# Patient Record
Sex: FEMALE | Race: WHITE | Employment: OTHER | URBAN - METROPOLITAN AREA
[De-identification: names, ages, dates, MRNs, and addresses within clinical notes are randomized per-mention and may not be internally consistent; named-entity substitution may affect disease eponyms.]

---

## 2017-02-15 LAB
AVERAGE GLUCOSE: NORMAL
HBA1C MFR BLD: 9.1 %

## 2017-05-17 ENCOUNTER — OFFICE VISIT (OUTPATIENT)
Dept: FAMILY MEDICINE CLINIC | Age: 62
End: 2017-05-17

## 2017-05-17 VITALS
HEART RATE: 95 BPM | HEIGHT: 68 IN | SYSTOLIC BLOOD PRESSURE: 126 MMHG | OXYGEN SATURATION: 98 % | BODY MASS INDEX: 42.83 KG/M2 | DIASTOLIC BLOOD PRESSURE: 84 MMHG | WEIGHT: 282.6 LBS

## 2017-05-17 DIAGNOSIS — E66.01 MORBID OBESITY DUE TO EXCESS CALORIES (HCC): ICD-10-CM

## 2017-05-17 DIAGNOSIS — F41.9 ANXIETY: ICD-10-CM

## 2017-05-17 DIAGNOSIS — G47.33 OBSTRUCTIVE SLEEP APNEA: Chronic | ICD-10-CM

## 2017-05-17 DIAGNOSIS — I10 ESSENTIAL HYPERTENSION: ICD-10-CM

## 2017-05-17 DIAGNOSIS — F90.0 ATTENTION DEFICIT HYPERACTIVITY DISORDER (ADHD), PREDOMINANTLY INATTENTIVE TYPE: ICD-10-CM

## 2017-05-17 DIAGNOSIS — E11.9 TYPE 2 DIABETES MELLITUS WITHOUT COMPLICATION, WITHOUT LONG-TERM CURRENT USE OF INSULIN (HCC): Primary | ICD-10-CM

## 2017-05-17 DIAGNOSIS — R53.83 FATIGUE, UNSPECIFIED TYPE: ICD-10-CM

## 2017-05-17 DIAGNOSIS — E06.3 HYPOTHYROIDISM DUE TO HASHIMOTO'S THYROIDITIS: ICD-10-CM

## 2017-05-17 DIAGNOSIS — E03.8 HYPOTHYROIDISM DUE TO HASHIMOTO'S THYROIDITIS: ICD-10-CM

## 2017-05-17 PROCEDURE — 3046F HEMOGLOBIN A1C LEVEL >9.0%: CPT | Performed by: FAMILY MEDICINE

## 2017-05-17 PROCEDURE — G8427 DOCREV CUR MEDS BY ELIG CLIN: HCPCS | Performed by: FAMILY MEDICINE

## 2017-05-17 PROCEDURE — 3017F COLORECTAL CA SCREEN DOC REV: CPT | Performed by: FAMILY MEDICINE

## 2017-05-17 PROCEDURE — 3014F SCREEN MAMMO DOC REV: CPT | Performed by: FAMILY MEDICINE

## 2017-05-17 PROCEDURE — 1036F TOBACCO NON-USER: CPT | Performed by: FAMILY MEDICINE

## 2017-05-17 PROCEDURE — G8417 CALC BMI ABV UP PARAM F/U: HCPCS | Performed by: FAMILY MEDICINE

## 2017-05-17 PROCEDURE — 99214 OFFICE O/P EST MOD 30 MIN: CPT | Performed by: FAMILY MEDICINE

## 2017-05-17 RX ORDER — NYSTATIN 100000 U/G
CREAM TOPICAL
Status: ON HOLD | COMMUNITY
Start: 2013-10-15 | End: 2020-07-29 | Stop reason: HOSPADM

## 2017-05-17 RX ORDER — SPIRONOLACTONE 25 MG/1
25 TABLET ORAL DAILY
Qty: 30 TABLET | Refills: 5 | Status: SHIPPED | OUTPATIENT
Start: 2017-05-17 | End: 2017-11-23 | Stop reason: SDUPTHER

## 2017-05-17 RX ORDER — INSULIN ASPART 100 [IU]/ML
INJECTION, SOLUTION INTRAVENOUS; SUBCUTANEOUS
COMMUNITY
Start: 2017-05-16 | End: 2017-06-17 | Stop reason: SDUPTHER

## 2017-05-17 RX ORDER — LEVOTHYROXINE SODIUM 300 MCG
TABLET ORAL
Refills: 3 | COMMUNITY
Start: 2017-05-11 | End: 2017-05-17

## 2017-05-17 RX ORDER — NYSTATIN 100000 U/G
CREAM TOPICAL
Qty: 30 G | Refills: 2 | Status: SHIPPED | OUTPATIENT
Start: 2017-05-17 | End: 2019-11-09

## 2017-05-17 RX ORDER — ZOLPIDEM TARTRATE 10 MG/1
10 TABLET ORAL NIGHTLY PRN
Qty: 30 TABLET | Refills: 0 | Status: SHIPPED | OUTPATIENT
Start: 2017-05-17 | End: 2017-06-19 | Stop reason: SDUPTHER

## 2017-05-17 RX ORDER — DEXTROAMPHETAMINE SACCHARATE, AMPHETAMINE ASPARTATE MONOHYDRATE, DEXTROAMPHETAMINE SULFATE AND AMPHETAMINE SULFATE 2.5; 2.5; 2.5; 2.5 MG/1; MG/1; MG/1; MG/1
10 CAPSULE, EXTENDED RELEASE ORAL EVERY MORNING
Qty: 30 CAPSULE | Refills: 0 | Status: SHIPPED | OUTPATIENT
Start: 2017-05-17 | End: 2019-06-05

## 2017-05-17 RX ORDER — ZOLPIDEM TARTRATE 10 MG/1
TABLET ORAL
COMMUNITY
Start: 2017-01-19 | End: 2019-06-05

## 2017-05-17 RX ORDER — MIRABEGRON 25 MG/1
TABLET, FILM COATED, EXTENDED RELEASE ORAL
COMMUNITY
Start: 2017-03-16 | End: 2019-11-09 | Stop reason: SDUPTHER

## 2017-05-17 RX ORDER — LISINOPRIL 2.5 MG/1
2.5 TABLET ORAL
COMMUNITY
Start: 2016-11-07 | End: 2019-06-12 | Stop reason: SDUPTHER

## 2017-05-17 RX ORDER — LEVOTHYROXINE SODIUM 300 UG/1
300 TABLET ORAL
COMMUNITY
Start: 2016-11-17 | End: 2017-06-28 | Stop reason: SDUPTHER

## 2017-05-17 RX ORDER — SERTRALINE HYDROCHLORIDE 100 MG/1
100 TABLET, FILM COATED ORAL DAILY
COMMUNITY
Start: 2017-05-01 | End: 2017-06-19 | Stop reason: SDUPTHER

## 2017-05-17 RX ORDER — FESOTERODINE FUMARATE 4 MG/1
TABLET, FILM COATED, EXTENDED RELEASE ORAL
COMMUNITY
Start: 2017-05-16 | End: 2019-11-09

## 2017-05-26 ASSESSMENT — ENCOUNTER SYMPTOMS: RESPIRATORY NEGATIVE: 1

## 2017-06-02 DIAGNOSIS — E11.9 TYPE 2 DIABETES MELLITUS WITHOUT COMPLICATION, WITHOUT LONG-TERM CURRENT USE OF INSULIN (HCC): ICD-10-CM

## 2017-06-02 DIAGNOSIS — E03.8 HYPOTHYROIDISM DUE TO HASHIMOTO'S THYROIDITIS: ICD-10-CM

## 2017-06-02 DIAGNOSIS — E06.3 HYPOTHYROIDISM DUE TO HASHIMOTO'S THYROIDITIS: ICD-10-CM

## 2017-06-02 DIAGNOSIS — I10 ESSENTIAL HYPERTENSION: ICD-10-CM

## 2017-06-02 LAB
A/G RATIO: 1.6 (ref 1.1–2.2)
ALBUMIN SERPL-MCNC: 4.3 G/DL (ref 3.4–5)
ALP BLD-CCNC: 49 U/L (ref 40–129)
ALT SERPL-CCNC: 20 U/L (ref 10–40)
ANION GAP SERPL CALCULATED.3IONS-SCNC: 13 MMOL/L (ref 3–16)
AST SERPL-CCNC: 14 U/L (ref 15–37)
BILIRUB SERPL-MCNC: 0.5 MG/DL (ref 0–1)
BUN BLDV-MCNC: 15 MG/DL (ref 7–20)
CALCIUM SERPL-MCNC: 10 MG/DL (ref 8.3–10.6)
CHLORIDE BLD-SCNC: 97 MMOL/L (ref 99–110)
CHOLESTEROL, TOTAL: 108 MG/DL (ref 0–199)
CO2: 26 MMOL/L (ref 21–32)
CREAT SERPL-MCNC: 0.5 MG/DL (ref 0.6–1.1)
CREATININE URINE: 70.6 MG/DL (ref 28–259)
GFR AFRICAN AMERICAN: >60
GFR NON-AFRICAN AMERICAN: >60
GLOBULIN: 2.7 G/DL
GLUCOSE BLD-MCNC: 290 MG/DL (ref 70–99)
HCT VFR BLD CALC: 41.6 % (ref 36–48)
HDLC SERPL-MCNC: 39 MG/DL (ref 40–60)
HEMOGLOBIN: 13.8 G/DL (ref 12–16)
LDL CHOLESTEROL CALCULATED: 39 MG/DL
MCH RBC QN AUTO: 30.5 PG (ref 26–34)
MCHC RBC AUTO-ENTMCNC: 33.2 G/DL (ref 31–36)
MCV RBC AUTO: 91.9 FL (ref 80–100)
MICROALBUMIN UR-MCNC: 2.5 MG/DL
MICROALBUMIN/CREAT UR-RTO: 35.4 MG/G (ref 0–30)
PDW BLD-RTO: 14.5 % (ref 12.4–15.4)
PLATELET # BLD: 169 K/UL (ref 135–450)
PMV BLD AUTO: 9 FL (ref 5–10.5)
POTASSIUM SERPL-SCNC: 5 MMOL/L (ref 3.5–5.1)
RBC # BLD: 4.52 M/UL (ref 4–5.2)
SODIUM BLD-SCNC: 136 MMOL/L (ref 136–145)
TOTAL PROTEIN: 7 G/DL (ref 6.4–8.2)
TRIGL SERPL-MCNC: 150 MG/DL (ref 0–150)
VLDLC SERPL CALC-MCNC: 30 MG/DL
WBC # BLD: 5.5 K/UL (ref 4–11)

## 2017-06-03 LAB
ESTIMATED AVERAGE GLUCOSE: 226 MG/DL
HBA1C MFR BLD: 9.5 %
T3 TOTAL: 1.13 NG/ML (ref 0.8–2)
T4 FREE: 2.3 NG/DL (ref 0.9–1.8)
TSH SERPL DL<=0.05 MIU/L-ACNC: 2.79 UIU/ML (ref 0.27–4.2)

## 2017-06-05 ENCOUNTER — TELEPHONE (OUTPATIENT)
Dept: FAMILY MEDICINE CLINIC | Age: 62
End: 2017-06-05

## 2017-06-15 ENCOUNTER — TELEPHONE (OUTPATIENT)
Dept: FAMILY MEDICINE CLINIC | Age: 62
End: 2017-06-15

## 2017-06-19 ENCOUNTER — TELEPHONE (OUTPATIENT)
Dept: FAMILY MEDICINE CLINIC | Age: 62
End: 2017-06-19

## 2017-06-19 DIAGNOSIS — F41.9 ANXIETY: ICD-10-CM

## 2017-06-19 DIAGNOSIS — G47.30 SLEEP APNEA, UNSPECIFIED TYPE: Primary | ICD-10-CM

## 2017-06-19 DIAGNOSIS — E11.9 TYPE 2 DIABETES MELLITUS WITHOUT COMPLICATION, WITHOUT LONG-TERM CURRENT USE OF INSULIN (HCC): ICD-10-CM

## 2017-06-19 RX ORDER — ZOLPIDEM TARTRATE 10 MG/1
10 TABLET ORAL NIGHTLY PRN
Qty: 30 TABLET | Refills: 0 | Status: SHIPPED | OUTPATIENT
Start: 2017-06-19 | End: 2017-07-10 | Stop reason: SDUPTHER

## 2017-06-19 RX ORDER — INSULIN ASPART 100 [IU]/ML
INJECTION, SOLUTION INTRAVENOUS; SUBCUTANEOUS
Qty: 15 ML | Refills: 0 | Status: SHIPPED | OUTPATIENT
Start: 2017-06-19 | End: 2017-08-16 | Stop reason: SDUPTHER

## 2017-06-19 RX ORDER — ZOLPIDEM TARTRATE 10 MG/1
TABLET ORAL
Qty: 45 TABLET | Refills: 0 | Status: CANCELLED | OUTPATIENT
Start: 2017-06-19

## 2017-06-20 DIAGNOSIS — E11.9 TYPE 2 DIABETES MELLITUS WITHOUT COMPLICATION, WITHOUT LONG-TERM CURRENT USE OF INSULIN (HCC): ICD-10-CM

## 2017-06-20 RX ORDER — BLOOD SUGAR DIAGNOSTIC
STRIP MISCELLANEOUS
Qty: 300 STRIP | Refills: 3 | Status: SHIPPED | OUTPATIENT
Start: 2017-06-20 | End: 2018-02-08 | Stop reason: SDUPTHER

## 2017-06-20 RX ORDER — SERTRALINE HYDROCHLORIDE 100 MG/1
100 TABLET, FILM COATED ORAL 2 TIMES DAILY
Qty: 180 TABLET | Refills: 0 | Status: SHIPPED | OUTPATIENT
Start: 2017-06-20 | End: 2019-07-31 | Stop reason: SDUPTHER

## 2017-06-26 RX ORDER — DEXTROAMPHETAMINE SACCHARATE, AMPHETAMINE ASPARTATE MONOHYDRATE, DEXTROAMPHETAMINE SULFATE AND AMPHETAMINE SULFATE 2.5; 2.5; 2.5; 2.5 MG/1; MG/1; MG/1; MG/1
10 CAPSULE, EXTENDED RELEASE ORAL EVERY MORNING
Qty: 30 CAPSULE | Refills: 0 | OUTPATIENT
Start: 2017-06-26

## 2017-06-29 ENCOUNTER — TELEPHONE (OUTPATIENT)
Dept: FAMILY MEDICINE CLINIC | Age: 62
End: 2017-06-29

## 2017-06-29 RX ORDER — LEVOTHYROXINE SODIUM 300 UG/1
300 TABLET ORAL DAILY
Qty: 90 TABLET | Refills: 0 | Status: SHIPPED | OUTPATIENT
Start: 2017-06-29 | End: 2019-09-25 | Stop reason: ALTCHOICE

## 2017-07-06 ENCOUNTER — TELEPHONE (OUTPATIENT)
Dept: FAMILY MEDICINE CLINIC | Age: 62
End: 2017-07-06

## 2017-07-06 RX ORDER — PEN NEEDLE, DIABETIC 32GX 5/32"
NEEDLE, DISPOSABLE MISCELLANEOUS
Qty: 200 EACH | Refills: 0 | Status: SHIPPED | OUTPATIENT
Start: 2017-07-06 | End: 2019-09-25

## 2017-07-11 RX ORDER — ZOLPIDEM TARTRATE 10 MG/1
10 TABLET ORAL NIGHTLY PRN
Qty: 30 TABLET | Refills: 0 | Status: SHIPPED | OUTPATIENT
Start: 2017-07-11 | End: 2019-09-03 | Stop reason: SDUPTHER

## 2017-07-14 ENCOUNTER — TELEPHONE (OUTPATIENT)
Dept: FAMILY MEDICINE CLINIC | Age: 62
End: 2017-07-14

## 2017-08-18 RX ORDER — INSULIN ASPART 100 [IU]/ML
INJECTION, SOLUTION INTRAVENOUS; SUBCUTANEOUS
Qty: 15 ML | Refills: 0 | Status: SHIPPED | OUTPATIENT
Start: 2017-08-18 | End: 2019-06-05

## 2017-09-05 RX ORDER — PEN NEEDLE, DIABETIC 32GX 5/32"
NEEDLE, DISPOSABLE MISCELLANEOUS
Refills: 0 | OUTPATIENT
Start: 2017-09-05

## 2017-11-27 RX ORDER — SPIRONOLACTONE 25 MG/1
25 TABLET ORAL DAILY
Qty: 90 TABLET | Refills: 5 | Status: SHIPPED | OUTPATIENT
Start: 2017-11-27 | End: 2019-06-05

## 2018-02-08 DIAGNOSIS — E11.9 TYPE 2 DIABETES MELLITUS WITHOUT COMPLICATION, WITHOUT LONG-TERM CURRENT USE OF INSULIN (HCC): ICD-10-CM

## 2018-03-20 ENCOUNTER — TELEPHONE (OUTPATIENT)
Dept: FAMILY MEDICINE CLINIC | Age: 63
End: 2018-03-20

## 2018-10-03 ENCOUNTER — TELEPHONE (OUTPATIENT)
Dept: FAMILY MEDICINE CLINIC | Age: 63
End: 2018-10-03

## 2018-10-04 NOTE — TELEPHONE ENCOUNTER
Pt established 5/17/17  No showed 6/6,6/12,6/15,6/26,7/19 of 2017  Cancelled 7/17 and 7/18 of 2017  There were no ED events ior admittance records for those dates  Pt has new patient appointment 10/11 with another provider  She can not be seen here.

## 2019-06-05 ENCOUNTER — OFFICE VISIT (OUTPATIENT)
Dept: FAMILY MEDICINE CLINIC | Age: 64
End: 2019-06-05
Payer: MEDICARE

## 2019-06-05 VITALS
DIASTOLIC BLOOD PRESSURE: 80 MMHG | BODY MASS INDEX: 39.19 KG/M2 | WEIGHT: 254 LBS | HEART RATE: 106 BPM | SYSTOLIC BLOOD PRESSURE: 142 MMHG

## 2019-06-05 DIAGNOSIS — E11.9 TYPE 2 DIABETES MELLITUS WITHOUT COMPLICATION, WITHOUT LONG-TERM CURRENT USE OF INSULIN (HCC): Primary | ICD-10-CM

## 2019-06-05 DIAGNOSIS — F41.0 PANIC DISORDER WITHOUT AGORAPHOBIA: ICD-10-CM

## 2019-06-05 LAB — HBA1C MFR BLD: 9.2 %

## 2019-06-05 PROCEDURE — 3017F COLORECTAL CA SCREEN DOC REV: CPT | Performed by: FAMILY MEDICINE

## 2019-06-05 PROCEDURE — 3046F HEMOGLOBIN A1C LEVEL >9.0%: CPT | Performed by: FAMILY MEDICINE

## 2019-06-05 PROCEDURE — G8427 DOCREV CUR MEDS BY ELIG CLIN: HCPCS | Performed by: FAMILY MEDICINE

## 2019-06-05 PROCEDURE — G8417 CALC BMI ABV UP PARAM F/U: HCPCS | Performed by: FAMILY MEDICINE

## 2019-06-05 PROCEDURE — 2022F DILAT RTA XM EVC RTNOPTHY: CPT | Performed by: FAMILY MEDICINE

## 2019-06-05 PROCEDURE — 99213 OFFICE O/P EST LOW 20 MIN: CPT | Performed by: FAMILY MEDICINE

## 2019-06-05 PROCEDURE — 83036 HEMOGLOBIN GLYCOSYLATED A1C: CPT | Performed by: FAMILY MEDICINE

## 2019-06-05 PROCEDURE — 4004F PT TOBACCO SCREEN RCVD TLK: CPT | Performed by: FAMILY MEDICINE

## 2019-06-05 RX ORDER — PANTOPRAZOLE SODIUM 20 MG/1
20 TABLET, DELAYED RELEASE ORAL
Qty: 30 TABLET | Refills: 5 | COMMUNITY
Start: 2019-06-05 | End: 2019-08-02

## 2019-06-05 RX ORDER — SPIRONOLACTONE 25 MG/1
12.5 TABLET ORAL DAILY
Qty: 90 TABLET | Refills: 5 | COMMUNITY
Start: 2019-06-05 | End: 2019-06-12 | Stop reason: SDUPTHER

## 2019-06-05 NOTE — PROGRESS NOTES
Subjective:      Patient ID: Shaun Burkitt is a 66 y.o. female. 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 She was started on spironolactone years ago for Ménière's disease, which she does not know if this helped. Has been on 10 mg of Ambien for sleep for very long time  Review of Systems   Genitourinary:        On 2.5 mg lisinopril for renal protection   Psychiatric/Behavioral: The patient is nervous/anxious (panic attacks with little provocation). PMSHx reviewed and/or updated    Objective:   Physical Exam   Cardiovascular: Normal rate, regular rhythm and normal heart sounds. No murmur heard. Pulmonary/Chest: Effort normal and breath sounds normal.   Musculoskeletal: She exhibits no edema.    Psychiatric:   Her voice is strained she is very uncomfortable in the office although she did calm herself slightly by the end of the visit     Vitals:    06/05/19 1517   BP: (!) 142/80   Pulse: 106   Weight: 254 lb (115.2 kg)       Assessment:    elevated blood pressure  Panic disorder without agoraphobia  Type 2 diabetes with nonproliferative retinopathy, uncontrolled      Plan:    decrease spironolactone to 12.5 mg daily  Return in 3 weeks  Try Cece Arana MD

## 2019-06-12 RX ORDER — LEVOTHYROXINE SODIUM 175 UG/1
175 TABLET ORAL DAILY
Qty: 90 TABLET | Refills: 1 | Status: SHIPPED | OUTPATIENT
Start: 2019-06-12 | End: 2019-10-02 | Stop reason: SDUPTHER

## 2019-06-12 RX ORDER — LEVOTHYROXINE SODIUM 175 UG/1
175 TABLET ORAL DAILY
COMMUNITY
End: 2019-06-12 | Stop reason: SDUPTHER

## 2019-06-12 RX ORDER — LISINOPRIL 2.5 MG/1
2.5 TABLET ORAL DAILY
Qty: 90 TABLET | Refills: 1 | Status: SHIPPED | OUTPATIENT
Start: 2019-06-12 | End: 2019-11-06 | Stop reason: SDUPTHER

## 2019-06-12 RX ORDER — SPIRONOLACTONE 25 MG/1
12.5 TABLET ORAL DAILY
Qty: 90 TABLET | Refills: 1 | Status: SHIPPED | OUTPATIENT
Start: 2019-06-12 | End: 2019-08-16 | Stop reason: SDUPTHER

## 2019-06-12 NOTE — TELEPHONE ENCOUNTER
Medication:   Requested Prescriptions     Pending Prescriptions Disp Refills    spironolactone (ALDACTONE) 25 MG tablet 90 tablet 0     Sig: Take 0.5 tablets by mouth daily    lisinopril (PRINIVIL;ZESTRIL) 2.5 MG tablet 90 tablet 0     Sig: Take 1 tablet by mouth daily    metFORMIN (GLUCOPHAGE) 500 MG tablet 360 tablet 0     Sig: Take 1 tablet by mouth 4 times daily (with meals and nightly)    levothyroxine (SYNTHROID) 175 MCG tablet 90 tablet 0     Sig: Take 1 tablet by mouth Daily    insulin aspart (NOVOLOG FLEXPEN) 100 UNIT/ML injection pen 15 mL 0     Sig: Inject 5-7 Units into the skin 3 times daily (before meals)       Last appt: 6/5/2019   Next appt: 6/26/2019    Last Labs DM:   Lab Results   Component Value Date    LABA1C 9.2 06/05/2019     Last Lipid:   Lab Results   Component Value Date    CHOL 108 06/02/2017    TRIG 150 06/02/2017    HDL 39 06/02/2017    LDLCALC 39 06/02/2017     Last PSA: No results found for: PSA  Last Thyroid:   Lab Results   Component Value Date    TSH 2.79 06/02/2017    P9VOZEE 1.13 06/02/2017    T4FREE 2.3 06/02/2017

## 2019-06-14 ENCOUNTER — TELEPHONE (OUTPATIENT)
Dept: FAMILY MEDICINE CLINIC | Age: 64
End: 2019-06-14

## 2019-06-14 RX ORDER — HYDROXYZINE 50 MG/1
50 TABLET, FILM COATED ORAL EVERY 8 HOURS PRN
Qty: 90 TABLET | Refills: 0 | Status: SHIPPED | OUTPATIENT
Start: 2019-06-14 | End: 2019-10-11 | Stop reason: SDUPTHER

## 2019-06-14 NOTE — TELEPHONE ENCOUNTER
Pt calling stating that she has been having an increase of anxiety and panic attack, she states that she is currently on zoloft 100mg BID and is wondering if maybe that could be increased. She states that she has also in the past taken clonazepam and doxepin and wondering if either one of them will help her again. Please advise.

## 2019-06-14 NOTE — TELEPHONE ENCOUNTER
May try to avoid drugs in the clonazepam class if at all possible. I would like to try her on some Hydroxyzine, which is a mild sedative. It can be used 2-3 times a day if needed although it may cause some sedation as well.   Give this a try for a few days and give me a call back with the report

## 2019-06-14 NOTE — TELEPHONE ENCOUNTER
Please call the pt. She would not divulge what the call was in regards to.  She would like to speak to either Dr. Jaja Patel or his MAKAYLA Velez 6/5/19

## 2019-06-26 ENCOUNTER — TELEPHONE (OUTPATIENT)
Dept: FAMILY MEDICINE CLINIC | Age: 64
End: 2019-06-26

## 2019-07-19 ENCOUNTER — OFFICE VISIT (OUTPATIENT)
Dept: FAMILY MEDICINE CLINIC | Age: 64
End: 2019-07-19
Payer: MEDICARE

## 2019-07-19 VITALS
BODY MASS INDEX: 39.5 KG/M2 | HEART RATE: 94 BPM | SYSTOLIC BLOOD PRESSURE: 124 MMHG | DIASTOLIC BLOOD PRESSURE: 78 MMHG | WEIGHT: 256 LBS | TEMPERATURE: 97 F | OXYGEN SATURATION: 99 %

## 2019-07-19 DIAGNOSIS — Z01.818 PRE-OP EVALUATION: Primary | ICD-10-CM

## 2019-07-19 DIAGNOSIS — H40.9 ACUTE GLAUCOMA OF LEFT EYE: ICD-10-CM

## 2019-07-19 PROCEDURE — G8417 CALC BMI ABV UP PARAM F/U: HCPCS | Performed by: FAMILY MEDICINE

## 2019-07-19 PROCEDURE — 99212 OFFICE O/P EST SF 10 MIN: CPT | Performed by: FAMILY MEDICINE

## 2019-07-19 PROCEDURE — G8428 CUR MEDS NOT DOCUMENT: HCPCS | Performed by: FAMILY MEDICINE

## 2019-07-19 ASSESSMENT — ENCOUNTER SYMPTOMS: DIARRHEA: 1

## 2019-07-31 DIAGNOSIS — F41.9 ANXIETY: ICD-10-CM

## 2019-07-31 RX ORDER — SERTRALINE HYDROCHLORIDE 100 MG/1
TABLET, FILM COATED ORAL
Qty: 180 TABLET | Refills: 0 | Status: SHIPPED | OUTPATIENT
Start: 2019-07-31 | End: 2019-11-06 | Stop reason: SDUPTHER

## 2019-07-31 NOTE — TELEPHONE ENCOUNTER
Medication:   Requested Prescriptions     Pending Prescriptions Disp Refills    sertraline (ZOLOFT) 100 MG tablet [Pharmacy Med Name: SERTRALINE 100MG TABLETS] 180 tablet 0     Sig: TAKE 2 TABLETS BY MOUTH DAILY, TAKE 2ND DOSE AROUND 5PM     Last Filled:  5.7.19    Last appt: 7/19/2019   Next appt: 8.15.19    Last OARRS: No flowsheet data found.

## 2019-08-01 DIAGNOSIS — E11.9 TYPE 2 DIABETES MELLITUS WITHOUT COMPLICATION, WITHOUT LONG-TERM CURRENT USE OF INSULIN (HCC): ICD-10-CM

## 2019-08-02 RX ORDER — PANTOPRAZOLE SODIUM 40 MG/1
TABLET, DELAYED RELEASE ORAL
Qty: 180 TABLET | Refills: 0 | Status: SHIPPED | OUTPATIENT
Start: 2019-08-02 | End: 2020-01-31 | Stop reason: SDUPTHER

## 2019-08-02 NOTE — TELEPHONE ENCOUNTER
Medication:   Requested Prescriptions     Pending Prescriptions Disp Refills    pantoprazole (PROTONIX) 40 MG tablet [Pharmacy Med Name: PANTOPRAZOLE 40MG TABLETS] 180 tablet 0     Sig: TAKE 1 TABLET BY MOUTH TWICE DAILY       Last appt: 7/19/2019   Next appt: 8.15.19    Last OARRS: No flowsheet data found.

## 2019-08-15 RX ORDER — INSULIN DETEMIR 100 [IU]/ML
INJECTION, SOLUTION SUBCUTANEOUS
Qty: 27 ML | Refills: 0 | Status: SHIPPED | OUTPATIENT
Start: 2019-08-15 | End: 2019-11-09 | Stop reason: SDUPTHER

## 2019-08-16 ENCOUNTER — TELEPHONE (OUTPATIENT)
Dept: PRIMARY CARE CLINIC | Age: 64
End: 2019-08-16

## 2019-08-16 ENCOUNTER — OFFICE VISIT (OUTPATIENT)
Dept: PRIMARY CARE CLINIC | Age: 64
End: 2019-08-16
Payer: MEDICARE

## 2019-08-16 DIAGNOSIS — D68.59 THROMBOPHILIA (HCC): Primary | ICD-10-CM

## 2019-08-16 DIAGNOSIS — F41.1 GAD (GENERALIZED ANXIETY DISORDER): ICD-10-CM

## 2019-08-16 PROCEDURE — 3017F COLORECTAL CA SCREEN DOC REV: CPT | Performed by: FAMILY MEDICINE

## 2019-08-16 PROCEDURE — G8417 CALC BMI ABV UP PARAM F/U: HCPCS | Performed by: FAMILY MEDICINE

## 2019-08-16 PROCEDURE — 99213 OFFICE O/P EST LOW 20 MIN: CPT | Performed by: FAMILY MEDICINE

## 2019-08-16 PROCEDURE — 4004F PT TOBACCO SCREEN RCVD TLK: CPT | Performed by: FAMILY MEDICINE

## 2019-08-16 PROCEDURE — G8428 CUR MEDS NOT DOCUMENT: HCPCS | Performed by: FAMILY MEDICINE

## 2019-08-16 RX ORDER — SPIRONOLACTONE 25 MG/1
12.5 TABLET ORAL DAILY
Qty: 90 TABLET | Refills: 1 | Status: SHIPPED | OUTPATIENT
Start: 2019-08-16 | End: 2020-06-01

## 2019-08-16 NOTE — TELEPHONE ENCOUNTER
[facility] called to inform you that a new Rx is need for the medication below. The direction received was 0.50 mg daily. Then the Rx changed back to 1 tablet daily. The pharmacy need a new script with new instructions for the patient to be covered by the patient's insurance. Medication:   Requested Prescriptions     Pending Prescriptions Disp Refills    spironolactone (ALDACTONE) 25 MG tablet 90 tablet 1     Sig: Take 0.5 tablets by mouth daily        Last Filled:      Patient Phone Number: [phone] (home)     Last appt: 8/15/2019   Next appt: 8/16/2019    Last OARRS: No flowsheet data found.     Preferred Pharmacy:   [address] [phone] [facility] [phone]  [address] [place] [id number]  Phone: [phone] Fax: [phone]

## 2019-08-19 DIAGNOSIS — E78.2 MIXED HYPERLIPIDEMIA: ICD-10-CM

## 2019-08-19 DIAGNOSIS — E11.9 TYPE 2 DIABETES MELLITUS WITHOUT COMPLICATION, WITHOUT LONG-TERM CURRENT USE OF INSULIN (HCC): Primary | ICD-10-CM

## 2019-08-22 ENCOUNTER — TELEPHONE (OUTPATIENT)
Dept: PRIMARY CARE CLINIC | Age: 64
End: 2019-08-22

## 2019-08-26 ENCOUNTER — TELEPHONE (OUTPATIENT)
Dept: PRIMARY CARE CLINIC | Age: 64
End: 2019-08-26

## 2019-09-03 DIAGNOSIS — F51.01 PRIMARY INSOMNIA: Primary | ICD-10-CM

## 2019-09-03 RX ORDER — ZOLPIDEM TARTRATE 10 MG/1
10 TABLET ORAL NIGHTLY PRN
Qty: 30 TABLET | Refills: 0 | Status: SHIPPED | OUTPATIENT
Start: 2019-09-03 | End: 2019-09-29 | Stop reason: SDUPTHER

## 2019-09-03 NOTE — TELEPHONE ENCOUNTER
Medication:   Requested Prescriptions     Pending Prescriptions Disp Refills    zolpidem (AMBIEN) 10 MG tablet 30 tablet 0     Sig: Take 1 tablet by mouth nightly as needed for Sleep. Last appt: 8/16/2019   Next appt: Visit date not found    Last OARRS: No flowsheet data found.

## 2019-09-04 DIAGNOSIS — E78.2 MIXED HYPERLIPIDEMIA: ICD-10-CM

## 2019-09-04 DIAGNOSIS — D68.59 THROMBOPHILIA (HCC): ICD-10-CM

## 2019-09-04 DIAGNOSIS — E11.9 TYPE 2 DIABETES MELLITUS WITHOUT COMPLICATION, WITHOUT LONG-TERM CURRENT USE OF INSULIN (HCC): ICD-10-CM

## 2019-09-04 LAB
ANION GAP SERPL CALCULATED.3IONS-SCNC: 11 MMOL/L (ref 3–16)
APTT: 28.9 SEC (ref 26–36)
BASOPHILS ABSOLUTE: 0.1 K/UL (ref 0–0.2)
BASOPHILS RELATIVE PERCENT: 1.2 %
BUN BLDV-MCNC: 16 MG/DL (ref 7–20)
CALCIUM SERPL-MCNC: 10.1 MG/DL (ref 8.3–10.6)
CHLORIDE BLD-SCNC: 98 MMOL/L (ref 99–110)
CHOLESTEROL, TOTAL: 131 MG/DL (ref 0–199)
CO2: 27 MMOL/L (ref 21–32)
CREAT SERPL-MCNC: 0.7 MG/DL (ref 0.6–1.2)
EOSINOPHILS ABSOLUTE: 0.2 K/UL (ref 0–0.6)
EOSINOPHILS RELATIVE PERCENT: 3.8 %
GFR AFRICAN AMERICAN: >60
GFR NON-AFRICAN AMERICAN: >60
GLUCOSE BLD-MCNC: 414 MG/DL (ref 70–99)
HCT VFR BLD CALC: 39.6 % (ref 36–48)
HDLC SERPL-MCNC: 45 MG/DL (ref 40–60)
HEMOGLOBIN: 14 G/DL (ref 12–16)
INR BLD: 1.01 (ref 0.86–1.14)
LDL CHOLESTEROL CALCULATED: 54 MG/DL
LYMPHOCYTES ABSOLUTE: 1.8 K/UL (ref 1–5.1)
LYMPHOCYTES RELATIVE PERCENT: 32.5 %
MCH RBC QN AUTO: 31.1 PG (ref 26–34)
MCHC RBC AUTO-ENTMCNC: 35.4 G/DL (ref 31–36)
MCV RBC AUTO: 87.9 FL (ref 80–100)
MONOCYTES ABSOLUTE: 0.3 K/UL (ref 0–1.3)
MONOCYTES RELATIVE PERCENT: 5.5 %
NEUTROPHILS ABSOLUTE: 3.1 K/UL (ref 1.7–7.7)
NEUTROPHILS RELATIVE PERCENT: 57 %
PDW BLD-RTO: 13.4 % (ref 12.4–15.4)
PLATELET # BLD: 235 K/UL (ref 135–450)
PMV BLD AUTO: 9.1 FL (ref 5–10.5)
POTASSIUM SERPL-SCNC: 5.2 MMOL/L (ref 3.5–5.1)
PROTHROMBIN TIME: 11.5 SEC (ref 9.8–13)
RBC # BLD: 4.5 M/UL (ref 4–5.2)
SODIUM BLD-SCNC: 136 MMOL/L (ref 136–145)
TRIGL SERPL-MCNC: 161 MG/DL (ref 0–150)
VLDLC SERPL CALC-MCNC: 32 MG/DL
WBC # BLD: 5.4 K/UL (ref 4–11)

## 2019-09-05 ENCOUNTER — PATIENT MESSAGE (OUTPATIENT)
Dept: PRIMARY CARE CLINIC | Age: 64
End: 2019-09-05

## 2019-09-05 LAB
ESTIMATED AVERAGE GLUCOSE: 211.6 MG/DL
HBA1C MFR BLD: 9 %

## 2019-09-06 LAB
PROTEIN C FUNCTIONAL: 148 % (ref 83–168)
PROTEIN S, FUNCTIONAL: 91 % (ref 57–131)

## 2019-09-06 RX ORDER — LEVOCETIRIZINE DIHYDROCHLORIDE 5 MG/1
5 TABLET, FILM COATED ORAL NIGHTLY
Qty: 30 TABLET | Refills: 5 | Status: SHIPPED | OUTPATIENT
Start: 2019-09-06 | End: 2019-12-24

## 2019-09-07 LAB
FACTOR V LEIDEN: NEGATIVE
SPECIMEN: NORMAL

## 2019-09-09 LAB
MTHFR BY PCR SPECIMEN: ABNORMAL
MTHFR INTERPRETATION: ABNORMAL
MTHFR MUTATION A1298C: ABNORMAL
MTHFR MUTATION C677T: NEGATIVE

## 2019-09-11 DIAGNOSIS — E11.9 TYPE 2 DIABETES MELLITUS WITHOUT COMPLICATION, WITHOUT LONG-TERM CURRENT USE OF INSULIN (HCC): ICD-10-CM

## 2019-09-25 ENCOUNTER — OFFICE VISIT (OUTPATIENT)
Dept: PRIMARY CARE CLINIC | Age: 64
End: 2019-09-25
Payer: MEDICARE

## 2019-09-25 VITALS
BODY MASS INDEX: 40.43 KG/M2 | WEIGHT: 262 LBS | HEART RATE: 82 BPM | SYSTOLIC BLOOD PRESSURE: 116 MMHG | DIASTOLIC BLOOD PRESSURE: 78 MMHG

## 2019-09-25 DIAGNOSIS — J32.9 RECURRENT SINUSITIS: Primary | ICD-10-CM

## 2019-09-25 DIAGNOSIS — F41.0 PANIC DISORDER: ICD-10-CM

## 2019-09-25 PROCEDURE — G8427 DOCREV CUR MEDS BY ELIG CLIN: HCPCS | Performed by: FAMILY MEDICINE

## 2019-09-25 PROCEDURE — 4004F PT TOBACCO SCREEN RCVD TLK: CPT | Performed by: FAMILY MEDICINE

## 2019-09-25 PROCEDURE — G8417 CALC BMI ABV UP PARAM F/U: HCPCS | Performed by: FAMILY MEDICINE

## 2019-09-25 PROCEDURE — 99213 OFFICE O/P EST LOW 20 MIN: CPT | Performed by: FAMILY MEDICINE

## 2019-09-25 PROCEDURE — 3017F COLORECTAL CA SCREEN DOC REV: CPT | Performed by: FAMILY MEDICINE

## 2019-09-25 RX ORDER — AMOXICILLIN AND CLAVULANATE POTASSIUM 875; 125 MG/1; MG/1
1 TABLET, FILM COATED ORAL 2 TIMES DAILY
Qty: 20 TABLET | Refills: 0 | Status: SHIPPED | OUTPATIENT
Start: 2019-09-25 | End: 2019-10-05

## 2019-09-25 RX ORDER — IPRATROPIUM BROMIDE 42 UG/1
2 SPRAY, METERED NASAL 3 TIMES DAILY
Qty: 1 BOTTLE | Refills: 3 | Status: SHIPPED | OUTPATIENT
Start: 2019-09-25 | End: 2019-11-09

## 2019-09-29 DIAGNOSIS — F51.01 PRIMARY INSOMNIA: ICD-10-CM

## 2019-09-30 RX ORDER — ZOLPIDEM TARTRATE 10 MG/1
TABLET ORAL
Qty: 30 TABLET | Refills: 0 | Status: SHIPPED | OUTPATIENT
Start: 2019-09-30 | End: 2019-10-01 | Stop reason: SDUPTHER

## 2019-10-01 ENCOUNTER — TELEPHONE (OUTPATIENT)
Dept: FAMILY MEDICINE CLINIC | Age: 64
End: 2019-10-01

## 2019-10-01 DIAGNOSIS — F51.01 PRIMARY INSOMNIA: ICD-10-CM

## 2019-10-01 RX ORDER — ZOLPIDEM TARTRATE 10 MG/1
TABLET ORAL
Qty: 30 TABLET | Refills: 0 | Status: SHIPPED | OUTPATIENT
Start: 2019-10-01 | End: 2019-10-25 | Stop reason: SDUPTHER

## 2019-10-02 DIAGNOSIS — E11.9 TYPE 2 DIABETES MELLITUS WITHOUT COMPLICATION, WITHOUT LONG-TERM CURRENT USE OF INSULIN (HCC): Primary | ICD-10-CM

## 2019-10-02 RX ORDER — INSULIN ASPART 100 [IU]/ML
INJECTION, SOLUTION INTRAVENOUS; SUBCUTANEOUS
Qty: 15 ML | Refills: 0 | Status: CANCELLED | OUTPATIENT
Start: 2019-10-02

## 2019-10-02 RX ORDER — INSULIN ASPART 100 [IU]/ML
INJECTION, SOLUTION INTRAVENOUS; SUBCUTANEOUS
Qty: 15 ML | Refills: 0 | Status: SHIPPED | OUTPATIENT
Start: 2019-10-02 | End: 2019-10-02

## 2019-10-11 RX ORDER — HYDROXYZINE 50 MG/1
50 TABLET, FILM COATED ORAL EVERY 8 HOURS PRN
Qty: 90 TABLET | Refills: 2 | Status: SHIPPED | OUTPATIENT
Start: 2019-10-11 | End: 2019-10-25

## 2019-10-15 ENCOUNTER — TELEPHONE (OUTPATIENT)
Dept: PRIMARY CARE CLINIC | Age: 64
End: 2019-10-15

## 2019-10-23 ENCOUNTER — OFFICE VISIT (OUTPATIENT)
Dept: PRIMARY CARE CLINIC | Age: 64
End: 2019-10-23
Payer: MEDICARE

## 2019-10-23 VITALS
DIASTOLIC BLOOD PRESSURE: 78 MMHG | SYSTOLIC BLOOD PRESSURE: 140 MMHG | HEART RATE: 102 BPM | WEIGHT: 259 LBS | BODY MASS INDEX: 39.97 KG/M2

## 2019-10-23 DIAGNOSIS — R26.9 GAIT DISTURBANCE: ICD-10-CM

## 2019-10-23 DIAGNOSIS — F40.01 PANIC DISORDER WITH AGORAPHOBIA: ICD-10-CM

## 2019-10-23 DIAGNOSIS — G93.89 CEREBRAL VENTRICULOMEGALY: ICD-10-CM

## 2019-10-23 DIAGNOSIS — G56.03 BILATERAL CARPAL TUNNEL SYNDROME: Primary | ICD-10-CM

## 2019-10-23 PROCEDURE — G8427 DOCREV CUR MEDS BY ELIG CLIN: HCPCS | Performed by: FAMILY MEDICINE

## 2019-10-23 PROCEDURE — 3017F COLORECTAL CA SCREEN DOC REV: CPT | Performed by: FAMILY MEDICINE

## 2019-10-23 PROCEDURE — G8417 CALC BMI ABV UP PARAM F/U: HCPCS | Performed by: FAMILY MEDICINE

## 2019-10-23 PROCEDURE — 99213 OFFICE O/P EST LOW 20 MIN: CPT | Performed by: FAMILY MEDICINE

## 2019-10-23 PROCEDURE — 4004F PT TOBACCO SCREEN RCVD TLK: CPT | Performed by: FAMILY MEDICINE

## 2019-10-23 PROCEDURE — G8484 FLU IMMUNIZE NO ADMIN: HCPCS | Performed by: FAMILY MEDICINE

## 2019-10-24 ENCOUNTER — HOSPITAL ENCOUNTER (OUTPATIENT)
Dept: MRI IMAGING | Age: 64
Discharge: HOME | End: 2019-10-24
Payer: MEDICARE

## 2019-10-24 DIAGNOSIS — R26.9 GAIT DISTURBANCE: ICD-10-CM

## 2019-10-24 DIAGNOSIS — G93.89 CEREBRAL VENTRICULOMEGALY: ICD-10-CM

## 2019-10-24 PROCEDURE — 70553 MRI BRAIN STEM W/O & W/DYE: CPT

## 2019-10-24 PROCEDURE — A9579 GAD-BASE MR CONTRAST NOS,1ML: HCPCS | Performed by: FAMILY MEDICINE

## 2019-10-24 PROCEDURE — 6360000004 HC RX CONTRAST MEDICATION: Performed by: FAMILY MEDICINE

## 2019-10-24 RX ADMIN — GADOTERIDOL 20 ML: 279.3 INJECTION, SOLUTION INTRAVENOUS at 16:53

## 2019-10-25 DIAGNOSIS — F41.9 ANXIETY: Primary | ICD-10-CM

## 2019-10-25 DIAGNOSIS — F51.01 PRIMARY INSOMNIA: ICD-10-CM

## 2019-10-25 RX ORDER — CYCLOBENZAPRINE HCL 5 MG
5 TABLET ORAL 3 TIMES DAILY PRN
Qty: 90 TABLET | Refills: 1 | Status: SHIPPED | OUTPATIENT
Start: 2019-10-25 | End: 2020-04-06

## 2019-10-25 RX ORDER — DIAZEPAM 5 MG/1
5 TABLET ORAL PRN
Qty: 10 TABLET | Refills: 0 | Status: SHIPPED | OUTPATIENT
Start: 2019-10-25 | End: 2019-11-16 | Stop reason: SDUPTHER

## 2019-10-25 RX ORDER — ZOLPIDEM TARTRATE 10 MG/1
TABLET ORAL
Qty: 30 TABLET | Refills: 1 | Status: SHIPPED | OUTPATIENT
Start: 2019-10-31 | End: 2019-12-30

## 2019-10-29 LAB
T4 TOTAL: 9.6
TSH SERPL DL<=0.05 MIU/L-ACNC: 2.67 UIU/ML
VITAMIN B-12: 468

## 2019-11-06 DIAGNOSIS — F41.9 ANXIETY: ICD-10-CM

## 2019-11-06 DIAGNOSIS — E11.9 TYPE 2 DIABETES MELLITUS WITHOUT COMPLICATION, WITHOUT LONG-TERM CURRENT USE OF INSULIN (HCC): ICD-10-CM

## 2019-11-06 RX ORDER — SERTRALINE HYDROCHLORIDE 100 MG/1
TABLET, FILM COATED ORAL
Qty: 180 TABLET | Refills: 1 | Status: SHIPPED | OUTPATIENT
Start: 2019-11-06 | End: 2020-05-11

## 2019-11-06 RX ORDER — LEVOTHYROXINE SODIUM 175 MCG
175 TABLET ORAL DAILY
Qty: 30 TABLET | Refills: 11 | Status: SHIPPED | OUTPATIENT
Start: 2019-11-06 | End: 2020-07-01 | Stop reason: SDUPTHER

## 2019-11-06 RX ORDER — LISINOPRIL 2.5 MG/1
2.5 TABLET ORAL DAILY
Qty: 90 TABLET | Refills: 1 | Status: SHIPPED | OUTPATIENT
Start: 2019-11-06 | End: 2020-02-15 | Stop reason: SDUPTHER

## 2019-11-09 ENCOUNTER — OFFICE VISIT (OUTPATIENT)
Dept: PRIMARY CARE CLINIC | Age: 64
End: 2019-11-09
Payer: MEDICARE

## 2019-11-09 VITALS
BODY MASS INDEX: 39.6 KG/M2 | OXYGEN SATURATION: 98 % | HEART RATE: 105 BPM | DIASTOLIC BLOOD PRESSURE: 74 MMHG | WEIGHT: 256.6 LBS | SYSTOLIC BLOOD PRESSURE: 116 MMHG | RESPIRATION RATE: 12 BRPM

## 2019-11-09 DIAGNOSIS — E11.9 TYPE 2 DIABETES MELLITUS WITHOUT COMPLICATION, WITH LONG-TERM CURRENT USE OF INSULIN (HCC): Primary | ICD-10-CM

## 2019-11-09 DIAGNOSIS — Z79.4 TYPE 2 DIABETES MELLITUS WITHOUT COMPLICATION, WITH LONG-TERM CURRENT USE OF INSULIN (HCC): Primary | ICD-10-CM

## 2019-11-09 PROCEDURE — 1036F TOBACCO NON-USER: CPT | Performed by: FAMILY MEDICINE

## 2019-11-09 PROCEDURE — G8428 CUR MEDS NOT DOCUMENT: HCPCS | Performed by: FAMILY MEDICINE

## 2019-11-09 PROCEDURE — 3052F HG A1C>EQUAL 8.0%<EQUAL 9.0%: CPT | Performed by: FAMILY MEDICINE

## 2019-11-09 PROCEDURE — 2022F DILAT RTA XM EVC RTNOPTHY: CPT | Performed by: FAMILY MEDICINE

## 2019-11-09 PROCEDURE — 3017F COLORECTAL CA SCREEN DOC REV: CPT | Performed by: FAMILY MEDICINE

## 2019-11-09 PROCEDURE — G8417 CALC BMI ABV UP PARAM F/U: HCPCS | Performed by: FAMILY MEDICINE

## 2019-11-09 PROCEDURE — 99213 OFFICE O/P EST LOW 20 MIN: CPT | Performed by: FAMILY MEDICINE

## 2019-11-09 PROCEDURE — G8484 FLU IMMUNIZE NO ADMIN: HCPCS | Performed by: FAMILY MEDICINE

## 2019-11-10 RX ORDER — MIRABEGRON 25 MG/1
25 TABLET, FILM COATED, EXTENDED RELEASE ORAL DAILY
Qty: 30 TABLET | Refills: 5 | Status: SHIPPED | OUTPATIENT
Start: 2019-11-10 | End: 2019-12-20

## 2019-11-11 ENCOUNTER — TELEPHONE (OUTPATIENT)
Dept: PRIMARY CARE CLINIC | Age: 64
End: 2019-11-11

## 2019-11-11 DIAGNOSIS — Z79.4 TYPE 2 DIABETES MELLITUS WITHOUT COMPLICATION, WITH LONG-TERM CURRENT USE OF INSULIN (HCC): Primary | ICD-10-CM

## 2019-11-11 DIAGNOSIS — E11.9 TYPE 2 DIABETES MELLITUS WITHOUT COMPLICATION, WITH LONG-TERM CURRENT USE OF INSULIN (HCC): Primary | ICD-10-CM

## 2019-11-12 ENCOUNTER — PATIENT MESSAGE (OUTPATIENT)
Dept: PRIMARY CARE CLINIC | Age: 64
End: 2019-11-12

## 2019-11-13 ENCOUNTER — TELEPHONE (OUTPATIENT)
Dept: PRIMARY CARE CLINIC | Age: 64
End: 2019-11-13

## 2019-11-16 DIAGNOSIS — F41.9 ANXIETY: ICD-10-CM

## 2019-11-18 RX ORDER — DIAZEPAM 5 MG/1
TABLET ORAL
Qty: 10 TABLET | Refills: 0 | Status: SHIPPED | OUTPATIENT
Start: 2019-11-18 | End: 2020-01-24 | Stop reason: SDUPTHER

## 2019-12-20 ENCOUNTER — OFFICE VISIT (OUTPATIENT)
Dept: PRIMARY CARE CLINIC | Age: 64
End: 2019-12-20
Payer: MEDICARE

## 2019-12-20 VITALS
RESPIRATION RATE: 18 BRPM | HEART RATE: 104 BPM | BODY MASS INDEX: 38.34 KG/M2 | HEIGHT: 68 IN | WEIGHT: 253 LBS | OXYGEN SATURATION: 99 % | DIASTOLIC BLOOD PRESSURE: 70 MMHG | SYSTOLIC BLOOD PRESSURE: 110 MMHG

## 2019-12-20 DIAGNOSIS — F40.01 PANIC DISORDER WITH AGORAPHOBIA: ICD-10-CM

## 2019-12-20 DIAGNOSIS — E11.9 TYPE 2 DIABETES MELLITUS WITHOUT COMPLICATION, WITH LONG-TERM CURRENT USE OF INSULIN (HCC): Primary | ICD-10-CM

## 2019-12-20 DIAGNOSIS — F32.A DEPRESSION, UNSPECIFIED DEPRESSION TYPE: ICD-10-CM

## 2019-12-20 DIAGNOSIS — Z79.4 TYPE 2 DIABETES MELLITUS WITHOUT COMPLICATION, WITH LONG-TERM CURRENT USE OF INSULIN (HCC): Primary | ICD-10-CM

## 2019-12-20 PROCEDURE — 1036F TOBACCO NON-USER: CPT | Performed by: FAMILY MEDICINE

## 2019-12-20 PROCEDURE — 3017F COLORECTAL CA SCREEN DOC REV: CPT | Performed by: FAMILY MEDICINE

## 2019-12-20 PROCEDURE — 2022F DILAT RTA XM EVC RTNOPTHY: CPT | Performed by: FAMILY MEDICINE

## 2019-12-20 PROCEDURE — G8484 FLU IMMUNIZE NO ADMIN: HCPCS | Performed by: FAMILY MEDICINE

## 2019-12-20 PROCEDURE — 3045F PR MOST RECENT HEMOGLOBIN A1C LEVEL 7.0-9.0%: CPT | Performed by: FAMILY MEDICINE

## 2019-12-20 PROCEDURE — G8417 CALC BMI ABV UP PARAM F/U: HCPCS | Performed by: FAMILY MEDICINE

## 2019-12-20 PROCEDURE — 99214 OFFICE O/P EST MOD 30 MIN: CPT | Performed by: FAMILY MEDICINE

## 2019-12-20 PROCEDURE — G8427 DOCREV CUR MEDS BY ELIG CLIN: HCPCS | Performed by: FAMILY MEDICINE

## 2019-12-20 RX ORDER — ARIPIPRAZOLE 2 MG/1
2 TABLET ORAL DAILY
Qty: 30 TABLET | Refills: 3 | Status: SHIPPED | OUTPATIENT
Start: 2019-12-20 | End: 2020-02-15 | Stop reason: SDUPTHER

## 2019-12-24 RX ORDER — LEVOCETIRIZINE DIHYDROCHLORIDE 5 MG/1
TABLET, FILM COATED ORAL
Qty: 90 TABLET | Refills: 3 | Status: SHIPPED | OUTPATIENT
Start: 2019-12-24 | End: 2020-02-15 | Stop reason: SDUPTHER

## 2020-01-02 ENCOUNTER — TELEPHONE (OUTPATIENT)
Dept: PRIMARY CARE CLINIC | Age: 65
End: 2020-01-02

## 2020-01-06 ENCOUNTER — TELEPHONE (OUTPATIENT)
Dept: PRIMARY CARE CLINIC | Age: 65
End: 2020-01-06

## 2020-01-06 NOTE — PROGRESS NOTES
I ordered the test, the bilateral diagnostic mammography. Let the patient know that a diagnostic mammogram by definition is not a screening test.  It is either diagnostic or it is screening but not both.   Also, fax the order to the appropriate radiology department

## 2020-01-07 NOTE — TELEPHONE ENCOUNTER
Pt called in and stated that she needed a new fill for a medication called  pepcide or something that will help her with gastric issues  pt stated that she has a fever but will not give me the symptoms and or temp when I asked for them she said I don't have time for that , then she said please ask for 90 day supply of the the medication I cant find the medication on her list she stated that she has beenon them.     Cris Head [address] [place] [address], [address] [place] [address],[address] [facility] -  [phone]

## 2020-01-09 ENCOUNTER — TELEPHONE (OUTPATIENT)
Dept: PSYCHOLOGY | Age: 65
End: 2020-01-09

## 2020-01-13 ENCOUNTER — TELEPHONE (OUTPATIENT)
Dept: PRIMARY CARE CLINIC | Age: 65
End: 2020-01-13

## 2020-01-13 NOTE — TELEPHONE ENCOUNTER
Pt is having back pain for 5 days and tingling by one of her kidneys and would like to have  call her. She is requesting a blood drawl and labs. Please call at [phone].      LOV 12/20/19

## 2020-01-14 ENCOUNTER — TELEPHONE (OUTPATIENT)
Dept: PRIMARY CARE CLINIC | Age: 65
End: 2020-01-14

## 2020-01-14 RX ORDER — DIAZEPAM 5 MG/1
TABLET ORAL
Qty: 10 TABLET | Refills: 0 | OUTPATIENT
Start: 2020-01-14

## 2020-01-14 NOTE — TELEPHONE ENCOUNTER
I can see her Tuesday afternoon the 14th. Have her make an appointment then please.   I wanted to see her back this month anyway so we can make tomorrow the day she comes back

## 2020-01-16 ENCOUNTER — OFFICE VISIT (OUTPATIENT)
Dept: PRIMARY CARE CLINIC | Age: 65
End: 2020-01-16
Payer: MEDICARE

## 2020-01-16 VITALS
DIASTOLIC BLOOD PRESSURE: 70 MMHG | HEIGHT: 68 IN | SYSTOLIC BLOOD PRESSURE: 98 MMHG | BODY MASS INDEX: 39.59 KG/M2 | HEART RATE: 91 BPM | OXYGEN SATURATION: 98 % | WEIGHT: 261.2 LBS

## 2020-01-16 PROCEDURE — 3046F HEMOGLOBIN A1C LEVEL >9.0%: CPT | Performed by: FAMILY MEDICINE

## 2020-01-16 PROCEDURE — 3017F COLORECTAL CA SCREEN DOC REV: CPT | Performed by: FAMILY MEDICINE

## 2020-01-16 PROCEDURE — G8417 CALC BMI ABV UP PARAM F/U: HCPCS | Performed by: FAMILY MEDICINE

## 2020-01-16 PROCEDURE — 2022F DILAT RTA XM EVC RTNOPTHY: CPT | Performed by: FAMILY MEDICINE

## 2020-01-16 PROCEDURE — 99213 OFFICE O/P EST LOW 20 MIN: CPT | Performed by: FAMILY MEDICINE

## 2020-01-16 PROCEDURE — 1036F TOBACCO NON-USER: CPT | Performed by: FAMILY MEDICINE

## 2020-01-16 PROCEDURE — G8484 FLU IMMUNIZE NO ADMIN: HCPCS | Performed by: FAMILY MEDICINE

## 2020-01-16 PROCEDURE — G8427 DOCREV CUR MEDS BY ELIG CLIN: HCPCS | Performed by: FAMILY MEDICINE

## 2020-01-16 ASSESSMENT — PATIENT HEALTH QUESTIONNAIRE - PHQ9: DEPRESSION UNABLE TO ASSESS: FUNCTIONAL CAPACITY MOTIVATION LIMITS ACCURACY

## 2020-01-17 RX ORDER — ESZOPICLONE 3 MG/1
3 TABLET, FILM COATED ORAL NIGHTLY PRN
Qty: 10 TABLET | Refills: 0 | Status: SHIPPED | OUTPATIENT
Start: 2020-01-17 | End: 2020-01-27

## 2020-01-24 RX ORDER — DIAZEPAM 5 MG/1
5 TABLET ORAL DAILY PRN
Qty: 10 TABLET | Refills: 0 | Status: SHIPPED | OUTPATIENT
Start: 2020-01-24 | End: 2020-03-04

## 2020-01-28 NOTE — PROGRESS NOTES
Behavioral Health Consultation  Soledad Lebron Psy.D. Clinical Psychologist  1/29/2020       Time spent with Patient: 30 minutes  This is patient's first [facility] appointment. Referring provider: Carson Stern MD   Reason for Consult:  Depression and anxiety     Feedback for PCP: No action needed. Writer will continue to follow pt. S:    Pt reports sleep disturbance since being a teen. Does has sleep apnea and is using CPAP. Would also like to learn to cope with life better: , daughter's health, and relationship with son-in-law. Pt reports a history depression and anxiety. States that anxiety has come and gone for many years and will last a month at the longest.     Depression sx: anhedonia/diminished interest in activities, depressed mood, changes in appetite or weight, insomnia and fatigue, symptoms have been present most recently for the past few months, longest period of depression has been a month   Anxiety sx: excessive worry, uncontrollable worry, fatigue, difficulty concentrating, irritability, muscle tension and sleep disturbance, worries about older daughter, younger daughter, economy   SI/HI: Patient reports that in the past she has had passive thoughts of death, but denied current suicidal ideation, intent, or plan. States that if something were to happen to her older daughter who is terminally ill, she may have suicidal ideation. However, she states that her younger daughter is a protective factor. Patient's older daughter has not been given a prognosis in terms of expected death. Patient denies indications that older daughter will pass away in the near future. Patient does not appear to be an immediate risk to herself at this time.  Denied HI   Coping skills: Spending time with daughter    History:    Health habits:   Caffeine: 2 cups of coffee per day   Sleep: difficulties falling asleep; 8 with Ambien, without Ambien stays up all night, does not nap during the

## 2020-01-29 ENCOUNTER — OFFICE VISIT (OUTPATIENT)
Dept: PSYCHOLOGY | Age: 65
End: 2020-01-29
Payer: MEDICARE

## 2020-01-29 PROCEDURE — 90791 PSYCH DIAGNOSTIC EVALUATION: CPT | Performed by: PSYCHOLOGIST

## 2020-01-29 SDOH — HEALTH STABILITY: MENTAL HEALTH: HOW OFTEN DO YOU HAVE A DRINK CONTAINING ALCOHOL?: NEVER

## 2020-01-29 ASSESSMENT — PATIENT HEALTH QUESTIONNAIRE - PHQ9
1. LITTLE INTEREST OR PLEASURE IN DOING THINGS: 2
4. FEELING TIRED OR HAVING LITTLE ENERGY: 1
SUM OF ALL RESPONSES TO PHQ QUESTIONS 1-9: 9
8. MOVING OR SPEAKING SO SLOWLY THAT OTHER PEOPLE COULD HAVE NOTICED. OR THE OPPOSITE, BEING SO FIGETY OR RESTLESS THAT YOU HAVE BEEN MOVING AROUND A LOT MORE THAN USUAL: 1
5. POOR APPETITE OR OVEREATING: 1
9. THOUGHTS THAT YOU WOULD BE BETTER OFF DEAD, OR OF HURTING YOURSELF: 0
3. TROUBLE FALLING OR STAYING ASLEEP: 3
6. FEELING BAD ABOUT YOURSELF - OR THAT YOU ARE A FAILURE OR HAVE LET YOURSELF OR YOUR FAMILY DOWN: 0
SUM OF ALL RESPONSES TO PHQ9 QUESTIONS 1 & 2: 3
2. FEELING DOWN, DEPRESSED OR HOPELESS: 1
7. TROUBLE CONCENTRATING ON THINGS, SUCH AS READING THE NEWSPAPER OR WATCHING TELEVISION: 0
SUM OF ALL RESPONSES TO PHQ QUESTIONS 1-9: 9

## 2020-01-29 ASSESSMENT — ANXIETY QUESTIONNAIRES
GAD7 TOTAL SCORE: 9
3. WORRYING TOO MUCH ABOUT DIFFERENT THINGS: 2-OVER HALF THE DAYS
6. BECOMING EASILY ANNOYED OR IRRITABLE: 1-SEVERAL DAYS
2. NOT BEING ABLE TO STOP OR CONTROL WORRYING: 1-SEVERAL DAYS
5. BEING SO RESTLESS THAT IT IS HARD TO SIT STILL: 0-NOT AT ALL
4. TROUBLE RELAXING: 1-SEVERAL DAYS
7. FEELING AFRAID AS IF SOMETHING AWFUL MIGHT HAPPEN: 2-OVER HALF THE DAYS
1. FEELING NERVOUS, ANXIOUS, OR ON EDGE: 2-OVER HALF THE DAYS

## 2020-02-03 NOTE — TELEPHONE ENCOUNTER
Medication:   Requested Prescriptions     Pending Prescriptions Disp Refills    NEEDLE, DISP, 30 G (BD DISP NEEDLES) 30G X 1/2\" MISC 90 each 2     Sig: Diabetes  E11.9  Tests  bid       Last Filled:      Patient Phone Number: [phone] (home)     Last appt: 1/16/2020   Next appt: 2/17/2020    Last Labs DM:   Lab Results   Component Value Date    LABA1C 9.0 09/04/2019       Last OARRS: No flowsheet data found.     Preferred Pharmacy:   [facility] [address] [place] [address] [phone] [place] [address] [phone]  [address] [place] [address]  Phone: [phone] Fax: [phone]    [place] [address] - P [phone] [facility] [phone]  [address] [place] [address]  Phone: [phone] Fax: [phone]

## 2020-02-14 ENCOUNTER — TELEPHONE (OUTPATIENT)
Dept: PRIMARY CARE CLINIC | Age: 65
End: 2020-02-14

## 2020-02-17 RX ORDER — LISINOPRIL 2.5 MG/1
2.5 TABLET ORAL DAILY
Qty: 90 TABLET | Refills: 1 | Status: SHIPPED | OUTPATIENT
Start: 2020-02-17 | End: 2020-03-11 | Stop reason: SDUPTHER

## 2020-02-17 RX ORDER — ARIPIPRAZOLE 2 MG/1
2 TABLET ORAL DAILY
Qty: 30 TABLET | Refills: 3 | Status: SHIPPED | OUTPATIENT
Start: 2020-02-17 | End: 2020-03-11 | Stop reason: SDUPTHER

## 2020-02-17 RX ORDER — PANTOPRAZOLE SODIUM 40 MG/1
40 TABLET, DELAYED RELEASE ORAL DAILY
Qty: 180 TABLET | Refills: 0 | Status: SHIPPED | OUTPATIENT
Start: 2020-02-17 | End: 2020-03-11 | Stop reason: SDUPTHER

## 2020-02-17 RX ORDER — LEVOCETIRIZINE DIHYDROCHLORIDE 5 MG/1
5 TABLET, FILM COATED ORAL NIGHTLY
Qty: 90 TABLET | Refills: 3 | Status: SHIPPED | OUTPATIENT
Start: 2020-02-17 | End: 2021-06-08

## 2020-03-04 RX ORDER — DIAZEPAM 5 MG/1
TABLET ORAL
Qty: 10 TABLET | Refills: 0 | Status: SHIPPED | OUTPATIENT
Start: 2020-03-04 | End: 2020-03-11 | Stop reason: SDUPTHER

## 2020-03-04 NOTE — TELEPHONE ENCOUNTER
Medication:   Requested Prescriptions     Pending Prescriptions Disp Refills    diazePAM (VALIUM) 5 MG tablet [Pharmacy Med Name: DIAZEPAM 5 MG TABLET] 10 tablet 0     Sig: TAKE ONE TABLET BY MOUTH DAILY AS NEEDED FOR ANXIETY FOR UP TO 10 DOSES        Last Filled:      Patient Phone Number: [phone] (home)     Last appt: 1/16/2020   Next appt: 3/13/2020    Last OARRS: No flowsheet data found.     Preferred Pharmacy:   [address] [phone] [facility] [phone]  [address] [place] [address]  Phone: [phone] Fax: [phone]    [address] [facility] -  [phone] [facility] [phone]  [address] [place] [address]  Phone: [phone] Fax: [phone]

## 2020-03-05 ENCOUNTER — CARE COORDINATION (OUTPATIENT)
Dept: CARE COORDINATION | Age: 65
End: 2020-03-05

## 2020-03-05 NOTE — CARE COORDINATION
Mailed to patient introductory CM letter. Plan  Offer CM    Linda Esquivel.  Laura Brady RN, BSN, [address] [place] [address] Primary Care  [phone]

## 2020-03-10 ENCOUNTER — CARE COORDINATION (OUTPATIENT)
Dept: CARE COORDINATION | Age: 65
End: 2020-03-10

## 2020-03-10 RX ORDER — HYDROXYZINE 50 MG/1
TABLET, FILM COATED ORAL
Qty: 90 TABLET | Refills: 1 | Status: SHIPPED | OUTPATIENT
Start: 2020-03-10 | End: 2020-05-26

## 2020-03-10 NOTE — TELEPHONE ENCOUNTER
Medication:   Requested Prescriptions     Pending Prescriptions Disp Refills    hydrOXYzine (ATARAX) 50 MG tablet [Pharmacy Med Name: hydrOXYzine HCL 50 MG TABLET] 90 tablet 1     Sig: TAKE ONE TABLET BY MOUTH EVERY 8 HOURS AS NEEDED FOR ANXIETY         Last appt: 1/16/2020   Next appt: 3/13/2020    Last OARRS: No flowsheet data found.

## 2020-03-11 RX ORDER — DIAZEPAM 5 MG/1
5 TABLET ORAL NIGHTLY PRN
Qty: 10 TABLET | Refills: 0 | Status: SHIPPED | OUTPATIENT
Start: 2020-03-11 | End: 2020-03-31

## 2020-03-11 RX ORDER — PANTOPRAZOLE SODIUM 40 MG/1
40 TABLET, DELAYED RELEASE ORAL DAILY
Qty: 180 TABLET | Refills: 0 | Status: SHIPPED | OUTPATIENT
Start: 2020-03-11 | End: 2020-09-17 | Stop reason: SDUPTHER

## 2020-03-11 RX ORDER — ARIPIPRAZOLE 2 MG/1
2 TABLET ORAL DAILY
Qty: 30 TABLET | Refills: 3 | Status: SHIPPED | OUTPATIENT
Start: 2020-03-11 | End: 2020-06-02 | Stop reason: SDUPTHER

## 2020-03-11 RX ORDER — IPRATROPIUM BROMIDE 42 UG/1
1 SPRAY, METERED NASAL 3 TIMES DAILY
Qty: 1 BOTTLE | Refills: 0 | Status: SHIPPED | OUTPATIENT
Start: 2020-03-11 | End: 2020-09-17 | Stop reason: SDUPTHER

## 2020-03-11 RX ORDER — INSULIN ASPART 100 [IU]/ML
INJECTION, SOLUTION INTRAVENOUS; SUBCUTANEOUS
Qty: 5 PEN | Refills: 1 | Status: SHIPPED | OUTPATIENT
Start: 2020-03-11 | End: 2020-05-18

## 2020-03-11 RX ORDER — ZOLPIDEM TARTRATE 10 MG/1
10 TABLET ORAL NIGHTLY PRN
Qty: 30 TABLET | Refills: 2 | Status: SHIPPED | OUTPATIENT
Start: 2020-03-11 | End: 2020-04-24

## 2020-03-11 RX ORDER — NEEDLES, DISPOSABLE 25GX5/8"
NEEDLE, DISPOSABLE MISCELLANEOUS
Qty: 90 EACH | Refills: 5 | Status: SHIPPED | OUTPATIENT
Start: 2020-03-11 | End: 2021-06-08

## 2020-03-11 RX ORDER — LISINOPRIL 2.5 MG/1
2.5 TABLET ORAL DAILY
Qty: 90 TABLET | Refills: 1 | Status: SHIPPED | OUTPATIENT
Start: 2020-03-11 | End: 2020-05-19

## 2020-03-30 NOTE — TELEPHONE ENCOUNTER
Medication:   Requested Prescriptions     Pending Prescriptions Disp Refills    diazePAM (VALIUM) 5 MG tablet [Pharmacy Med Name: DIAZEPAM 5 MG TABLET] 10 tablet 0     Sig: TAKE ONE TABLET BY MOUTH ONCE NIGHTLY AS NEEDED FOR ANXIETY FOR UP TO 30 DAYS        Last Filled:      Patient Phone Number: [phone] (home)     Last appt: 1/16/2020   Next appt: Visit date not found    Last OARRS: No flowsheet data found.     Preferred Pharmacy:   [address] [phone] [facility] [phone]  [address] [place] [address]  Phone: [phone] Fax: [phone]    [address] [phone] [facility] [phone]  [address] [place] [id number]  Phone: [phone] Fax: [phone]

## 2020-03-31 RX ORDER — DIAZEPAM 5 MG/1
TABLET ORAL
Qty: 10 TABLET | Refills: 0 | Status: SHIPPED | OUTPATIENT
Start: 2020-03-31 | End: 2020-04-27

## 2020-04-13 ENCOUNTER — NURSE TRIAGE (OUTPATIENT)
Dept: OTHER | Facility: CLINIC | Age: 65
End: 2020-04-13

## 2020-04-13 NOTE — TELEPHONE ENCOUNTER
symptoms? \"  (e.g., runny nose, headache, sore throat, loss of smell)        Runny nose off and on, general aches    Answer Assessment - Initial Assessment Questions  1. CLOSE CONTACT: \"Who is the person with the confirmed or suspected COVID-19 infection that you were exposed to?\"      Daughter in hospital with positive Covid  2. PLACE of CONTACT: \"Where were you when you were exposed to COVID-19? \" (e.g., home, school, medical waiting room; which city?)      States With  daughter one week ago  3. TYPE of CONTACT: \"How much contact was there? \" (e.g., sitting next to, live in same house, work in same office, same building)      unsure  4. DURATION of CONTACT: \"How long were you in contact with the COVID-19 patient? \" (e.g., a few seconds, passed by person, a few minutes, live with the patient)      unsure  5. DATE of CONTACT: \"When did you have contact with a COVID-19 patient? \" (e.g., how many days ago)      unsure  6. TRAVEL: \"Have you traveled out of the country recently? \" If so, \"When and where? \"      * Also ask about out-of-state travel, since the [facility] has identified some high risk cities for community spread in the [address] [place] [address],[address]. * Note: Travel becomes less relevant if there is widespread community transmission where the patient lives. no  7. COMMUNITY SPREAD: \"Are there lots of cases or COVID-19 (community spread) where you live? \" (See public health department website, if unsure)    * MAJOR community spread: high number of cases; numbers of cases are increasing; many people hospitalized. * MINOR community spread: low number of cases; not increasing; few or no people hospitalized        8. SYMPTOMS: \"Do you have any symptoms? \" (e.g., fever, cough, breathing difficulty)      Dry cough, fever 100.8  . PREGNANCY OR POSTPARTUM: \"Is there any chance you are pregnant? \" \"When was your last menstrual period? \" \"Did you deliver in the last 2 weeks? \"      n/a  10. HIGH RISK: \"Do you have any heart or lung problems?  Do

## 2020-04-16 ENCOUNTER — TELEPHONE (OUTPATIENT)
Dept: PRIMARY CARE CLINIC | Age: 65
End: 2020-04-16

## 2020-04-20 RX ORDER — ZOLPIDEM TARTRATE 10 MG/1
TABLET ORAL
Qty: 30 TABLET | Refills: 0 | OUTPATIENT
Start: 2020-04-20

## 2020-04-20 NOTE — TELEPHONE ENCOUNTER
Medication:   Requested Prescriptions     Pending Prescriptions Disp Refills    zolpidem (AMBIEN) 10 MG tablet [Pharmacy Med Name: ZOLPIDEM TARTRATE 10 MG TABLET] 30 tablet 0     Sig: TAKE ONE TABLET BY MOUTH ONCE NIGHTLY AS NEEDED FOR SLEEP FOR UP TO 90 DAYS.      Last Filled:  3/11/20 # 30 x 2    Last appt: 1/16/2020   Next appt: Visit date not found

## 2020-04-21 ENCOUNTER — TELEPHONE (OUTPATIENT)
Dept: PRIMARY CARE CLINIC | Age: 65
End: 2020-04-21

## 2020-04-23 ENCOUNTER — TELEPHONE (OUTPATIENT)
Dept: PRIMARY CARE CLINIC | Age: 65
End: 2020-04-23

## 2020-04-23 NOTE — TELEPHONE ENCOUNTER
Celia Abdalla from central scheduling is calling. She states an Augmented diagnostic mammogram order was put in for pt. This order is for pts with breast implants. The pt does not have breast implants. Please put an order in for a regular diagnostic mammogram. Also, if the order is changed, the diagnosis code will need to be changed. Celia Abdalla tried to run the diagnosis code N64.4 through using just a regular diagnostic mammogram instead of Augmented Diagnostic Mammogram  and Medicare pushed it back saying it was not medically necessary. Please use a different diagnosis code as well.     LOV 1/16/20  FUTURE VISIT VV 4/24/20

## 2020-04-24 ENCOUNTER — VIRTUAL VISIT (OUTPATIENT)
Dept: PRIMARY CARE CLINIC | Age: 65
End: 2020-04-24
Payer: MEDICARE

## 2020-04-24 ENCOUNTER — TELEPHONE (OUTPATIENT)
Dept: PRIMARY CARE CLINIC | Age: 65
End: 2020-04-24

## 2020-04-24 PROCEDURE — 2022F DILAT RTA XM EVC RTNOPTHY: CPT | Performed by: FAMILY MEDICINE

## 2020-04-24 PROCEDURE — G8427 DOCREV CUR MEDS BY ELIG CLIN: HCPCS | Performed by: FAMILY MEDICINE

## 2020-04-24 PROCEDURE — G8417 CALC BMI ABV UP PARAM F/U: HCPCS | Performed by: FAMILY MEDICINE

## 2020-04-24 PROCEDURE — 1036F TOBACCO NON-USER: CPT | Performed by: FAMILY MEDICINE

## 2020-04-24 PROCEDURE — 3046F HEMOGLOBIN A1C LEVEL >9.0%: CPT | Performed by: FAMILY MEDICINE

## 2020-04-24 PROCEDURE — 99214 OFFICE O/P EST MOD 30 MIN: CPT | Performed by: FAMILY MEDICINE

## 2020-04-24 PROCEDURE — 3017F COLORECTAL CA SCREEN DOC REV: CPT | Performed by: FAMILY MEDICINE

## 2020-04-24 RX ORDER — INSULIN DETEMIR 100 [IU]/ML
INJECTION, SOLUTION SUBCUTANEOUS
Qty: 27 ML | Refills: 3 | COMMUNITY
Start: 2020-04-24 | End: 2020-05-18

## 2020-04-24 RX ORDER — ZOLPIDEM TARTRATE 10 MG/1
5 TABLET ORAL NIGHTLY PRN
Qty: 7 TABLET | Refills: 0 | COMMUNITY
Start: 2020-04-24 | End: 2020-05-07 | Stop reason: SDUPTHER

## 2020-04-24 RX ORDER — AMOXICILLIN AND CLAVULANATE POTASSIUM 875; 125 MG/1; MG/1
1 TABLET, FILM COATED ORAL 2 TIMES DAILY
Qty: 20 TABLET | Refills: 0 | Status: SHIPPED | OUTPATIENT
Start: 2020-04-24 | End: 2020-05-04

## 2020-04-24 NOTE — PROGRESS NOTES
2020    TELEHEALTH EVALUATION -- Audio/Visual (During BDPZM-36 public health emergency)    HPI:    Caren Gilbert (:  1955) has requested an audio/video evaluation for the following concern(s):      Messi Napoles is had numerous sinusitis infections over the last few years. About 2 weeks ago she was having pressure in her forehead and orbits so she went to [facility] emergency department where she was tested negative for COVID-19 but was treated for sinusitis with 10 days of Augmentin twice daily when she went to the emergency department her temperature was 101 degrees. Since then she has also developed a dry cough, and although she finished her Augmentin she still is having facial pain and tenderness. Blood sugars have been in the 200-220 range in the morning and a little higher later after meals. Review of Systems   Constitutional:        Has been unable to lose weight and would like her thyroid checked    Has been taking Ambien 10 mg at bedtime. I have been recommending that she use this downtime to get off the Ambien. She is willing to try   Skin:        [place] a few weeks ago and landed with her left breast against the edge of a table. That breast is been sore ever since and she has some bilateral breast pain to begin with. PMSHx reviewed and/or updated    Prior to Visit Medications    Medication Sig Taking? Authorizing Provider   cyclobenzaprine (FLEXERIL) 5 MG tablet TAKE ONE TABLET BY MOUTH nightly AT BEDTIME AS NEEDED Yes Earl Caro MD   metFORMIN (GLUCOPHAGE) 500 MG tablet Take 1 tablet by mouth 4 times daily (with meals and nightly) Yes Earl Caro MD   zolpidem (AMBIEN) 10 MG tablet Take 1 tablet by mouth nightly as needed for Sleep for up to 90 days.  TAKE ONE TABLET BY MOUTH ONCE NIGHTLY AS NEEDED FOR SLEEP Yes Earl Caro MD   NEEDLE, DISP, 30 G (BD DISP NEEDLES) 30G X 1/2\" MISC Diabetes  E11.9  Tests  bid Yes Earl Caro MD   lisinopril

## 2020-04-24 NOTE — TELEPHONE ENCOUNTER
Please fax the orders for her diagnostic mammograms and for her lab tests to [facility]

## 2020-05-07 RX ORDER — ZOLPIDEM TARTRATE 10 MG/1
10 TABLET ORAL NIGHTLY PRN
Qty: 30 TABLET | Refills: 0 | Status: SHIPPED | OUTPATIENT
Start: 2020-05-07 | End: 2020-06-09 | Stop reason: SDUPTHER

## 2020-05-07 NOTE — TELEPHONE ENCOUNTER
Pt tried taking a half of an Ambien instead of a whole. She says she couldn't sleep. She wants to have a refill of Ambien. She is asking for an early refill of Ambien.     Send to [address], [place] [address] - F [phone]     LOV 4/24/20

## 2020-05-18 RX ORDER — INSULIN ASPART 100 [IU]/ML
INJECTION, SOLUTION INTRAVENOUS; SUBCUTANEOUS
Qty: 15 ML | Refills: 1 | Status: SHIPPED | OUTPATIENT
Start: 2020-05-18 | End: 2020-05-19 | Stop reason: SDUPTHER

## 2020-05-18 RX ORDER — INSULIN DETEMIR 100 [IU]/ML
INJECTION, SOLUTION SUBCUTANEOUS
Qty: 15 ML | Refills: 0 | Status: SHIPPED | OUTPATIENT
Start: 2020-05-18 | End: 2020-05-19 | Stop reason: SDUPTHER

## 2020-05-18 NOTE — TELEPHONE ENCOUNTER
Patient requesting a medication refill. Medication: insulin detemir (LEVEMIR FLEXTOUCH) 100 UNIT/ML injection                Pharmacy: [facility], [address] [facility] [phone]            Last office visit:   Next office visit: Visit date not found

## 2020-05-19 RX ORDER — INSULIN DETEMIR 100 [IU]/ML
INJECTION, SOLUTION SUBCUTANEOUS
Qty: 15 ML | Refills: 0 | Status: SHIPPED | OUTPATIENT
Start: 2020-05-19 | End: 2020-06-02 | Stop reason: SDUPTHER

## 2020-05-19 RX ORDER — INSULIN ASPART 100 [IU]/ML
INJECTION, SOLUTION INTRAVENOUS; SUBCUTANEOUS
Qty: 15 ML | Refills: 1 | Status: SHIPPED | OUTPATIENT
Start: 2020-05-19 | End: 2020-06-02 | Stop reason: SDUPTHER

## 2020-05-19 RX ORDER — LISINOPRIL 2.5 MG/1
TABLET ORAL
Qty: 90 TABLET | Refills: 0 | Status: SHIPPED | OUTPATIENT
Start: 2020-05-19 | End: 2020-07-01 | Stop reason: SDUPTHER

## 2020-05-19 NOTE — TELEPHONE ENCOUNTER
Left detailed message for pt. Advised to return call. Called and spoke to daughter Britney Lira per Hipaa gave her detailed messsge as well. She will call back with a lab to fax the orders to.

## 2020-05-19 NOTE — TELEPHONE ENCOUNTER
Medication:   Requested Prescriptions     Pending Prescriptions Disp Refills    lisinopril (PRINIVIL;ZESTRIL) 2.5 MG tablet [Pharmacy Med Name: LISINOPRIL 2.5 MG TABLET] 90 tablet 0     Sig: TAKE ONE TABLET BY MOUTH DAILY         Last appt: 1/16/2020   Next appt: Visit date not found    Last OARRS: No flowsheet data found.

## 2020-05-20 RX ORDER — SERTRALINE HYDROCHLORIDE 100 MG/1
TABLET, FILM COATED ORAL
Qty: 180 TABLET | Refills: 0 | Status: SHIPPED | OUTPATIENT
Start: 2020-05-20 | End: 2020-07-01 | Stop reason: SDUPTHER

## 2020-05-28 ENCOUNTER — NURSE TRIAGE (OUTPATIENT)
Dept: OTHER | Facility: CLINIC | Age: 65
End: 2020-05-28

## 2020-05-28 NOTE — TELEPHONE ENCOUNTER
Reason for Disposition   MODERATE weakness (i.e., interferes with work, school, normal activities) and persists > 3 days    Answer Assessment - Initial Assessment Questions  1. DESCRIPTION: \"Describe how you are feeling. \"      Weak, dizzy, foggy and lightheaded. Stomach pain and has diarrhea off and on for 4-5 days  2. SEVERITY: \"How bad is it? \"  \"Can you stand and walk? \"    - MILD - Feels weak or tired, but does not interfere with work, school or normal activities    - [facility] to stand and walk; weakness interferes with work, school, or normal activities    - SEVERE - Unable to stand or walk     mild  3. ONSET:  \"When did the weakness begin? \"      4 or 5 days ago  4. CAUSE: \"What do you think is causing the weakness? \"     Afraid she has the coronavirus  5. MEDICINES: Charli Hannah you recently started a new medicine or had a change in the amount of a medicine? \"      no  6. OTHER SYMPTOMS: \"Do you have any other symptoms? \" (e.g., chest pain, fever, cough, SOB, vomiting, diarrhea, bleeding, other areas of pain)     diarrhea  7. PREGNANCY: \"Is there any chance you are pregnant? \" \"When was your last menstrual period? \"      na    Protocols used: WEAKNESS (GENERALIZED) AND FATIGUE-ADULT-[place]    Feel bad, BS are over 300 in the morning, feel weak, foggy lightheaded and dizzy almost fell last night from dizziness, stomach hurts having bowel movements and urinating ok , having pain around where bladder is. , has chronic sinusitis, no appetite, 4-5 days of intermittent diarrhea. Per protocol recommend to see PCP today if possible     Received call from [facility]. Call soft transferred to [facility] [address] to schedule appointment. Please do not reply to the triage nurse through this encounter. Any subsequent communication should be directly with the patient.

## 2020-06-01 ENCOUNTER — TELEPHONE (OUTPATIENT)
Dept: PRIMARY CARE CLINIC | Age: 65
End: 2020-06-01

## 2020-06-01 RX ORDER — DIAZEPAM 5 MG/1
TABLET ORAL
Qty: 30 TABLET | Refills: 0 | Status: SHIPPED | OUTPATIENT
Start: 2020-06-01 | End: 2020-07-01 | Stop reason: SDUPTHER

## 2020-06-01 NOTE — TELEPHONE ENCOUNTER
Medication:   Requested Prescriptions     Pending Prescriptions Disp Refills    diazePAM (VALIUM) 5 MG tablet [Pharmacy Med Name: DIAZEPAM 5 MG TABLET] 30 tablet 0     Sig: TAKE ONE TABLET BY MOUTH ONCE NIGHTLY AS NEEDED FOR ANXIETY FOR UP TO 30 DAYS       Last appt: 1/16/2020   Next appt: 6/1/2020    Last OARRS: No flowsheet data found.

## 2020-06-02 RX ORDER — INSULIN DETEMIR 100 [IU]/ML
INJECTION, SOLUTION SUBCUTANEOUS
Qty: 15 ML | Refills: 0 | Status: SHIPPED | OUTPATIENT
Start: 2020-06-02 | End: 2020-07-01

## 2020-06-02 RX ORDER — DIAZEPAM 5 MG/1
TABLET ORAL
Qty: 30 TABLET | Refills: 0 | OUTPATIENT
Start: 2020-06-02 | End: 2020-07-02

## 2020-06-02 RX ORDER — SPIRONOLACTONE 25 MG/1
TABLET ORAL
Qty: 45 TABLET | Refills: 0 | OUTPATIENT
Start: 2020-06-02

## 2020-06-02 RX ORDER — SPIRONOLACTONE 25 MG/1
12.5 TABLET ORAL DAILY
Qty: 45 TABLET | Refills: 0 | Status: SHIPPED | OUTPATIENT
Start: 2020-06-02 | End: 2021-06-08

## 2020-06-02 RX ORDER — INSULIN ASPART 100 [IU]/ML
INJECTION, SOLUTION INTRAVENOUS; SUBCUTANEOUS
Qty: 15 ML | Refills: 1 | Status: ON HOLD | OUTPATIENT
Start: 2020-06-02 | End: 2020-07-29 | Stop reason: HOSPADM

## 2020-06-02 NOTE — TELEPHONE ENCOUNTER
Medication:   Requested Prescriptions     Pending Prescriptions Disp Refills    diazePAM (VALIUM) 5 MG tablet 30 tablet 0     Last Filled:    Last appt: 1/16/2020   Next appt: Visit date not found    Last OARRS: No flowsheet data found.

## 2020-06-02 NOTE — TELEPHONE ENCOUNTER
Medication:   Requested Prescriptions     Pending Prescriptions Disp Refills    insulin detemir (LEVEMIR FLEXTOUCH) 100 UNIT/ML injection pen 15 mL 0     Sig: INJECT 27 units SUBCUTANEOUSLY (UNDER THE SKIN) nightly, INCREASE by 2 UNITS EVERY 3 DAYS UNTIL morning sugar is near 120 (max 45 UNITS PER DAY)    Insulin Aspart FlexPen 100 UNIT/ML SOPN 15 mL 1     Sig: INJECT 20 units SUBCUTANEOUSLY (UNDER THE SKIN) THREE TIMES DAILY BEFORE each meal     Refused Prescriptions Disp Refills    spironolactone (ALDACTONE) 25 MG tablet 45 tablet 0     Last Filled: 5.19.20  Last appt: 4.24.20   Next appt: Visit date not found    Last Labs DM:   Lab Results   Component Value Date    LABA1C 9.0 09/04/2019

## 2020-06-09 RX ORDER — ZOLPIDEM TARTRATE 10 MG/1
10 TABLET ORAL NIGHTLY PRN
Qty: 30 TABLET | Refills: 0 | Status: SHIPPED | OUTPATIENT
Start: 2020-06-09 | End: 2020-07-09

## 2020-06-15 NOTE — TELEPHONE ENCOUNTER
Medication:   Requested Prescriptions     Pending Prescriptions Disp Refills    metFORMIN (GLUCOPHAGE) 500 MG tablet [Pharmacy Med Name: metFORMIN  MG TABLET] 360 tablet 0     Sig: TAKE ONE TABLET BY MOUTH FOUR TIMES A DAY EVERY NIGHT WITH MEALS     Last Filled:  3.11.20  Last appt: 4.24.20   Next appt: Visit date not found    Last Labs DM:   Lab Results   Component Value Date    LABA1C 9.0 09/04/2019

## 2020-06-22 RX ORDER — SIMVASTATIN 20 MG
20 TABLET ORAL NIGHTLY
Qty: 90 TABLET | Refills: 0 | Status: SHIPPED | OUTPATIENT
Start: 2020-06-22 | End: 2020-09-21

## 2020-06-24 ENCOUNTER — TELEPHONE (OUTPATIENT)
Dept: PRIMARY CARE CLINIC | Age: 65
End: 2020-06-24

## 2020-07-01 ENCOUNTER — HOSPITAL ENCOUNTER (OUTPATIENT)
Age: 65
Discharge: HOME | End: 2020-07-01
Payer: MEDICARE

## 2020-07-01 ENCOUNTER — OFFICE VISIT (OUTPATIENT)
Dept: PRIMARY CARE CLINIC | Age: 65
End: 2020-07-01
Payer: MEDICARE

## 2020-07-01 ENCOUNTER — TELEPHONE (OUTPATIENT)
Dept: PRIMARY CARE CLINIC | Age: 65
End: 2020-07-01

## 2020-07-01 VITALS
WEIGHT: 271 LBS | OXYGEN SATURATION: 96 % | HEART RATE: 100 BPM | BODY MASS INDEX: 41.82 KG/M2 | DIASTOLIC BLOOD PRESSURE: 78 MMHG | SYSTOLIC BLOOD PRESSURE: 132 MMHG | TEMPERATURE: 97.3 F

## 2020-07-01 PROCEDURE — G8428 CUR MEDS NOT DOCUMENT: HCPCS | Performed by: FAMILY MEDICINE

## 2020-07-01 PROCEDURE — 99213 OFFICE O/P EST LOW 20 MIN: CPT | Performed by: FAMILY MEDICINE

## 2020-07-01 PROCEDURE — 3046F HEMOGLOBIN A1C LEVEL >9.0%: CPT | Performed by: FAMILY MEDICINE

## 2020-07-01 PROCEDURE — 3017F COLORECTAL CA SCREEN DOC REV: CPT | Performed by: FAMILY MEDICINE

## 2020-07-01 PROCEDURE — 2022F DILAT RTA XM EVC RTNOPTHY: CPT | Performed by: FAMILY MEDICINE

## 2020-07-01 PROCEDURE — 1036F TOBACCO NON-USER: CPT | Performed by: FAMILY MEDICINE

## 2020-07-01 PROCEDURE — G8417 CALC BMI ABV UP PARAM F/U: HCPCS | Performed by: FAMILY MEDICINE

## 2020-07-01 RX ORDER — LISINOPRIL 5 MG/1
5 TABLET ORAL DAILY
Qty: 90 TABLET | Refills: 1 | Status: SHIPPED | OUTPATIENT
Start: 2020-07-01 | End: 2021-06-08

## 2020-07-01 RX ORDER — DICLOFENAC SODIUM 75 MG/1
75 TABLET, DELAYED RELEASE ORAL 2 TIMES DAILY
Qty: 60 TABLET | Refills: 3 | Status: SHIPPED | OUTPATIENT
Start: 2020-07-01 | End: 2021-09-23 | Stop reason: ALTCHOICE

## 2020-07-01 RX ORDER — SERTRALINE HYDROCHLORIDE 100 MG/1
200 TABLET, FILM COATED ORAL DAILY
Qty: 180 TABLET | Refills: 1 | Status: ON HOLD
Start: 2020-07-01 | End: 2020-07-29 | Stop reason: HOSPADM

## 2020-07-01 RX ORDER — CYCLOBENZAPRINE HCL 5 MG
5 TABLET ORAL 3 TIMES DAILY PRN
Qty: 90 TABLET | Refills: 5 | Status: SHIPPED | OUTPATIENT
Start: 2020-07-01 | End: 2021-09-23 | Stop reason: ALTCHOICE

## 2020-07-01 RX ORDER — INSULIN DETEMIR 100 [IU]/ML
INJECTION, SOLUTION SUBCUTANEOUS
Qty: 15 ML | Refills: 0 | COMMUNITY
Start: 2020-07-01 | End: 2020-10-10

## 2020-07-01 RX ORDER — LEVOTHYROXINE SODIUM 175 MCG
175 TABLET ORAL DAILY
Qty: 30 TABLET | Refills: 11 | Status: SHIPPED | OUTPATIENT
Start: 2020-07-01 | End: 2021-06-08 | Stop reason: SDUPTHER

## 2020-07-01 RX ORDER — DIAZEPAM 5 MG/1
5 TABLET ORAL DAILY PRN
Qty: 30 TABLET | Refills: 2 | Status: SHIPPED | OUTPATIENT
Start: 2020-07-01 | End: 2020-07-09 | Stop reason: SDUPTHER

## 2020-07-01 RX ORDER — ARIPIPRAZOLE 2 MG/1
2 TABLET ORAL DAILY
Qty: 30 TABLET | Refills: 0 | Status: ON HOLD
Start: 2020-07-01 | End: 2020-07-29 | Stop reason: HOSPADM

## 2020-07-01 NOTE — TELEPHONE ENCOUNTER
Pt called today to reschedule her OV for tomorrow if possible. Patient would like Sari García MD to call with availability if not she'll keep her appointment time .

## 2020-07-01 NOTE — PROGRESS NOTES
Subjective:      Patient ID: Smith Rosas is a 60 y.o. female. HPINever titrated the Levemir - takes 25-30 units bid, and her am glucose is ~ 200  Takes Novolog 15-18 units before meals twice daily , depending on # of carb grams to consume     Back has been hurting since 1993. Low back, R , L, or bilateral, with rad to mid shins  R or L has been taking ibup 400 mg  Bid and that helps  Review of Systems    Objective:   Physical Exam  Constitutional:       Appearance: She is obese. Musculoskeletal:      Lumbar back: She exhibits bony tenderness (L5, less so L4, L3). She exhibits normal range of motion, no tenderness and no spasm. Neurological:      Sensory: No sensory deficit. Deep Tendon Reflexes:      Reflex Scores:       Patellar reflexes are 0 on the right side and 0 on the left side. Achilles reflexes are 0 on the right side and 0 on the left side.      Comments: SLR-  EHL=  Heel and toe stands OK       Vitals:    07/01/20 1419   BP: 132/78   Pulse: 100   Temp: 97.3 °F (36.3 °C)   SpO2: 96%   Weight: 271 lb (122.9 kg)       Assessment:    Low back pain, radicular  By history but nothing on PE to corroborate  DM OOC      Plan:      XRay of the LS spine  Diclofenac 75 mg twice daily  Refer to DM education  Gradually increase the bedtime Levemir until morning sugar < 150        Bibiana Gore MD

## 2020-07-04 ENCOUNTER — TELEPHONE (OUTPATIENT)
Dept: PRIMARY CARE CLINIC | Age: 65
End: 2020-07-04

## 2020-07-04 NOTE — TELEPHONE ENCOUNTER
Blood test confirms that your diabetes is out of control and that you need the diabetic education and need to check your sugars and adjust your insulin more often.

## 2020-07-09 NOTE — TELEPHONE ENCOUNTER
Medication:   Requested Prescriptions     Pending Prescriptions Disp Refills    diazePAM (VALIUM) 5 MG tablet 30 tablet 2     Sig: Take 1 tablet by mouth daily as needed for Anxiety for up to 90 days. Last Filled:  7. 1.20  Last appt: 7/1/2020   Next appt: Visit date not found    Last OARRS: No flowsheet data found.

## 2020-07-10 RX ORDER — DIAZEPAM 5 MG/1
5 TABLET ORAL DAILY PRN
Qty: 30 TABLET | Refills: 2 | Status: ON HOLD
Start: 2020-07-10 | End: 2020-07-29 | Stop reason: HOSPADM

## 2020-07-12 PROBLEM — F32.A DEPRESSION: Status: ACTIVE | Noted: 2020-07-12

## 2020-07-13 ENCOUNTER — HOSPITAL ENCOUNTER (INPATIENT)
Age: 65
LOS: 16 days | Discharge: HOME | DRG: 885 | End: 2020-07-29
Attending: PSYCHIATRY & NEUROLOGY | Admitting: PSYCHIATRY & NEUROLOGY
Payer: MEDICARE

## 2020-07-13 LAB
EKG ATRIAL RATE: 108 BPM
EKG DIAGNOSIS: NORMAL
EKG P AXIS: 70 DEGREES
EKG P-R INTERVAL: 180 MS
EKG Q-T INTERVAL: 362 MS
EKG QRS DURATION: 90 MS
EKG QTC CALCULATION (BAZETT): 485 MS
EKG R AXIS: -9 DEGREES
EKG T AXIS: 56 DEGREES
EKG VENTRICULAR RATE: 108 BPM
GLUCOSE BLD-MCNC: 147 MG/DL (ref 70–99)
GLUCOSE BLD-MCNC: 305 MG/DL (ref 70–99)
GLUCOSE BLD-MCNC: 314 MG/DL (ref 70–99)
GLUCOSE BLD-MCNC: 328 MG/DL (ref 70–99)
GLUCOSE BLD-MCNC: 441 MG/DL (ref 70–99)
PERFORMED ON: ABNORMAL
SARS-COV-2, NAAT: NOT DETECTED

## 2020-07-13 PROCEDURE — 6370000000 HC RX 637 (ALT 250 FOR IP): Performed by: PHYSICIAN ASSISTANT

## 2020-07-13 PROCEDURE — 93005 ELECTROCARDIOGRAM TRACING: CPT | Performed by: PSYCHIATRY & NEUROLOGY

## 2020-07-13 PROCEDURE — 97165 OT EVAL LOW COMPLEX 30 MIN: CPT

## 2020-07-13 PROCEDURE — 97530 THERAPEUTIC ACTIVITIES: CPT

## 2020-07-13 PROCEDURE — 99223 1ST HOSP IP/OBS HIGH 75: CPT | Performed by: PHYSICIAN ASSISTANT

## 2020-07-13 PROCEDURE — 6370000000 HC RX 637 (ALT 250 FOR IP): Performed by: PSYCHIATRY & NEUROLOGY

## 2020-07-13 PROCEDURE — 1240000000 HC EMOTIONAL WELLNESS R&B

## 2020-07-13 PROCEDURE — 94660 CPAP INITIATION&MGMT: CPT

## 2020-07-13 PROCEDURE — 97535 SELF CARE MNGMENT TRAINING: CPT

## 2020-07-13 PROCEDURE — U0002 COVID-19 LAB TEST NON-CDC: HCPCS

## 2020-07-13 PROCEDURE — 94761 N-INVAS EAR/PLS OXIMETRY MLT: CPT

## 2020-07-13 PROCEDURE — 99223 1ST HOSP IP/OBS HIGH 75: CPT | Performed by: PSYCHIATRY & NEUROLOGY

## 2020-07-13 PROCEDURE — 93010 ELECTROCARDIOGRAM REPORT: CPT | Performed by: INTERNAL MEDICINE

## 2020-07-13 RX ORDER — HALOPERIDOL 5 MG/ML
2 INJECTION INTRAMUSCULAR EVERY 6 HOURS PRN
Status: DISCONTINUED | OUTPATIENT
Start: 2020-07-13 | End: 2020-07-25

## 2020-07-13 RX ORDER — INSULIN GLARGINE 100 [IU]/ML
25 INJECTION, SOLUTION SUBCUTANEOUS 2 TIMES DAILY
Status: DISCONTINUED | OUTPATIENT
Start: 2020-07-13 | End: 2020-07-15

## 2020-07-13 RX ORDER — SERTRALINE HYDROCHLORIDE 100 MG/1
200 TABLET, FILM COATED ORAL DAILY
Status: DISCONTINUED | OUTPATIENT
Start: 2020-07-13 | End: 2020-07-16

## 2020-07-13 RX ORDER — CYCLOBENZAPRINE HCL 10 MG
5 TABLET ORAL 3 TIMES DAILY PRN
Status: DISCONTINUED | OUTPATIENT
Start: 2020-07-13 | End: 2020-07-29 | Stop reason: HOSPADM

## 2020-07-13 RX ORDER — CETIRIZINE HYDROCHLORIDE 10 MG/1
10 TABLET ORAL DAILY
Status: DISCONTINUED | OUTPATIENT
Start: 2020-07-13 | End: 2020-07-29 | Stop reason: HOSPADM

## 2020-07-13 RX ORDER — NICOTINE POLACRILEX 4 MG
15 LOZENGE BUCCAL PRN
Status: DISCONTINUED | OUTPATIENT
Start: 2020-07-13 | End: 2020-07-29 | Stop reason: HOSPADM

## 2020-07-13 RX ORDER — ATORVASTATIN CALCIUM 10 MG/1
10 TABLET, FILM COATED ORAL DAILY
Status: DISCONTINUED | OUTPATIENT
Start: 2020-07-13 | End: 2020-07-29 | Stop reason: HOSPADM

## 2020-07-13 RX ORDER — TRAZODONE HYDROCHLORIDE 50 MG/1
25 TABLET ORAL NIGHTLY PRN
Status: DISCONTINUED | OUTPATIENT
Start: 2020-07-13 | End: 2020-07-29 | Stop reason: HOSPADM

## 2020-07-13 RX ORDER — PANTOPRAZOLE SODIUM 40 MG/1
40 TABLET, DELAYED RELEASE ORAL DAILY
Status: DISCONTINUED | OUTPATIENT
Start: 2020-07-13 | End: 2020-07-29 | Stop reason: HOSPADM

## 2020-07-13 RX ORDER — LORAZEPAM 0.5 MG/1
0.5 TABLET ORAL EVERY 6 HOURS PRN
Status: DISCONTINUED | OUTPATIENT
Start: 2020-07-13 | End: 2020-07-15

## 2020-07-13 RX ORDER — BUSPIRONE HYDROCHLORIDE 5 MG/1
5 TABLET ORAL 3 TIMES DAILY
Status: DISCONTINUED | OUTPATIENT
Start: 2020-07-13 | End: 2020-07-15

## 2020-07-13 RX ORDER — DEXTROSE MONOHYDRATE 25 G/50ML
12.5 INJECTION, SOLUTION INTRAVENOUS PRN
Status: DISCONTINUED | OUTPATIENT
Start: 2020-07-13 | End: 2020-07-29 | Stop reason: HOSPADM

## 2020-07-13 RX ORDER — SPIRONOLACTONE 25 MG/1
12.5 TABLET ORAL DAILY
Status: DISCONTINUED | OUTPATIENT
Start: 2020-07-13 | End: 2020-07-29 | Stop reason: HOSPADM

## 2020-07-13 RX ORDER — LORAZEPAM 2 MG/ML
1 INJECTION INTRAMUSCULAR EVERY 6 HOURS PRN
Status: DISCONTINUED | OUTPATIENT
Start: 2020-07-13 | End: 2020-07-15

## 2020-07-13 RX ORDER — ASPIRIN 81 MG/1
81 TABLET ORAL DAILY
Status: DISCONTINUED | OUTPATIENT
Start: 2020-07-13 | End: 2020-07-29 | Stop reason: HOSPADM

## 2020-07-13 RX ORDER — BENZTROPINE MESYLATE 1 MG/ML
1 INJECTION INTRAMUSCULAR; INTRAVENOUS 2 TIMES DAILY PRN
Status: DISCONTINUED | OUTPATIENT
Start: 2020-07-13 | End: 2020-07-29 | Stop reason: HOSPADM

## 2020-07-13 RX ORDER — HALOPERIDOL 1 MG/1
2 TABLET ORAL EVERY 6 HOURS PRN
Status: DISCONTINUED | OUTPATIENT
Start: 2020-07-13 | End: 2020-07-25

## 2020-07-13 RX ORDER — DEXTROSE MONOHYDRATE 50 MG/ML
100 INJECTION, SOLUTION INTRAVENOUS PRN
Status: DISCONTINUED | OUTPATIENT
Start: 2020-07-13 | End: 2020-07-29 | Stop reason: HOSPADM

## 2020-07-13 RX ORDER — MAGNESIUM HYDROXIDE/ALUMINUM HYDROXICE/SIMETHICONE 120; 1200; 1200 MG/30ML; MG/30ML; MG/30ML
30 SUSPENSION ORAL EVERY 6 HOURS PRN
Status: DISCONTINUED | OUTPATIENT
Start: 2020-07-13 | End: 2020-07-29 | Stop reason: HOSPADM

## 2020-07-13 RX ORDER — ZOLPIDEM TARTRATE 5 MG/1
10 TABLET ORAL NIGHTLY PRN
Status: DISCONTINUED | OUTPATIENT
Start: 2020-07-13 | End: 2020-07-16

## 2020-07-13 RX ORDER — ACETAMINOPHEN 325 MG/1
650 TABLET ORAL EVERY 4 HOURS PRN
Status: DISCONTINUED | OUTPATIENT
Start: 2020-07-13 | End: 2020-07-29 | Stop reason: HOSPADM

## 2020-07-13 RX ORDER — LISINOPRIL 5 MG/1
5 TABLET ORAL DAILY
Status: DISCONTINUED | OUTPATIENT
Start: 2020-07-13 | End: 2020-07-29 | Stop reason: HOSPADM

## 2020-07-13 RX ADMIN — INSULIN GLARGINE 25 UNITS: 100 INJECTION, SOLUTION SUBCUTANEOUS at 21:37

## 2020-07-13 RX ADMIN — ATORVASTATIN CALCIUM 10 MG: 10 TABLET, FILM COATED ORAL at 14:39

## 2020-07-13 RX ADMIN — LORAZEPAM 0.5 MG: 0.5 TABLET ORAL at 06:59

## 2020-07-13 RX ADMIN — METFORMIN HYDROCHLORIDE 1000 MG: 500 TABLET ORAL at 17:00

## 2020-07-13 RX ADMIN — CYCLOBENZAPRINE HYDROCHLORIDE 5 MG: 10 TABLET, FILM COATED ORAL at 14:39

## 2020-07-13 RX ADMIN — BUSPIRONE HYDROCHLORIDE 5 MG: 5 TABLET ORAL at 14:39

## 2020-07-13 RX ADMIN — BUSPIRONE HYDROCHLORIDE 5 MG: 5 TABLET ORAL at 21:38

## 2020-07-13 RX ADMIN — SERTRALINE 200 MG: 100 TABLET, FILM COATED ORAL at 14:38

## 2020-07-13 RX ADMIN — SPIRONOLACTONE 12.5 MG: 25 TABLET ORAL at 14:39

## 2020-07-13 RX ADMIN — ASPIRIN 81 MG: 81 TABLET, COATED ORAL at 14:38

## 2020-07-13 RX ADMIN — LISINOPRIL 5 MG: 5 TABLET ORAL at 14:39

## 2020-07-13 RX ADMIN — CETIRIZINE HYDROCHLORIDE 10 MG: 10 TABLET ORAL at 14:39

## 2020-07-13 RX ADMIN — ZOLPIDEM TARTRATE 10 MG: 5 TABLET ORAL at 21:38

## 2020-07-13 ASSESSMENT — SLEEP AND FATIGUE QUESTIONNAIRES
SLEEP PATTERN: DIFFICULTY FALLING ASLEEP
DO YOU USE A SLEEP AID: YES
RESTFUL SLEEP: NO
DO YOU HAVE DIFFICULTY SLEEPING: YES
DO YOU USE A SLEEP AID: YES
DIFFICULTY STAYING ASLEEP: NO
DO YOU HAVE DIFFICULTY SLEEPING: YES
DIFFICULTY STAYING ASLEEP: NO
AVERAGE NUMBER OF SLEEP HOURS: 7
SLEEP PATTERN: DIFFICULTY FALLING ASLEEP;INSOMNIA
DIFFICULTY ARISING: NO
DIFFICULTY FALLING ASLEEP: YES
RESTFUL SLEEP: YES
DIFFICULTY FALLING ASLEEP: YES
DIFFICULTY ARISING: NO

## 2020-07-13 ASSESSMENT — PAIN SCALES - GENERAL
PAINLEVEL_OUTOF10: 0

## 2020-07-13 ASSESSMENT — PATIENT HEALTH QUESTIONNAIRE - PHQ9: SUM OF ALL RESPONSES TO PHQ QUESTIONS 1-9: 18

## 2020-07-13 ASSESSMENT — LIFESTYLE VARIABLES
HISTORY_ALCOHOL_USE: YES
HISTORY_ALCOHOL_USE: NO

## 2020-07-13 NOTE — PLAN OF CARE
[address] [place] [address]  Initial Interdisciplinary Treatment Plan NOTE    Review Date & Time: 7/13/20 1106    Patient was not in treatment team    Admission Type:   Admission Type:  Involuntary    Reason for admission:  Reason for Admission: Depression      Estimated Length of Stay Update:  3-5 days  Estimated Discharge Date Update: 716/20-7/18/20    PATIENT STRENGTHS:  Patient Strengths Strengths: Communication, Positive Support, Social Skills, Motivated  Patient Strengths and Limitations:Limitations: Difficult relationships / poor social skills, Tendency to isolate self  Addictive Behavior:Addictive Behavior  In the past 3 months, have you felt or has someone told you that you have a problem with:  : None  Do you have a history of Chemical Use?: No  Do you have a history of Alcohol Use?: Yes(Pt drank in the late 1980's.)  Do you have a history of Street Drug Abuse?: No  Histroy of Prescripton Drug Abuse?: No  Medical Problems:  Past Medical History:   Diagnosis Date    ADHD (attention deficit hyperactivity disorder)     Anesthesia     slow to wake when she was on diazepam    Chronic back pain     Deafness in right ear 1997    Depression     GERD (gastroesophageal reflux disease)     Hyperlipidemia     Hypertension     resolved    Hypothyroidism     Meniere disorder     Morbid obesity ([address]     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    Panic disorder without agoraphobia     Rash     follow be dermatologist, skin intact    Seizure ([address]     Toxemia    Sleep apnea     cpap    Type 2 diabetes, uncontrolled, 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 ([address]     left eye    Vertigo        EDUCATION:   Learner Progress Toward Treatment Goals: Reviewed results and recommendations of this team    Method: Individual    Outcome: Verbalized understanding    PATIENT GOALS: none expressed    PLAN/TREATMENT RECOMMENDATIONS UPDATE:evaluate and treat    GOALS UPDATE:   Time frame for Short-Term Goals: 2 christian Garcia, RN

## 2020-07-13 NOTE — PROGRESS NOTES
Patient was provided with a mask to utilize on unit. Patient verbalized understanding of necessity for wearing mask. Also staff will continue to utilize masks when interacting with patient.

## 2020-07-13 NOTE — PLAN OF CARE
Pt. Is alert and oriented, anxious and depressed, good appetite, isolative to her bedroom, compliant with care, moves about independently, denies suicide ideation.

## 2020-07-13 NOTE — GROUP NOTE
Group Therapy Note    Date: 7/13/2020    Group Start Time: 9840  Group End Time: 6179  Group Topic: Psychoeducation    [address]        Group Therapy Note    Attendees: 3    Patient's Goal: to identify healthy coping skills to engage in, to reduce stress, improve mood, and promote use of problem solving skills. Notes: Lian Houston identified multiple healthy coping skills, such as; praying, spending time with family, sewing, spending time in nature, reading, etc. Lian Houston voiced understanding of education topic. Lian Houston achieved group goal. Lian Houston was provided with a resource list of healthy coping skills to utilize at her leisure. Status After Intervention:  Improved    Participation Level:  Active Listener and Interactive    Participation Quality: Appropriate, Attentive and Sharing      Speech:  normal      Thought Process/Content: Logical      Affective Functioning: Congruent      Mood: depressed      Level of consciousness:  Alert, Oriented x4 and Attentive      Response to Learning: Able to verbalize current knowledge/experience, Able to verbalize/acknowledge new learning and Progressing to goal      Endings: None Reported    Modes of Intervention: Education, Support, Socialization, Exploration, Clarifying, Problem-solving and Activity      Discipline Responsible: Psychoeducational Specialist      Signature:  [facility] [place]

## 2020-07-13 NOTE — BH NOTE
4 Eyes Skin Assessment     The patient is being assess for  Admission    I agree that 2 RN's have performed a thorough Head to Toe Skin Assessment on the patient. ALL assessment sites listed below have been assessed. Areas assessed by both nurses:   [x]   Head, Face, and Ears   [x]   Shoulders, Back, and Chest  [x]   Arms, Elbows, and Hands   [x]   Coccyx, Sacrum, and Ischum  [x]   Legs, Feet, and Heels        Does the Patient have Skin Breakdown? Yes a wound was noted on the Admission Assessment and an WOUND LDA was Initiated documentation include the Marah-wound, Wound Assessment, Measurements, Dressing Treatment, Drainage, and Color\", Pt has multiple scabs on her abdomen, back and legs. She states some of them are non healing and will require an antibiotic ointment. She has redness in her lower abdominal fold. She was noted to have pieces of paper towel in the fold to absorb sweat. Her left 2nd toe is red and slightly edematous from dropping something on it. She states it is painful.        Brendan Prevention initiated:  Yes   Wound Care Orders initiated:  No      [facility] nurse consulted for Pressure Injury (Stage 3,4, Unstageable, DTI, NWPT, and Complex wounds):  No      Nurse 1 eSignature: Electronically signed by Monique Dawson RN on 7/13/20 at 5:50 AM EDT    **SHARE this note so that the co-signing nurse is able to place an eSignature**    Nurse 2 eSignature: Electronically signed by Roberta Holguin RN on 7/13/20 at 6:14 AM EDT
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
## 2020-07-13 NOTE — PROGRESS NOTES
Nutrition Assessment     Type and Reason for Visit: Initial, Positive Nutrition Screen(+ screen for poor po intake, wounds, and MST = 2)    Nutrition Recommendations/Plan:   1. Continue CCC-4, low-Na diet order. 2. Monitor appetite and po intake. 3. Monitor weight trends and bowel function. Nutrition Assessment:  patient is adequately nourished AEB stable weight (+ weight gain, actually) and adequate po intake and she is not at risk for nutritional compromise at this time; will continue CCC-4, low-Na diet order and monitor nutrition status     Malnutrition Status: No malnutrition      Estimated Daily Nutrient Needs:  Energy (kcal): 1190 - 1428 kcals based on 10-12 kcals/kg/CBW; Weight Used for Energy Requirements:  Current     Protein (g): 76 - 88 g protein based on 1.2-1.4 g/kg/IBW;  Weight Used for Protein Requirements:  Ideal        Fluid (ml/day): 1100 - 1400 ml; Weight Used for Fluid Requirements:  Current      Nutrition Related Findings: patient is A & O x 4; + good appetite - she has consumed % x 2 meals thus far during her admission; patient has a hx of a panic d/o without agoraphobia; + recent life stressors that have contributed to her mental health decline/SI; patient has a hx of depression, GERD, HLD, HTN, hypothyroidism, Meniere d/o, GEOFF, and DM II; BS were very elevated today (314, 441, 305 mg/dl today)      Current Nutrition Therapies:    DIET CARB CONTROL; Low Sodium (2 GM)    Anthropometric Measures:  · Height: 5' 7.5\" (171.5 cm)  · Current Body Wt: 262 lb 11 oz (119.2 kg)   · BMI: 40.5    Nutrition Diagnosis:   · Altered nutrition-related lab values related to endocrine dysfuntion as evidenced by lab values      Nutrition Interventions:   Food and/or Nutrient Delivery:  Continue Current Diet  Nutrition Education/Counseling:  No recommendation at this time   Coordination of Nutrition Care:  No recommendation at this time    Goals:  patient will consume > 75% of her meals on CCC-4, low-Na diet order x 3 meals per day without BS > 180 mg/dl       Nutrition Monitoring and Evaluation:   Behavioral-Environmental Outcomes:  Readiness for Change, Beliefs and Attitutes   Food/Nutrient Intake Outcomes:  Food and Nutrient Intake  Physical Signs/Symptoms Outcomes:  Weight     Discharge Planning:    No discharge needs at this time     Electronically signed by Jonathan Pozo RD, LD on 7/13/20 at 3:28 PM EDT    Contact: [phone]

## 2020-07-13 NOTE — H&P
Hospital Medicine History & Physical      PCP: Ernesto Wall MD    Date of Admission: 2020    Date of Service: Pt seen/examined on 2020     Chief Complaint:  No chief complaint on file. History Of Present Illness: The patient is a 60 y.o. female with a PMH of Depression, GERD, HLD, HTN, Hypothyroidism, Meniere Disorder, GEOFF, and type II DM who presented to [facility] for worsening depression. Patient was seen and evaluated in the ED by the ED medical provider, patient was medically cleared for admission to [facility] at [facility]. This note serves as an admission medical H&P.   PCP: Ernesto Wall MD  Tobacco Use: denies  EtOH Use: rarely  Illicit Drug Use: denies    Pt denies any medical concerns at this time. Past Medical History:        Diagnosis Date    ADHD (attention deficit hyperactivity disorder)     Anesthesia     slow to wake when she was on diazepam    Chronic back pain     Deafness in right ear     Depression     GERD (gastroesophageal reflux disease)     Hyperlipidemia     Hypertension     resolved    Hypothyroidism     Meniere disorder     Morbid obesity ([address]     Neovascular glaucoma 2019    Panic disorder without agoraphobia     Rash     follow be dermatologist, skin intact    Seizure ([address]     Toxemia    Sleep apnea     cpap    Type 2 diabetes, uncontrolled, 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 (HCC)     left eye    Vertigo        Past Surgical History:        Procedure Laterality Date    ABDOMEN SURGERY  11    LAPAROSCOPIC ADJUSTABLE GASTRIC BANDING    CARDIAC CATHETERIZATION       SECTION      GLAUCOMA SURGERY Left 2019    Ahmed valve    LAP BAND  2010    THYROIDECTOMY      UPPER GASTROINTESTINAL ENDOSCOPY  13       Medications Prior to Admission:    Prior to Admission medications    Medication Sig Start Date End Date Taking?  Authorizing Provider   diazePAM (VALIUM) 5 MG tablet Take 1 tablet by mouth daily ipratropium (ATROVENT) 0.06 % nasal spray 1 spray by Nasal route 3 times daily for 4 days 3/11/20 3/15/20  Amber Loving MD   levocetirizine (XYZAL) 5 MG tablet Take 1 tablet by mouth nightly  Patient not taking: Reported on 7/13/2020 2/17/20   Amber Loving MD   nystatin (MYCOSTATIN) 943194 UNIT/GM cream Apply topically 10/15/13   Historical Provider, MD   aspirin 81 MG tablet Take 81 mg by mouth    Historical Provider, MD       Allergies:  Dulaglutide and Exenatide    Social History:  The patient currently lives with daughter. TOBACCO:   reports that she has never smoked. She has never used smokeless tobacco.  ETOH:   reports previous alcohol use. Family History:   Positive as follows:        Problem Relation Age of Onset    Diabetes Mother     Cancer Mother     Obesity Mother     Diabetes Father     Cancer Father     Obesity Father     Other Father         malignant hyperthermia    Heart Disease Brother     Diabetes Brother     Heart Surgery Brother     Obesity Brother     Heart Disease Brother     Heart Surgery Brother     Obesity Brother     Diabetes Sister     Other Other         daughter  with malignant hyperthermia       REVIEW OF SYSTEMS:     Constitutional: Negative for fever   HENT: Negative for sore throat   Eyes: Negative for redness   Respiratory: Negative  for dyspnea, cough   Cardiovascular: Negative for chest pain   Gastrointestinal: Negative for vomiting, diarrhea   Genitourinary: Negative for hematuria   Musculoskeletal: Negative for arthralgias   Skin: Negative for rash   Neurological: Negative for syncope   Hematological: Negative for adenopathy   Psychiatric/Behavorial: Negative for anxiety    PHYSICAL EXAM:    /75   Pulse 103   Temp 97 °F (36.1 °C) (Oral)   Resp 17   Wt 262 lb 11.2 oz (119.2 kg)   LMP 08/01/2012   SpO2 96%   BMI 40.54 kg/m²   Gen: No distress. Alert. Pleasant  female, morbidly obese  Eyes: PERRL. No sclera icterus.  No conjunctival injection. ENT: No discharge. Pharynx clear. Neck:  Trachea midline. Resp: No accessory muscle use. No crackles. No wheezes. No rhonchi. On RA  CV: Regular rate. Regular rhythm. No murmur. No rub. No edema. GI: Soft, obese, Non-tender. Non-distended. Normal bowel sounds. Skin: Warm and dry. No rash on exposed extremities. M/S: No cyanosis. No clubbing. Good UE and LE strength bilaterally - 5/5  Neuro: Awake. Grossly nonfocal, CN II-XII intact, ambulates w/o assistance  Psych: Oriented x 3. Defer to psychiatry.     U/A:    Lab Results   Component Value Date    COLORU Yellow 08/29/2011    WBCUA 10-20 08/29/2011    RBCUA 5-10 08/29/2011    MUCUS Present 08/29/2011    BACTERIA Few 08/29/2011    CLARITYU Clear 08/29/2011    SPECGRAV 1.015 08/29/2011    LEUKOCYTESUR Moderate 08/29/2011    BLOODU Negative 08/29/2011    GLUCOSEU Negative 08/29/2011       CULTURES  SARS-CoV-2: not detected    EKG:  I have reviewed the EKG with the following interpretation:   7/13/2020  Sinus tachycardia rate of 108  Low voltage QRS  Cannot rule out Inferior infarct , age undetermined  Cannot rule out Anteroseptal infarct (cited on or before 22-FEB-2013)  Abnormal ECG  When compared with ECG of 22-FEB-2013 11:03,QRS duration has decreased  Questionable change in initial forces of Anterior leads     RADIOLOGY  None    Pertinent previous results reviewed      Health Records Reviewed 7/13/2020    - UDS + benzos and TCAs  - UA >500 BG, neg leukocytes and nitrites, neg blood  - CBC normal  - hepatic panel: normal  - BMP: Na 132, , Cr 0.88  - TSH: 4.9, T4: 1.11    ASSESSMENT/PLAN:    Depression  - cont mgmt per [facility]    Type II DM  - uncontrolled  - reports taking Levemir 25-30 units BID, Metformin 500 mg QID and Novolog 15-18 BID with meals  - will order Lantus 25 units BID, Metformin 1000 mg BID and low dose  - POC Glucose, monitor    HTN  - well controlled  - cont Lisinopril, Aldactone  - monitor    Hypothyroidism  - home dose of synthroid 175 mcg     Hx of Meniere's Disease  - low Na diet    GERD  - cont Protonix    Allergies  - cont Zyrtec    HLD  - cont Lipitor    GEOFF  - cont home BiPAP    Morbid Obesity  - Body mass index is 40.54 kg/m². - Complicating assessment and treatment. Placing patient at risk for multiple co-morbidities as well as early death and contributing to the patient's presentation.   - Counseled on weight loss. Pt has no medical complaints at this time. Pt was informed that they may have [facility] contact us should any medical concerns arise during this admission.     Yohan Esquivel PA-C 2:30 PM 7/13/2020

## 2020-07-13 NOTE — FLOWSHEET NOTE
I could not get my sugar down. It just kept getting slowly higher. \"   Perception of changes needed \"To work with the doctor and team here to create a plan to get my health under control. \"   Strengths and Limitations   Strengths Positive leisure interests;Educated;Demonstrates basic social skills; Positive support network;Primary internal locus of control   Limitations Difficult relationships / poor social skills; Tendency to isolate self     CTRS completed pt's AT/OT Leisure Assessment.     Arina Boyd, CTRS

## 2020-07-13 NOTE — PROGRESS NOTES
diabetes management  Social Network: daughters. Patient is    Stressors: back pain, not working, no daily routine, putting stress on daughter   Coping Skills: unable to identify coping strategies     Pain  No  Rating:NA  Location:  Pain Medicine Status: Denies need      Cognition    A&O x4   Able to follow 2 step commands    Subjective  Patient lying supine in bed with no family present - no visitors present due to COVID-19 restrictions  Pt agreeable to this OT eval & tx. Upper Extremity ROM:    WFL,  pt able to perform all bed mobility, transfers, and gait without ROM limitation. Upper Extremity Strength:    BUE strength WFL, but not formally assessed w/ MMT    Upper Extremity Sensation    WFL    Upper Extremity Proprioception:  WFL    Coordination and Tone  Diminished    Balance  Functional Sitting Balance:  WFL  Functional Standing Balance:Diminished    Bed mobility:    Supine to sit: Independent  Sit to supine:   Independent  Rolling:    Independent  Scooting in sitting:  Independent  Scooting to head of bed: Independent    Bridging:   Independent    Transfers:    Sit to stand:  Supervision  Stand to sit:  Supervision  Bed to chair:   Not Tested  Standard toilet: SBA  Bed to [facility]:  Not Tested    Dressing:      UE:   Supervision  LE:    SBA    Bathing:    UE:  Supervision  LE:  SBA    Eating:   Not Tested    Toileting:  SBA (patient with loose stool during OT evaluation. Unable to make it to the bathroom in time. Patient tearful and upset about incontinence)     Activity Tolerance   Pt completed therapy session with No adverse symptoms noted w/activity  SpO2:   HR:   BP:     Positioning Needs: In bed, call light and needs in reach. Exercise / Activities Initiated:   N/A    Patient/Family Education:   Role of OT  Recommendations for DC    Assessment of Deficits: Pt seen for Occupational therapy evaluation in acute care setting.   Pt demonstrated decreased Activity tolerance, ADLs, IADLs, Balance , Bathing, Bed mobility, Dressing, Strength and Coping Skills. Pt functioning below baseline and will likely benefit from skilled occupational therapy services to maximize safety and independence. Goal(s) : To be met in 3 Visits:  1. Pt. To verbalize 3 coping skills. 2. Pt to complete ACLS/MOCA. To be met in 5 Visits:  1. Pt. To complete interest checklist.    2. Pt. To verbalize understanding of sleep hygiene education. 3. Pt. To complete daily schedule of healthy activities/routines with minimal assist.  4. Pt. To complete wellness plan. 5. Pt. To complete 1 SMART long term goal and 2 SMART short term goals with minimal assist.        Rehabilitation Potential:  Good for goals listed above. Strengths for achieving goals include: Pt motivated, PLOF, Family Support and Pt cooperative  Barriers to achieving goals include:  Complexity of condition     Plan: To be seen 2-5 x/wk while in acute care setting for therapeutic exercises, bed mobility, transfers, dressing, bathing, family/patient education, ADL/IADL retraining, energy conservation training.        Shaji Mcghee OTR/L #[id number]    If patient discharges from this facility prior to next visit, this note will serve as the Discharge Summary

## 2020-07-14 LAB
GLUCOSE BLD-MCNC: 312 MG/DL (ref 70–99)
GLUCOSE BLD-MCNC: 315 MG/DL (ref 70–99)
GLUCOSE BLD-MCNC: 357 MG/DL (ref 70–99)
GLUCOSE BLD-MCNC: 394 MG/DL (ref 70–99)
PERFORMED ON: ABNORMAL

## 2020-07-14 PROCEDURE — 94761 N-INVAS EAR/PLS OXIMETRY MLT: CPT

## 2020-07-14 PROCEDURE — 97530 THERAPEUTIC ACTIVITIES: CPT

## 2020-07-14 PROCEDURE — 6370000000 HC RX 637 (ALT 250 FOR IP): Performed by: PSYCHIATRY & NEUROLOGY

## 2020-07-14 PROCEDURE — 6370000000 HC RX 637 (ALT 250 FOR IP): Performed by: PHYSICIAN ASSISTANT

## 2020-07-14 PROCEDURE — 99232 SBSQ HOSP IP/OBS MODERATE 35: CPT | Performed by: PHYSICIAN ASSISTANT

## 2020-07-14 PROCEDURE — 1240000000 HC EMOTIONAL WELLNESS R&B

## 2020-07-14 PROCEDURE — 99232 SBSQ HOSP IP/OBS MODERATE 35: CPT | Performed by: PSYCHIATRY & NEUROLOGY

## 2020-07-14 RX ADMIN — BUSPIRONE HYDROCHLORIDE 5 MG: 5 TABLET ORAL at 09:16

## 2020-07-14 RX ADMIN — ZOLPIDEM TARTRATE 10 MG: 5 TABLET ORAL at 23:09

## 2020-07-14 RX ADMIN — BUSPIRONE HYDROCHLORIDE 5 MG: 5 TABLET ORAL at 15:12

## 2020-07-14 RX ADMIN — INSULIN GLARGINE 25 UNITS: 100 INJECTION, SOLUTION SUBCUTANEOUS at 09:13

## 2020-07-14 RX ADMIN — LORAZEPAM 0.5 MG: 0.5 TABLET ORAL at 20:19

## 2020-07-14 RX ADMIN — ATORVASTATIN CALCIUM 10 MG: 10 TABLET, FILM COATED ORAL at 09:16

## 2020-07-14 RX ADMIN — ASPIRIN 81 MG: 81 TABLET, COATED ORAL at 09:16

## 2020-07-14 RX ADMIN — METFORMIN HYDROCHLORIDE 1000 MG: 500 TABLET ORAL at 09:16

## 2020-07-14 RX ADMIN — LISINOPRIL 5 MG: 5 TABLET ORAL at 09:42

## 2020-07-14 RX ADMIN — METFORMIN HYDROCHLORIDE 1000 MG: 500 TABLET ORAL at 17:58

## 2020-07-14 RX ADMIN — LEVOTHYROXINE SODIUM 175 MCG: 150 TABLET ORAL at 06:46

## 2020-07-14 RX ADMIN — PANTOPRAZOLE SODIUM 40 MG: 40 TABLET, DELAYED RELEASE ORAL at 09:16

## 2020-07-14 RX ADMIN — INSULIN GLARGINE 25 UNITS: 100 INJECTION, SOLUTION SUBCUTANEOUS at 20:19

## 2020-07-14 RX ADMIN — INSULIN LISPRO 4 UNITS: 100 INJECTION, SOLUTION INTRAVENOUS; SUBCUTANEOUS at 20:21

## 2020-07-14 RX ADMIN — SPIRONOLACTONE 12.5 MG: 25 TABLET ORAL at 09:16

## 2020-07-14 RX ADMIN — CETIRIZINE HYDROCHLORIDE 10 MG: 10 TABLET ORAL at 09:16

## 2020-07-14 RX ADMIN — MUPIROCIN: 20 OINTMENT TOPICAL at 09:15

## 2020-07-14 RX ADMIN — BUSPIRONE HYDROCHLORIDE 5 MG: 5 TABLET ORAL at 20:18

## 2020-07-14 RX ADMIN — SERTRALINE 200 MG: 100 TABLET, FILM COATED ORAL at 09:16

## 2020-07-14 ASSESSMENT — PAIN SCALES - GENERAL
PAINLEVEL_OUTOF10: 0

## 2020-07-14 NOTE — PROGRESS NOTES
Pt is alert and oriented x4 but c/o being forgetful at times. Continues to be anxious and depressed, worried about medical issues. Out in dayroom more today. Social w/ select peer. GG=[phone], [phone], [phone]. Insulin coverage given. Med compliant. Independent gait. Denies SI/HI/AVH but stated she saw her sister out in the dayroom talking to a \"short woman who is a staff member\". She became very scared and anxious b/c her sister is \"dangerous\" and [place]" her children. Pt was reassured that her sister was not on the unit and was redirected. Pt was able to be reassured at this time. Pt is currently sitting in her room calmly talking to her daughter. Will continue to monitor.

## 2020-07-14 NOTE — PROGRESS NOTES
Inpatient Occupational Therapy Treatment Note    Unit:  [facility]  Date:  7/14/2020  Patient Name:    Wayne Sanchez  Admitting diagnosis:  Depression, unspecified depression type [F32.9]  Admit Date:  7/13/2020  Precautions/Restrictions/WB Status/ Lines/ Wounds/ Oxygen:  Standard BHI Precautions  History of Present Illness:  60 y. o. female with a PMH of Depression, GERD, HLD, HTN, Hypothyroidism, Meniere Disorder, GEOFF, and type II DM who presented to [facility] for worsening depression. Treatment Number:  2    Treatment Time: 6622-1015  Timed Code Treatment Minutes:  41   minutes   Total Treatment Time:    41  minutes    Staff Recommendations:  Assist of 1 with use of No AD for all ambulation within community room     Discharge Recommendations:  Home with PRN assist and Home OT    DME needs for discharge:  Shower Chair    AM-PAC Score: AM-PAC Inpatient Daily Activity Raw Score: 19  Home Health S4 Level: Level 1- Standard     MOCA:  TBA    Subjective:  Pt was found seated in her room today, was receptive to OT tx. ADLs:  Leisure Interests:  Interest checklist performed. Pt was able find 31 activities she enjoys performing, and 6 new activities she would like to try. One of these activities was writing, and she has paper to try later today. Coping Skills: Discussed using some of the above activities as coping strategies. Recommended pt use list to find a daily activity to perform. Pain   Yes  Rating:3  Location: low back and buttock  Pain Medicine Status: No request made      Cognition    A&O to orientation not directly assessed.    Able to follow:  2 step commands    Balance:NT    Bed mobility:  NT    Transfer Training:   Sit to stand:   Not Tested  Stand to sit:  Not Tested  Bed to Chair:  Not Tested  Standard toilet:   Not Tested    Activity Tolerance   Pt completed therapy session with No adverse symptoms noted w/activity    Therapeutic Exercise:     Patient Education:   Role of OT, Coping skills, Recommendations for DC and Interest checklist took place    Positioning Needs: In room with needs met    Family Present:  No    Assessment: Pt tolerated OT tx session well today. Pt verbalized worry re: her health (various issues), stating it is difficult for her to get out to the various appointments with which she needs to follow up. GOALS  To be met in 3 Visits:  1. Pt. To verbalize 3 coping skills. 2. Pt to complete ACLS/MOCA.      To be met in 5 Visits:  1. Pt. To complete interest checklist.  (Goal met 7/14/20)  2. Pt. To verbalize understanding of sleep hygiene education. 3. Pt. To complete daily schedule of healthy activities/routines with minimal assist.  4. Pt. To complete wellness plan. 5. Pt.  To complete 1 SMART long term goal and 2 SMART short term goals with minimal assist.         Plan: cont with [facility] MS, OTR/L  #[id number]        If patient discharges from this facility prior to next visit, this note will serve as the Discharge Summary

## 2020-07-14 NOTE — PROGRESS NOTES
Progress Note    Admit Date:  7/13/2020    Subjective:  Ms. Domingo Walker denies any concerns. BG is running high. She did express interest in talking with a dietitian about her diabetic diet. Objective:   BP (!) 138/109   Pulse 96   Temp 96.6 °F (35.9 °C) (Oral)   Resp 18   Ht 5' 7.5\" (1.715 m)   Wt 262 lb 11.2 oz (119.2 kg)   LMP 08/01/2012   SpO2 98%   BMI 40.54 kg/m²       No intake or output data in the 24 hours ending 07/14/20 1427    Physical Exam:  Gen: No distress. Alert. Pleasant  female, morbidly obese  Eyes: PERRL. No sclera icterus. No conjunctival injection. ENT: No discharge. Pharynx clear. Neck:  Trachea midline. Resp: No accessory muscle use. No crackles. No wheezes. No rhonchi. On RA  CV: Regular rate. Regular rhythm. No murmur. No rub. No edema. GI: Soft, obese, Non-tender. Non-distended. Normal bowel sounds. Skin: Warm and dry. No rash on exposed extremities. M/S: No cyanosis. No clubbing. Good UE and LE strength bilaterally - 5/5  Neuro: Awake. Grossly nonfocal, CN II-XII intact, ambulates w/o assistance  Psych: Oriented x 3. Defer to psychiatry.     Scheduled Meds:   insulin lispro  10 Units Subcutaneous TID WC    mupirocin   Topical Daily    insulin lispro  0-6 Units Subcutaneous TID WC    insulin lispro  0-3 Units Subcutaneous Nightly    aspirin  81 mg Oral Daily    cetirizine  10 mg Oral Daily    lisinopril  5 mg Oral Daily    pantoprazole  40 mg Oral Daily    sertraline  200 mg Oral Daily    atorvastatin  10 mg Oral Daily    spironolactone  12.5 mg Oral Daily    levothyroxine  175 mcg Oral Daily    busPIRone  5 mg Oral TID    insulin glargine  25 Units Subcutaneous BID    metFORMIN  1,000 mg Oral BID WC       Continuous Infusions:   dextrose         PRN Meds:  acetaminophen, LORazepam **OR** LORazepam, haloperidol lactate **OR** haloperidol, traZODone, benztropine mesylate, magnesium hydroxide, aluminum & magnesium hydroxide-simethicone, glucose, dextrose, glucagon (rDNA), dextrose, cyclobenzaprine, zolpidem    CULTURES    SARS-CoV-2: not detected     RADIOLOGY  none    Assessment/Plan:    Depression  - cont mgmt per BHI     Type II DM  - uncontrolled  - reports taking Levemir 25-30 units BID, Metformin 500 mg QID and Novolog 15-18 BID with meals  - cont Lantus 25 units BID, Metformin 1000 mg BID & low dose -> med dose SSI, add Humalog 10 units TID with meals  - POC Glucose, monitor  - dietitian consult     HTN  - well controlled  - cont Lisinopril, Aldactone  - monitor     Hypothyroidism  - home dose of synthroid 175 mcg      Hx of Meniere's Disease  - low Na diet     GERD  - cont Protonix     Allergies  - cont Zyrtec     HLD  - cont Lipitor     GEOFF  - cont home BiPAP     Morbid Obesity  - Body mass index is 40.54 kg/m². - Complicating assessment and treatment. Placing patient at risk for multiple co-morbidities as well as early death and contributing to the patient's presentation.   - Counseled on weight loss.      Muriel Land PA-C 2:30 PM 7/14/2020

## 2020-07-14 NOTE — PLAN OF CARE
Pt was up and ambulated to the common area with a steady gait. She took a shower per self. She remains depressed and worried. She states she is still hopeful that she will improve during her stay. She requested Ambian at [facility] and it was administered per PRN order. It has been effective. She is asleep at this time. She denies SI/HI/AVH.

## 2020-07-14 NOTE — PROGRESS NOTES
Department of Psychiatry  Attending Progress Note    Admission Date:    7/13/2020    Chief complaint / Reason for Admission:  Suicidal ideation    Patient's chart was reviewed, case was discussed with nursing/OT/RT staff, and collaborated with  about the treatment plan. SUBJECTIVE:   Over last 24 hours:  Behavioral outbursts: No   Non-aggressive behavioral disturbance: No  Medication compliant: Yes  Need for seclusion/restraints: No  Sleeping adequately:  Yes  Appetite adequate: Yes  Attending groups: Yes    Patient attended groups and was cooperative with medications and care. She slept well, though she did need her PRN zolpidem. She states that she is feeling \"a lot better today\" and was brighter speaking with a stronger voice, and better engagement. She states that she feels better having had a good nights sleep, and also feels as though she is getting some clarity about her diabetes management. Increasingly clear that this is a big part of her feeling so overwhelmed. She did ask about our completing outpatient testing that had been ordered by her PCP: a mammogram and a lumbar spine MRI. Explained to her that we would not be able to do that during this admission.     Remains interested in outpatient psychiatry referral.    Progressing overall: Improving  Suicidal ideation: Denies  Homicidal ideation: Denies  Medication side effects: No    ROS: Patient has new complaints: no    Current Medications Ordered:   insulin lispro  10 Units Subcutaneous TID WC    insulin lispro  0-12 Units Subcutaneous TID WC    insulin lispro  0-6 Units Subcutaneous Nightly    mupirocin   Topical Daily    aspirin  81 mg Oral Daily    cetirizine  10 mg Oral Daily    lisinopril  5 mg Oral Daily    pantoprazole  40 mg Oral Daily    sertraline  200 mg Oral Daily    atorvastatin  10 mg Oral Daily    spironolactone  12.5 mg Oral Daily    levothyroxine  175 mcg Oral Daily    busPIRone  5 mg Oral TID   [facility] insulin glargine  25 Units Subcutaneous BID    metFORMIN  1,000 mg Oral BID WC      PRN Meds: acetaminophen, LORazepam **OR** LORazepam, haloperidol lactate **OR** haloperidol, traZODone, benztropine mesylate, magnesium hydroxide, aluminum & magnesium hydroxide-simethicone, glucose, dextrose, glucagon (rDNA), dextrose, cyclobenzaprine, zolpidem     Objective:     PE:    BP (!) 138/109   Pulse 96   Temp 96.6 °F (35.9 °C) (Oral)   Resp 18   Ht 5' 7.5\" (1.715 m)   Wt 262 lb 11.2 oz (119.2 kg)   LMP 08/01/2012   SpO2 98%   BMI 40.54 kg/m²         Mental Status Examination:    Appearance: AF, appears stated age, wearing pajamas, good grooming and hygiene  Behavior/Attitude toward examiner:  cooperative, attentive, improved eye contact  Speech:  spontaneous, normal rate, improved volume and well articulated   Mood:  \"Better\"  Affect:  Mood congruent   Thought processes:  Linear, goal directed  Thought Content: Chronic SI, denies active intent, denies HI, no delusions voiced, no obsessions, +anxious ruminations - less intense today  Perceptions: Denies AVH, not RTIS  Attention: intact  Abstraction: WNL  Cognition: Average LUDWIG, Alert and oriented to person, place, time, and situation, recall intact  Insight: Fair insight   Judgment: Fair judgment      LAB: Reviewed labs from last 24 hours      Assessment and Plan:     Diagnoses:   Primary Psychiatric (DSM V) Diagnosis: Major depressive disorder, recurrent, severe without psychotic features  Secondary Psychiatric (DSM V) Diagnoses: Panic disorder without agoraphobia  Chemical Dependency Diagnoses: None  Active Medical Diagnoses:        Patient Active Problem List     Diagnosis Date Noted    Uncontrolled type 2 diabetes mellitus with hyperglycemia ([facility] [address]      Depression 07/12/2020    Panic disorder without agoraphobia      Attention deficit hyperactivity disorder (ADHD), predominantly inattentive type 05/17/2017    Nausea and vomiting 03/11/2013    Gastric banding status 12/12/2012    Morbid obesity (HCC)      Sleep apnea      Chronic back pain      Meniere disorder      Meniere disease 08/29/2011    GEOFF (obstructive sleep apnea) 08/29/2011    Hypertension      Hyperlipidemia      Obesity      Type 2 diabetes, uncontrolled, 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      Tx plan: Generally: prevent self injury/aggression, stabilize mood/anxiety/psychotic/behavioral disturbance, establish/maintain aftercare, increase coping mechanisms, improve medication compliance.  All conditions present on admission are being treated while pt is hospitalized.      Legal Status: Signed voluntary 7/13.     Primary Psychiatric Issues:  1. MDD; Panic disorder:  Patient's symptoms are chronic and have not been addressed with formal treatment. Most critically she will benefit from connection to outpatient psychiatric services.  -Continue sertraline 200 mg daily. -D/C 'd abilify on admission given pt's severe, uncontrolled DM.  -Continue augmentation with buspirone 5 mg TID.     Chemical Dependency Issues:  No issues     Function:  No acute functional impairement  -Falls precautions     Medical Problems:  Internal medicine has been consulted. Appreciate recs.     Code Status: Full     Disposition:    -Housing: With family  -Current outpatient follow-up: Needs connection     Estimated length of stay: 3 to 5 day        Criteria for Discharge:  Not psychotic, not homicidal, not suicidal, behavioral disturbance under control, sleeping well, mood improved/stable, eating well, aftercare arranged. Total face to face time with patient was 30 minutes and more than 50 % of that time was spent counseling the patient on their symptoms, treatment and expected goals.     Vel Jones MD  Staff Psychiatrist

## 2020-07-14 NOTE — GROUP NOTE
Group Therapy Note    Date: 7/14/2020    Group Start Time: 8157  Group End Time: 2882  Group Topic: Psychoeducation    [address]        Group Therapy Note    Attendees: 3    Patient's Goal: to read and discuss the article Recognizing and Managing Emotions. To practice recognizing emotions by making an abstract marble painting with paint colors representing current emotions, pt identified feeling during group time. Notes: Ronak Ellis discussed ways to recognize and manage emotions she experiences. Ronak Ellis created an art piece that recognized mixed emotions she is currently feeling. Ronak Ellis achieved group goal.     Status After Intervention:  Improved    Participation Level:  Active Listener and Interactive    Participation Quality: Appropriate, Attentive and Sharing      Speech:  normal      Thought Process/Content: Logical  Linear      Affective Functioning: Congruent      Mood: depressed      Level of consciousness:  Alert, Oriented x4 and Attentive      Response to Learning: Able to verbalize current knowledge/experience, Able to verbalize/acknowledge new learning and Progressing to goal      Endings: None Reported    Modes of Intervention: Education, Support, Socialization, Exploration, Clarifying, Problem-solving, Activity, Movement and Media      Discipline Responsible: Psychoeducational Specialist      Signature:  Caridad Clements, [address]

## 2020-07-14 NOTE — PROGRESS NOTES
07/13/20 4779   NIV Type   $NIV $Daily Charge   Skin Assessment Clean, dry, & intact   Equipment Type respironics   Mode CPAP   Mask Type Nasal mask   Mask Size Medium   Settings/Measurements   CPAP/EPAP 12 cmH2O   Resp 18   FiO2  21 %   Comfort Level Good   Using Accessory Muscles No   SpO2 98   Breath Sounds   Right Upper Lobe Diminished   Right Middle Lobe Diminished   Right Lower Lobe Diminished   Left Upper Lobe Diminished   Left Lower Lobe Diminished   Patient Observation   Observations SPO2  98%  on cpap room air.

## 2020-07-14 NOTE — BH NOTE
Radha Coto was excused from [address] [place] [address] due to meeting with PT/OT staff.     SANTOS Franklin

## 2020-07-15 ENCOUNTER — APPOINTMENT (OUTPATIENT)
Dept: CT IMAGING | Age: 65
DRG: 885 | End: 2020-07-15
Attending: PSYCHIATRY & NEUROLOGY
Payer: MEDICARE

## 2020-07-15 LAB
CHOLESTEROL, TOTAL: 169 MG/DL (ref 0–199)
ESTIMATED AVERAGE GLUCOSE: 263.3 MG/DL
GLUCOSE BLD-MCNC: 257 MG/DL (ref 70–99)
GLUCOSE BLD-MCNC: 259 MG/DL (ref 70–99)
GLUCOSE BLD-MCNC: 339 MG/DL (ref 70–99)
GLUCOSE BLD-MCNC: 341 MG/DL (ref 70–99)
HBA1C MFR BLD: 10.8 %
HDLC SERPL-MCNC: 38 MG/DL (ref 40–60)
LDL CHOLESTEROL CALCULATED: 101 MG/DL
PERFORMED ON: ABNORMAL
TRIGL SERPL-MCNC: 151 MG/DL (ref 0–150)
VLDLC SERPL CALC-MCNC: 30 MG/DL

## 2020-07-15 PROCEDURE — 94761 N-INVAS EAR/PLS OXIMETRY MLT: CPT

## 2020-07-15 PROCEDURE — 36415 COLL VENOUS BLD VENIPUNCTURE: CPT

## 2020-07-15 PROCEDURE — 80061 LIPID PANEL: CPT

## 2020-07-15 PROCEDURE — 1240000000 HC EMOTIONAL WELLNESS R&B

## 2020-07-15 PROCEDURE — 70450 CT HEAD/BRAIN W/O DYE: CPT

## 2020-07-15 PROCEDURE — 6370000000 HC RX 637 (ALT 250 FOR IP): Performed by: PSYCHIATRY & NEUROLOGY

## 2020-07-15 PROCEDURE — 83036 HEMOGLOBIN GLYCOSYLATED A1C: CPT

## 2020-07-15 PROCEDURE — 99232 SBSQ HOSP IP/OBS MODERATE 35: CPT | Performed by: PHYSICIAN ASSISTANT

## 2020-07-15 PROCEDURE — 99233 SBSQ HOSP IP/OBS HIGH 50: CPT | Performed by: PSYCHIATRY & NEUROLOGY

## 2020-07-15 PROCEDURE — 94660 CPAP INITIATION&MGMT: CPT

## 2020-07-15 PROCEDURE — 6370000000 HC RX 637 (ALT 250 FOR IP): Performed by: PHYSICIAN ASSISTANT

## 2020-07-15 RX ORDER — ARIPIPRAZOLE 5 MG/1
5 TABLET ORAL DAILY
Status: DISCONTINUED | OUTPATIENT
Start: 2020-07-15 | End: 2020-07-16

## 2020-07-15 RX ORDER — LORAZEPAM 1 MG/1
1 TABLET ORAL
Status: DISCONTINUED | OUTPATIENT
Start: 2020-07-15 | End: 2020-07-22

## 2020-07-15 RX ORDER — LORAZEPAM 2 MG/1
4 TABLET ORAL
Status: DISCONTINUED | OUTPATIENT
Start: 2020-07-15 | End: 2020-07-22

## 2020-07-15 RX ORDER — LORAZEPAM 2 MG/1
2 TABLET ORAL
Status: DISCONTINUED | OUTPATIENT
Start: 2020-07-15 | End: 2020-07-22

## 2020-07-15 RX ORDER — DIAZEPAM 5 MG/1
5 TABLET ORAL EVERY 6 HOURS PRN
Status: DISCONTINUED | OUTPATIENT
Start: 2020-07-15 | End: 2020-07-15

## 2020-07-15 RX ORDER — LORAZEPAM 2 MG/ML
3 INJECTION INTRAMUSCULAR
Status: DISCONTINUED | OUTPATIENT
Start: 2020-07-15 | End: 2020-07-22

## 2020-07-15 RX ORDER — INSULIN GLARGINE 100 [IU]/ML
30 INJECTION, SOLUTION SUBCUTANEOUS 2 TIMES DAILY
Status: DISCONTINUED | OUTPATIENT
Start: 2020-07-15 | End: 2020-07-16

## 2020-07-15 RX ORDER — SODIUM CHLORIDE 0.9 % (FLUSH) 0.9 %
10 SYRINGE (ML) INJECTION PRN
Status: DISCONTINUED | OUTPATIENT
Start: 2020-07-15 | End: 2020-07-29 | Stop reason: HOSPADM

## 2020-07-15 RX ORDER — LORAZEPAM 2 MG/ML
4 INJECTION INTRAMUSCULAR
Status: DISCONTINUED | OUTPATIENT
Start: 2020-07-15 | End: 2020-07-22

## 2020-07-15 RX ORDER — LORAZEPAM 2 MG/ML
2 INJECTION INTRAMUSCULAR
Status: DISCONTINUED | OUTPATIENT
Start: 2020-07-15 | End: 2020-07-22

## 2020-07-15 RX ORDER — DIAZEPAM 5 MG/1
2.5 TABLET ORAL 2 TIMES DAILY
Status: DISCONTINUED | OUTPATIENT
Start: 2020-07-15 | End: 2020-07-20

## 2020-07-15 RX ORDER — LORAZEPAM 2 MG/ML
1 INJECTION INTRAMUSCULAR
Status: DISCONTINUED | OUTPATIENT
Start: 2020-07-15 | End: 2020-07-22

## 2020-07-15 RX ADMIN — ZOLPIDEM TARTRATE 10 MG: 5 TABLET ORAL at 20:16

## 2020-07-15 RX ADMIN — DIAZEPAM 2.5 MG: 5 TABLET ORAL at 20:16

## 2020-07-15 RX ADMIN — BUSPIRONE HYDROCHLORIDE 5 MG: 5 TABLET ORAL at 08:28

## 2020-07-15 RX ADMIN — LEVOTHYROXINE SODIUM 175 MCG: 150 TABLET ORAL at 06:11

## 2020-07-15 RX ADMIN — INSULIN GLARGINE 30 UNITS: 100 INJECTION, SOLUTION SUBCUTANEOUS at 20:17

## 2020-07-15 RX ADMIN — ATORVASTATIN CALCIUM 10 MG: 10 TABLET, FILM COATED ORAL at 08:27

## 2020-07-15 RX ADMIN — ASPIRIN 81 MG: 81 TABLET, COATED ORAL at 08:28

## 2020-07-15 RX ADMIN — METFORMIN HYDROCHLORIDE 1000 MG: 500 TABLET ORAL at 08:27

## 2020-07-15 RX ADMIN — CETIRIZINE HYDROCHLORIDE 10 MG: 10 TABLET ORAL at 08:28

## 2020-07-15 RX ADMIN — SERTRALINE 200 MG: 100 TABLET, FILM COATED ORAL at 08:28

## 2020-07-15 RX ADMIN — INSULIN LISPRO 3 UNITS: 100 INJECTION, SOLUTION INTRAVENOUS; SUBCUTANEOUS at 20:17

## 2020-07-15 RX ADMIN — PANTOPRAZOLE SODIUM 40 MG: 40 TABLET, DELAYED RELEASE ORAL at 08:28

## 2020-07-15 RX ADMIN — INSULIN GLARGINE 25 UNITS: 100 INJECTION, SOLUTION SUBCUTANEOUS at 08:31

## 2020-07-15 RX ADMIN — DIAZEPAM 2.5 MG: 5 TABLET ORAL at 11:41

## 2020-07-15 RX ADMIN — MUPIROCIN: 20 OINTMENT TOPICAL at 08:51

## 2020-07-15 RX ADMIN — SPIRONOLACTONE 12.5 MG: 25 TABLET ORAL at 08:28

## 2020-07-15 RX ADMIN — ARIPIPRAZOLE 5 MG: 5 TABLET ORAL at 11:41

## 2020-07-15 RX ADMIN — LISINOPRIL 5 MG: 5 TABLET ORAL at 08:27

## 2020-07-15 RX ADMIN — METFORMIN HYDROCHLORIDE 1000 MG: 500 TABLET ORAL at 17:00

## 2020-07-15 ASSESSMENT — PAIN SCALES - GENERAL
PAINLEVEL_OUTOF10: 0
PAINLEVEL_OUTOF10: 0

## 2020-07-15 NOTE — BH NOTE
Meena Holcomb has been having difficulty sleeping despite receiving PRN Ambien @ [phone]. Mood is irritable at times. Wanted room door completely closed for example. Explained to pt that we have to check on her frequently and it's best to leave door cracked. She stated, \"that's a lie! \" Explained again that this is policy being this is a psychiatric unit and we are assessing safety. Pt didn't say much but promptly got up and shut her door completely.

## 2020-07-15 NOTE — BH NOTE
Sandie Rocha was verbally invited and encouraged to attend [employer] [employer]. Pt was resting in bed and did not rouse after multiple attempts. Pt did not attend.     SANTOS Gordon

## 2020-07-15 NOTE — PROGRESS NOTES
Progress Note    Admit Date:  7/13/2020    Subjective:  Ms. Binh Arnold denies any concerns. BG continues to run high. C/o a \"scratchy\" throat. Denies any cough, fevers or chills. Afebrile    Objective:   BP (!) 181/99   Pulse 118   Temp 97.5 °F (36.4 °C) (Oral)   Resp 17   Ht 5' 7.5\" (1.715 m)   Wt 262 lb 11.2 oz (119.2 kg)   LMP 08/01/2012   SpO2 96%   BMI 40.54 kg/m²           Intake/Output Summary (Last 24 hours) at 7/15/2020 1504  Last data filed at 7/15/2020 0743  Gross per 24 hour   Intake 240 ml   Output --   Net 240 ml       Physical Exam:  Gen: No distress. Alert. Pleasant  female, morbidly obese  Eyes: PERRL. No sclera icterus. No conjunctival injection. ENT: No discharge. Pharynx clear w/o erythema or exudates  Neck:  Trachea midline. Resp: No accessory muscle use. No crackles. No wheezes. No rhonchi. On RA  CV: Regular rate. Regular rhythm. No murmur. No rub. No edema. GI: Soft, obese, Non-tender. Non-distended. Normal bowel sounds. Skin: Warm and dry. No rash on exposed extremities. M/S: No cyanosis. No clubbing. Good UE and LE strength bilaterally - 5/5  Neuro: Awake. Grossly nonfocal, CN II-XII intact, ambulates w/o assistance  Psych: Oriented x 3. Defer to psychiatry.     Scheduled Meds:   ARIPiprazole  5 mg Oral Daily    diazePAM  2.5 mg Oral BID    insulin lispro  10 Units Subcutaneous TID WC    insulin lispro  0-12 Units Subcutaneous TID WC    insulin lispro  0-6 Units Subcutaneous Nightly    mupirocin   Topical Daily    aspirin  81 mg Oral Daily    cetirizine  10 mg Oral Daily    lisinopril  5 mg Oral Daily    pantoprazole  40 mg Oral Daily    sertraline  200 mg Oral Daily    atorvastatin  10 mg Oral Daily    spironolactone  12.5 mg Oral Daily    levothyroxine  175 mcg Oral Daily    insulin glargine  25 Units Subcutaneous BID    metFORMIN  1,000 mg Oral BID WC       Continuous Infusions:   dextrose         PRN Meds:  sodium chloride flush, LORazepam **OR** LORazepam **OR** LORazepam **OR** LORazepam **OR** LORazepam **OR** LORazepam **OR** LORazepam **OR** LORazepam, acetaminophen, haloperidol lactate **OR** haloperidol, traZODone, benztropine mesylate, magnesium hydroxide, aluminum & magnesium hydroxide-simethicone, glucose, dextrose, glucagon (rDNA), dextrose, cyclobenzaprine, zolpidem    CULTURES    SARS-CoV-2: not detected     RADIOLOGY  none    Assessment/Plan:    Depression  - cont mgmt per BHI    Sore Throat  - add PRN Phenol spray     Type II DM  - uncontrolled, Hgb A1c: 10.8  - reports taking Levemir 25-30 units BID, Metformin 500 mg QID and Novolog 15-18 BID with meals  - increase Lantus to 30 units BID, cont Metformin 1000 mg, increase Humalog to 15 units TID with meals, cont med dose SSI  - POC Glucose, carb control diet  - dietitian consulted     HTN  - well controlled  - cont Lisinopril, Aldactone  - monitor     Hypothyroidism  - home dose of synthroid 175 mcg      Hx of Meniere's Disease  - low Na diet     GERD  - cont Protonix     Allergies  - cont Zyrtec     HLD  - cont Lipitor     GEOFF  - cont home BiPAP     Morbid Obesity  - Body mass index is 40.54 kg/m². - Complicating assessment and treatment. Placing patient at risk for multiple co-morbidities as well as early death and contributing to the patient's presentation.   - Counseled on weight loss.      Sophia Mcdaniel PA-C 3:04 PM 7/15/2020

## 2020-07-15 NOTE — PLAN OF CARE
[facility] has been visible on the unit. Social with select peers. Pleasant on approach but with blunted affect. Mood anxious and sad. Worried about her diabetes. Plans to follow up with diabetic educator. Planning on discharge tomorrow. States feels ready. Denies SI/HI/AVH. Does seem somewhat fearful and possibly suspicious of medication. Was given 0.5 mg PRN PO Ativan for anxiety and a pulse of 117. Somewhat reluctant to take it. FSBS = 315 tonight. Lantus and SSI provided. Alert and oriented x 4. No agitation. Walking with a steady gait. Spoke with her daughter on the telephone. Denies needs. Will continue to monitor.

## 2020-07-15 NOTE — PROGRESS NOTES
Department of Psychiatry  Attending Progress Note    Admission Date:    7/13/2020    Chief complaint / Reason for Admission:  Suicidal ideation    Patient's chart was reviewed, case was discussed with nursing/OT/RT staff, and collaborated with  about the treatment plan. SUBJECTIVE:   Over last 24 hours:  Behavioral outbursts: No   Non-aggressive behavioral disturbance: Yes, notable irritability, as well as suspiciousness, and VH  Medication compliant: Yes, but with reluctance  Need for seclusion/restraints: No  Sleeping adequately:  No - 1 hour  Appetite adequate: Yes  Attending groups: Yes    Pt has had a change in presentation over the past 24 hours. Yesterday afternoon, she told staff that her sister was on the unit walking around with one of the other staff members. She also was notably irritable and suspicious in the evening, reluctant to take her medications, and yelling at staff about doing their routine checks rather than closing her door all the way. Slept poorly in spite of ambien, only 1 hour. This morning she was notably more loose and difficult to follow in her thought process. Rather inattentive. Did state that she believes that her sister came onto unit, wearing a disguise. Also talked about there being noise on the unit from [employer] party or construction\" last night. Today she reported that she had been taking diazepam twice a day, every day, prior to admission, whereas on initial assessment she minimized use saying she took med only a few times a month.     Progressing overall: Regressed in last 24 hours  Suicidal ideation: Denies  Homicidal ideation: Denies  Medication side effects: No    ROS: Patient has new complaints: no    Current Medications Ordered:   ARIPiprazole  5 mg Oral Daily    diazePAM  2.5 mg Oral BID    insulin lispro  15 Units Subcutaneous TID     insulin glargine  30 Units Subcutaneous BID    insulin lispro  0-12 Units Subcutaneous TID WC    family  -Current outpatient follow-up: Needs connection     Estimated length of stay: 7 to 10 days     Criteria for Discharge:  Not psychotic, not homicidal, not suicidal, behavioral disturbance under control, sleeping well, mood improved/stable, eating well, aftercare arranged. Total face to face time with patient was 30 minutes and more than 50 % of that time was spent counseling the patient on their symptoms, treatment and expected goals.     Jim Hansen MD  Staff Psychiatrist

## 2020-07-15 NOTE — BH NOTE
Pt has been pleasant and cooperative today. Medication compliant. Denies SI/HI/AVH. She has been up on the unit, socializing with select peers. Denies pain. Pt had unwitnessed fall, MD notified CT ordered. Clinical notified. [facility] Unit manager notified. Pt currently lying in bed. Bed in low locked position, alarm on. Non skid socks in place.

## 2020-07-16 LAB
GLUCOSE BLD-MCNC: 146 MG/DL (ref 70–99)
GLUCOSE BLD-MCNC: 148 MG/DL (ref 70–99)
GLUCOSE BLD-MCNC: 224 MG/DL (ref 70–99)
GLUCOSE BLD-MCNC: 349 MG/DL (ref 70–99)
PERFORMED ON: ABNORMAL

## 2020-07-16 PROCEDURE — 97535 SELF CARE MNGMENT TRAINING: CPT

## 2020-07-16 PROCEDURE — 99232 SBSQ HOSP IP/OBS MODERATE 35: CPT | Performed by: PHYSICIAN ASSISTANT

## 2020-07-16 PROCEDURE — 1240000000 HC EMOTIONAL WELLNESS R&B

## 2020-07-16 PROCEDURE — 6370000000 HC RX 637 (ALT 250 FOR IP): Performed by: PHYSICIAN ASSISTANT

## 2020-07-16 PROCEDURE — 6370000000 HC RX 637 (ALT 250 FOR IP): Performed by: PSYCHIATRY & NEUROLOGY

## 2020-07-16 PROCEDURE — 99233 SBSQ HOSP IP/OBS HIGH 50: CPT | Performed by: PSYCHIATRY & NEUROLOGY

## 2020-07-16 PROCEDURE — 97530 THERAPEUTIC ACTIVITIES: CPT

## 2020-07-16 RX ORDER — ARIPIPRAZOLE 10 MG/1
10 TABLET ORAL DAILY
Status: DISCONTINUED | OUTPATIENT
Start: 2020-07-17 | End: 2020-07-21

## 2020-07-16 RX ORDER — SERTRALINE HYDROCHLORIDE 100 MG/1
100 TABLET, FILM COATED ORAL DAILY
Status: DISCONTINUED | OUTPATIENT
Start: 2020-07-17 | End: 2020-07-25

## 2020-07-16 RX ORDER — INSULIN GLARGINE 100 [IU]/ML
32 INJECTION, SOLUTION SUBCUTANEOUS 2 TIMES DAILY
Status: DISCONTINUED | OUTPATIENT
Start: 2020-07-16 | End: 2020-07-22

## 2020-07-16 RX ORDER — DIVALPROEX SODIUM 250 MG/1
250 TABLET, DELAYED RELEASE ORAL 3 TIMES DAILY
Status: DISCONTINUED | OUTPATIENT
Start: 2020-07-16 | End: 2020-07-24

## 2020-07-16 RX ADMIN — LEVOTHYROXINE SODIUM 175 MCG: 150 TABLET ORAL at 09:48

## 2020-07-16 RX ADMIN — ATORVASTATIN CALCIUM 10 MG: 10 TABLET, FILM COATED ORAL at 09:50

## 2020-07-16 RX ADMIN — INSULIN GLARGINE 32 UNITS: 100 INJECTION, SOLUTION SUBCUTANEOUS at 20:27

## 2020-07-16 RX ADMIN — LISINOPRIL 5 MG: 5 TABLET ORAL at 09:50

## 2020-07-16 RX ADMIN — ASPIRIN 81 MG: 81 TABLET, COATED ORAL at 09:50

## 2020-07-16 RX ADMIN — METFORMIN HYDROCHLORIDE 1000 MG: 500 TABLET ORAL at 16:56

## 2020-07-16 RX ADMIN — DIAZEPAM 2.5 MG: 5 TABLET ORAL at 20:27

## 2020-07-16 RX ADMIN — METFORMIN HYDROCHLORIDE 1000 MG: 500 TABLET ORAL at 09:49

## 2020-07-16 RX ADMIN — SPIRONOLACTONE 12.5 MG: 25 TABLET ORAL at 09:49

## 2020-07-16 RX ADMIN — SERTRALINE 200 MG: 100 TABLET, FILM COATED ORAL at 09:50

## 2020-07-16 RX ADMIN — INSULIN GLARGINE 30 UNITS: 100 INJECTION, SOLUTION SUBCUTANEOUS at 10:14

## 2020-07-16 RX ADMIN — CETIRIZINE HYDROCHLORIDE 10 MG: 10 TABLET ORAL at 09:48

## 2020-07-16 RX ADMIN — INSULIN LISPRO 1 UNITS: 100 INJECTION, SOLUTION INTRAVENOUS; SUBCUTANEOUS at 20:28

## 2020-07-16 RX ADMIN — PANTOPRAZOLE SODIUM 40 MG: 40 TABLET, DELAYED RELEASE ORAL at 09:50

## 2020-07-16 RX ADMIN — DIAZEPAM 2.5 MG: 5 TABLET ORAL at 09:50

## 2020-07-16 RX ADMIN — TRAZODONE HYDROCHLORIDE 25 MG: 50 TABLET ORAL at 20:36

## 2020-07-16 RX ADMIN — ARIPIPRAZOLE 5 MG: 5 TABLET ORAL at 09:49

## 2020-07-16 ASSESSMENT — PAIN SCALES - GENERAL: PAINLEVEL_OUTOF10: 0

## 2020-07-16 NOTE — BH NOTE
Received call from pt's daughter, Annia Boyd, who was returning a call from "[employer]. Informed that he had left for the day, and if there was a message that could be left. Daughter spoke for nearly 20min about her mother, wanting to know if she was \"admitted to the psych unit because her sugar was high? \"  She proceeded to tell writer that her mother has an abundance of mental health issues on her side of the family, including depression and suicide. He aunt (pt's sister) has shared that as a child Wayne Young would sit a corner playing with paper doll without speaking for the entire day. She states her mom's major problem was that she lies. Example given of pt being her mothers POA and stole all the money, and as a result has no contact with her family because Yumiko Linda are crazy and they stole\" from her. There has been no known hospitalizations for mental health that she is aware of, Wayne Young has a long history of on and off use of zanax, and valium, numerous antidepressants including Zoloft and Cymbalta that makes her feel suicidal.  She is unaware of any formal diagnosis but states that interactions that the daughters therapist has had with her mother seem like they are \"borderline\". Daughter is forward with her MH hx including SI attempt at 23, and a strained relationship with her mother related to her \"odd relationship\" with her sister.

## 2020-07-16 NOTE — PROGRESS NOTES
Progress Note    Admit Date:  7/13/2020    Subjective:  Ms. Noah Craven did have an unwitnessed fall yesterday. Reports she felt dizzy after taking her ambien and ativan. No chest pain or SOB. She did hit her head and reports she feels some tenderness along occipital region of head. CT head non acute. Objective:   BP (!) 129/91   Pulse 132   Temp 97.9 °F (36.6 °C) (Oral)   Resp 20   Ht 5' 7.5\" (1.715 m)   Wt 262 lb 11.2 oz (119.2 kg)   LMP 08/01/2012   SpO2 99%   BMI 40.54 kg/m²           Intake/Output Summary (Last 24 hours) at 7/16/2020 1340  Last data filed at 7/15/2020 1717  Gross per 24 hour   Intake 120 ml   Output --   Net 120 ml       Physical Exam:  Gen: No distress. Alert. Pleasant  female, morbidly obese  Eyes: PERRL. No sclera icterus. No conjunctival injection. ENT: No discharge. Pharynx clear w/o erythema or exudates  Neck:  Trachea midline. Resp: No accessory muscle use. No crackles. No wheezes. No rhonchi. On RA  CV: Regular rate. Regular rhythm. No murmur. No rub. No edema. GI: Soft, obese, Non-tender. Non-distended. Normal bowel sounds. Skin: Warm and dry. No rash on exposed extremities. No ecchymosis noted to occipital region   M/S: No cyanosis. No clubbing. Good UE and LE strength bilaterally - 5/5; mildly TTP along occiput, good ROM at neck w/ rotation  Neuro: Awake. Grossly nonfocal, CN II-XII intact, ambulates w/o assistance  Psych: Oriented x 3. Defer to psychiatry.     Scheduled Meds:   [START ON 7/17/2020] sertraline  100 mg Oral Daily    divalproex  250 mg Oral TID    [START ON 7/17/2020] ARIPiprazole  10 mg Oral Daily    diazePAM  2.5 mg Oral BID    insulin lispro  15 Units Subcutaneous TID WC    insulin glargine  30 Units Subcutaneous BID    insulin lispro  0-12 Units Subcutaneous TID WC    insulin lispro  0-6 Units Subcutaneous Nightly    mupirocin   Topical Daily    aspirin  81 mg Oral Daily    cetirizine  10 mg Oral Daily    lisinopril  5 mg Oral Daily    pantoprazole  40 mg Oral Daily    atorvastatin  10 mg Oral Daily    spironolactone  12.5 mg Oral Daily    levothyroxine  175 mcg Oral Daily    metFORMIN  1,000 mg Oral BID WC       Continuous Infusions:   dextrose         PRN Meds:  sodium chloride flush, LORazepam **OR** LORazepam **OR** LORazepam **OR** LORazepam **OR** LORazepam **OR** LORazepam **OR** LORazepam **OR** LORazepam, phenol, acetaminophen, haloperidol lactate **OR** haloperidol, traZODone, benztropine mesylate, magnesium hydroxide, aluminum & magnesium hydroxide-simethicone, glucose, dextrose, glucagon (rDNA), dextrose, cyclobenzaprine, zolpidem    CULTURES    SARS-CoV-2: not detected     RADIOLOGY  none    Assessment/Plan:    Depression  - cont mgmt per BHI    Fall  - CT head non-acute  - reports 2/2 medications that \"made me dizzy\"  - c/o mild pain in occipital region  - PRN ice    Sore Throat  - add PRN Phenol spray    Sinus Tachycardia  - 2/2 benzo w/d  - monitor for now -> BP is stable, if HR continues to be elevated may consider adding PRN clonidine     Type II DM  - uncontrolled -> improving, Hgb A1c: 10.8  - increase Lantus to 32 units BID, cont Metformin 1000 mg, increase Humalog to 18 units TID with meals, cont med dose SSI  - POC Glucose, carb control diet  - dietitian consulted     HTN  - well controlled  - cont Lisinopril, Aldactone  - monitor     Hypothyroidism  - home dose of synthroid 175 mcg      Hx of Meniere's Disease  - low Na diet     GERD  - cont Protonix     Allergies  - cont Zyrtec     HLD  - cont Lipitor     GEOFF  - cont home BiPAP     Morbid Obesity  - Body mass index is 40.54 kg/m². - Complicating assessment and treatment. Placing patient at risk for multiple co-morbidities as well as early death and contributing to the patient's presentation.   - Counseled on weight loss.      Juaquin Wadsworth PA-C 1:40 PM 7/16/2020

## 2020-07-16 NOTE — PROGRESS NOTES
Inpatient Occupational Therapy Treatment Note    Unit:  [facility]  Date:  2020  Patient Name:    Masoud Vargas  Admitting diagnosis:  Depression, unspecified depression type [F32.9]  Admit Date:  2020  Precautions/Restrictions/WB Status/ Lines/ Wounds/ Oxygen:  Standard BHI Precautions  History of Present Illness:  60 y. o. female with a PMH of Depression, GERD, HLD, HTN, Hypothyroidism, Meniere Disorder, GEOFF, and type II DM who presented to [facility] for worsening depression. Treatment Number:  3    Treatment Time: 817-650  Timed Code Treatment Minutes:   49 minutes   Total Treatment Time:   49   minutes    Staff Recommendations:  Assist of 1 with use of No AD for all ambulation within community room     Discharge Recommendations:  Home with PRN assist and Home OT    DME needs for discharge:  Shower Chair    AM-PAC Score: AM-PAC Inpatient Daily Activity Raw Score: 19  Home Health S4 Level: Level 1- Standard     MOCA:  [place] Cognitive Assessment ([address] [place] [address])  (See pt folder behind nurses station for copy of assessment while pt is admitted.)   Total Score: Sum of all subscores listed on the right-hand side. Add one point for an individual who has 12 years or fewer of formal education, for a possible maximum of 30 points. A final total score of 26 and above is considered normal.      Education Level:  >HS     Alternating [place] Makin/1  Visuoconstructional Skills (cube)  1/1  Cisuoconstructional Skills (clock)  3/3  Naming    2/3  Attention    6/6  Sentence Repetition    1/2  Verbal Fluency    1/1  Abstraction     2/2  Delayed Recall   2/5  Orientation     5/6  (additional point for <12 grade educations)     Total Score:    23/30       Subjective:  Pt was found seated in her room today, was receptive to OT tx. Reports she forgot to tell the doctor that she has a hx of Menierre's disease and takes Spironolactone once daily in the morning    ADLs:  Sleep hygiene: Reviewed sleep hygeine handout with pt.   She reports she already does a lot of these things    Pain   No   Rating:NA  Location:NA  Pain Medicine Status: NA    Cognition    A&O x4 with exception of correct date-one day off  Able to follow:  2 step commands    Balance: Good static balance. Attempted ambulation with RW today-pt carried it vs. Rolling with it. Instead pt ambulated through common room without LOB except when turning quickly. Pt has a tendency to walk and turn very quickly. She was educated re: importance of slowing down, especially when turning. Bed mobility:  NT    Transfer Training:   Sit to stand:   Supervision  Stand to sit:  Supervision  Bed to Chair:  CGA without AD  Standard toilet:   Not Tested    Activity Tolerance   Pt completed therapy session with No adverse symptoms noted w/activity    Therapeutic Exercise: NA    Patient Education:   Role of OT, MOCA, sleep hygiene, Recommendations for DC. Instructed pt re: need for assist when ambulating to prevent further falls. Positioning Needs: In common room with needs met    Family Present:  No    Assessment: Pt tolerated OT tx session well today. Pt continued to verbalize worry re: her health (various issues). Reported fall last night-stated she turned too quickly and was walking without assist.  Instructed her she needs someone with her for ambulation to ensure safety and she verbalized understanding. Pt should continue to benefit from skilled OT tx to maximize independence so that she may eventually return home with the least amount of assistance. GOALS  To be met in 3 Visits:  1. Pt. To verbalize 3 coping skills. 2. Pt to complete ACLS/MOCA. (Goal met 7/16/20)     To be met in 5 Visits:  1. Pt. To complete interest checklist.  (Goal met 7/14/20)  3. Pt. To verbalize understanding of sleep hygiene education. (Goal met 7/16/20)  2. Pt. To complete daily schedule of healthy activities/routines with minimal assist.  3. Pt. To complete wellness plan. 4. Pt.  To complete 1 SMART long term goal and 2 SMART short term goals with minimal assist.         Plan: cont with [facility] MS, OTR/L  #[id number]        If patient discharges from this facility prior to next visit, this note will serve as the Discharge Summary

## 2020-07-16 NOTE — BH NOTE
Ciro Mendoza was invited and encouraged to attend [facility] [employer]. Pt declined invite, stating \"I don't feel good. They were here at [place] and I haven't slept since. \" Pt was unwilling to elaborate. MT-BC encouraged pt to attend group and let staff know if she changed her mind. Pt did not attend group.     Ruslan Garcia, MT-BC

## 2020-07-16 NOTE — PLAN OF CARE
Deshawn Jacob has been visible on the unit watching television and socializing with select peers. She was hesitant to take meds tonight. Asked writer if she should talk to the doctor about going off all meds. Stated, \"I'm starting to be afraid of them, like paranoid. \" Encouraged pt not to stop taking her medications and explained that paranoia is not normal and is probably all the more reason for her to take medications. She denies SI/HI/AVH but appears preoccupied at times. PRN Ambien given with HS meds. FSBS = 257 tonight. Lantus and SSI provided. Pt initially asked for a shower and when staff was free to do it, pt decided she just wanted to use bath wipes instead. She is currently in bed. Bed alarm on. Instructed not to get up without assistance. Seems forgetful because she will impulsively jump up after being instructed not to do so. Appetite good. No falls this shift. Will continue to monitor.

## 2020-07-16 NOTE — BH NOTE
Pt VS obtained to assess CIWA, BP WNL, pulse is elevated at 146. Pt is agitated with this writer, MD aware. Directed to take pulse manually, and report if irregular. Charge nurse attempted to reassess to not further aggravate her, she refused, stating only Dr. Amparo Macias could take it.

## 2020-07-16 NOTE — BH NOTE
Pt reported to [facility] that she has sinus tachycardia and mitral valve prolapse. In separate interaction pt requesting 2 types of eyedrops that she states is glass and has a pump (Left eye) since 8/19. Reported that [facility] would have the information we needed, then retracted and said \"don't put that on my record, I don't want anyone to know about that. \"  She reports Vlad in [facility] has information about her prescriptions. Will call pharmacy when open to verify.

## 2020-07-16 NOTE — BH NOTE
Pt requested while speaking with Dr. Taylor Cabrera that she wanted to sign out. She was asked to sign,and educatated on the three day letter. She refused to sign, stating she \" needs to call her brother in law before signing anything\" and wanted to know what the catch was. She requested and allowed to take form to her room, and asked to return it before end of shift, she verbally agreed, but is now refusing.

## 2020-07-16 NOTE — BH NOTE
Pt inquiring where \"her\" phone is, referring to unit mobile phones, and asked if writer took it. Writer explained that phones are for the community and could not indefinitely stay with one patient. She stated, \"I am here 24hrs, and my daughter tells me when she will call and I have to be available to speak with her\", writer explained that phone calls come to the main desk, and staff transfer calls, if it is free time, to a portable and then provided to the patient. She was insistent that her daughter called, because she heard the phone ring, and we didn't let her talk. Informed patient that no calls have been received from her daughter that writer was aware of. She requested the phones so she could call her daughter, Nasim Pace explained that it was group time, and as soon as group was over she could make a call. Answer was unacceptable to patient. She then inquired about her \"cream\", through discussion it was established she was speaking about mupirocin, writer offered to put some in a cup for her use, and she refused stating, \"don't do that, it's expensive, just give me my tube. \"  Writer explained that she has tube issued by the hospital that she could use, but it could not be left in her room because it was against hospital policy to leave medication in patients rooms. She replied, \"since when, it has never been that way here\", writer reinforced that following hospital policy was part of writers job, she then requested to speak with \"the person in charge\". She agreed to speak with charge nurse about concerns, charge nurse informed and will speak with her. Note there was no medication/cream in her room, appropriately located in medication cabinet.

## 2020-07-16 NOTE — BH NOTE
Cordell Baer is AOx4, denies SIHI and AVH. She reports feeling \"crappy\" this morning that she attributes to taking Ambien and Ativan last evening, but states she is otherwise \"great, but you know, not happy. \" She initially agreed to medication administration and then became suspicious and wanted to shuffle pills around. Prior to this, during interview she stated that should would take her meds, knew what she was taking, and didn't have any problems with them. After offering to provide more detailed information she stated, \"I'll take them, I don't want to be a pain\". Reinforced that we were here to help her, and want her to understand all of her medications.
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
## 2020-07-16 NOTE — BH NOTE
Approached patient about new medication ordered for her to start. She rolled her eyes, turned her head and stated, \"no, I wan't to talk to him\". Verified if she was speak about charge nurse or Dr. Jennifer Richmond, she stated \"Olaf\". All parties notified. Charge nurse will re-attempt to administer medication.

## 2020-07-16 NOTE — FLOWSHEET NOTE
07/16/20 1236   Encounter Summary   Services provided to: Patient   Support System Children;Muslim/martir community   Continue Visiting   (7/16 Pt attended Spirituality Group.)   Complexity of Encounter High   Length of Encounter 1 hour   Spiritual Assessment Completed Yes   Spiritual/Gnosticism   Type Spiritual support   Assessment Calm; Approachable; Hopeful   Intervention Active listening;Explored feelings, thoughts, concerns;Explored coping resources;Nurtured hope;Prayer;Scripture;Provided reading materials/devotional materials;Sustaining presence/ Ministry of presence;Empowerment; Discussed meaning/purpose;Discussed relationship with God   Outcome Connection/belonging;Comfort;Expressed gratitude;Expressed feelings of ana luisa, peace, and/or awe;Engaged in conversation; Shared life review;Expressed feelings/needs/concerns;Encouraged; Hopeful;Receptive

## 2020-07-16 NOTE — PROGRESS NOTES
Nutrition Education  Patient was sitting in common area upon approaching her this afternoon. This RD explained that a consult had been ordered for RD to provide diabetes nutrition education. Patient declined RD reviewing handouts with her but was agreeable to take the education folder. Patient reported that she didn't want to review education with this RD because she was \"trying to track down Dr. Mile Reeves" because she needed to speak with him. Patient seemed appreciative of diabetes education folder because she thanked this RD multiple times prior to ending conversation. · Verbally + briefly explained information with PATIENT. · Educated on 7/16/20  · Written educational materials provided. · Contact name and number provided. · Refer to Patient Education activity for more details.     Electronically signed by Timothy Blanchard RD, LD on 7/16/20 at 4:09 PM EDT    Contact: [phone]

## 2020-07-16 NOTE — BH NOTE
Pt was pleasant and interactive with writer while delivering meal and getting drink request.  She was approached after eating by writer about taking evening medications, Humalog and metformin, pt reasoned that she would take both of those because \"they are life saving medications, but I will not take any psych meds. \"  Presented patient with metformin in package, and she was compliant with administration. She asked about cream again, and offered two options for application, and she chose to inform writer when she was ready to apply it herself with writer in the room so she could take the cream directly out of the tube. She was frustrated, but stated she would. She inquired about her lisinopril because she doesn't remember taking it this morning. Reminded her that she had, and she then wanted to know if it was a whole pill or half pill, informed her that it was whole. She thanked writer.

## 2020-07-16 NOTE — BH NOTE
Spoke to patient per request. Pt. Wanted to voice complaints about phone policy, use of medication (topical cream) was upset over washing of her clothes as she does not like to wear our hospital gowns, also needing a shower. Pt. Was told about phone usage was not allowed during groups and that while not in use phones were to be kept in nurses station on a [facility]. Medications could not be stored in patients rooms as she wanted to keep cream in her room, her clothes can be washed however, the wash/dry cycle can take most of the day. It was reported that patient was offered a shower last night to which she declined. She acknowledged this fact but added she could tell they really didn't want to give her a shower. Shower offered patient refused, topical cream offered patient refused. Pt. Stated how great of a nurse writer is and that she feels sorry for me that I have to deal with the other nurse. Patient redirected.

## 2020-07-17 LAB
GLUCOSE BLD-MCNC: 208 MG/DL (ref 70–99)
GLUCOSE BLD-MCNC: 220 MG/DL (ref 70–99)
GLUCOSE BLD-MCNC: 248 MG/DL (ref 70–99)
GLUCOSE BLD-MCNC: 252 MG/DL (ref 70–99)
PERFORMED ON: ABNORMAL

## 2020-07-17 PROCEDURE — 99233 SBSQ HOSP IP/OBS HIGH 50: CPT | Performed by: PSYCHIATRY & NEUROLOGY

## 2020-07-17 PROCEDURE — 6370000000 HC RX 637 (ALT 250 FOR IP): Performed by: PHYSICIAN ASSISTANT

## 2020-07-17 PROCEDURE — 94660 CPAP INITIATION&MGMT: CPT

## 2020-07-17 PROCEDURE — 6370000000 HC RX 637 (ALT 250 FOR IP): Performed by: PSYCHIATRY & NEUROLOGY

## 2020-07-17 PROCEDURE — 99232 SBSQ HOSP IP/OBS MODERATE 35: CPT | Performed by: PHYSICIAN ASSISTANT

## 2020-07-17 PROCEDURE — 1240000000 HC EMOTIONAL WELLNESS R&B

## 2020-07-17 RX ORDER — PREDNISOLONE ACETATE 10 MG/ML
1 SUSPENSION/ DROPS OPHTHALMIC 2 TIMES DAILY
Status: DISCONTINUED | OUTPATIENT
Start: 2020-07-18 | End: 2020-07-29 | Stop reason: HOSPADM

## 2020-07-17 RX ADMIN — DIVALPROEX SODIUM 250 MG: 250 TABLET, DELAYED RELEASE ORAL at 14:52

## 2020-07-17 RX ADMIN — METFORMIN HYDROCHLORIDE 1000 MG: 500 TABLET ORAL at 11:23

## 2020-07-17 RX ADMIN — MUPIROCIN: 20 OINTMENT TOPICAL at 11:25

## 2020-07-17 RX ADMIN — METFORMIN HYDROCHLORIDE 1000 MG: 500 TABLET ORAL at 16:53

## 2020-07-17 RX ADMIN — SPIRONOLACTONE 12.5 MG: 25 TABLET ORAL at 11:24

## 2020-07-17 RX ADMIN — DIVALPROEX SODIUM 250 MG: 250 TABLET, DELAYED RELEASE ORAL at 11:24

## 2020-07-17 RX ADMIN — DIVALPROEX SODIUM 250 MG: 250 TABLET, DELAYED RELEASE ORAL at 21:32

## 2020-07-17 RX ADMIN — INSULIN GLARGINE 32 UNITS: 100 INJECTION, SOLUTION SUBCUTANEOUS at 11:43

## 2020-07-17 RX ADMIN — ATORVASTATIN CALCIUM 10 MG: 10 TABLET, FILM COATED ORAL at 11:24

## 2020-07-17 RX ADMIN — INSULIN LISPRO 3 UNITS: 100 INJECTION, SOLUTION INTRAVENOUS; SUBCUTANEOUS at 21:30

## 2020-07-17 RX ADMIN — ARIPIPRAZOLE 10 MG: 10 TABLET ORAL at 11:23

## 2020-07-17 RX ADMIN — CETIRIZINE HYDROCHLORIDE 10 MG: 10 TABLET ORAL at 11:24

## 2020-07-17 RX ADMIN — LEVOTHYROXINE SODIUM 175 MCG: 150 TABLET ORAL at 11:23

## 2020-07-17 RX ADMIN — INSULIN GLARGINE 32 UNITS: 100 INJECTION, SOLUTION SUBCUTANEOUS at 23:00

## 2020-07-17 RX ADMIN — Medication 1 SPRAY: at 21:36

## 2020-07-17 RX ADMIN — SERTRALINE HYDROCHLORIDE 100 MG: 100 TABLET ORAL at 11:24

## 2020-07-17 RX ADMIN — DIAZEPAM 2.5 MG: 5 TABLET ORAL at 11:25

## 2020-07-17 RX ADMIN — DIAZEPAM 2.5 MG: 5 TABLET ORAL at 21:32

## 2020-07-17 RX ADMIN — PANTOPRAZOLE SODIUM 40 MG: 40 TABLET, DELAYED RELEASE ORAL at 11:23

## 2020-07-17 RX ADMIN — LISINOPRIL 5 MG: 5 TABLET ORAL at 11:24

## 2020-07-17 RX ADMIN — ASPIRIN 81 MG: 81 TABLET, COATED ORAL at 11:23

## 2020-07-17 ASSESSMENT — PAIN SCALES - GENERAL
PAINLEVEL_OUTOF10: 0

## 2020-07-17 NOTE — BH NOTE
[facility]  Day 3 Interdisciplinary Treatment Plan NOTE    Review Date & Time: 7/17/20 1105    Patient was not in treatment team    Admission Type:   Admission Type: Involuntary    Reason for admission:  Reason for Admission: Depression  Estimated Length of Stay Update:  7-10 days  Estimated Discharge Date Update: 7/21-7/24/20    PATIENT STRENGTHS:  Patient Strengths Strengths: Communication, Positive Support, Social Skills, Motivated  Patient Strengths and Limitations:Limitations: Difficult relationships / poor social skills, Tendency to isolate self  Addictive Behavior:Addictive Behavior  In the past 3 months, have you felt or has someone told you that you have a problem with:  : None  Do you have a history of Chemical Use?: No  Do you have a history of Alcohol Use?: Yes(Pt drank in the late 1980's.)  Do you have a history of Street Drug Abuse?: No  Histroy of Prescripton Drug Abuse?: No  Medical Problems:  Past Medical History:   Diagnosis Date    ADHD (attention deficit hyperactivity disorder)     Anesthesia     slow to wake when she was on diazepam    Chronic back pain     Deafness in right ear 1997    Depression     GERD (gastroesophageal reflux disease)     Hyperlipidemia     Hypertension     resolved    Hypothyroidism     Meniere disorder     Morbid obesity ([address]     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    Panic disorder without agoraphobia     Rash     follow be dermatologist, skin intact    Seizure ([address]     Toxemia    Sleep apnea     cpap    Type 2 diabetes, uncontrolled, 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 (HCC)     left eye    Vertigo        Risk:  Fall RiskTotal: 69  Brendan Scale Brendan Scale Score: 22  BVC Total: 0  Change in scores NA. Changes to plan of Care NA.      Status EXAM:   Status and Exam  Normal: No  Facial Expression: Worried, Sad  Affect: Unstable  Level of Consciousness: Alert  Mood:Normal: No  Mood: Anxious, Suspicious, Labile, Sad, Irritable  Motor Activity:Normal: Yes  Interview Behavior: Impulsive, Irritable, Uncooperative/Withdrawn  Preception: [place] to Person, Renee Marrow to Time, [place] to Place, [place] to Situation  Attention:Normal: No  Attention: Distractible  Thought Processes: Loose Assoc., Perseveration  Thought Content:Normal: No  Thought Content: Paranoia, Preoccupations  Hallucinations: None  Delusions: Yes  Delusions: Persecution  Memory:Normal: No  Memory: Poor Recent, Confabulation  Insight and Judgment: No  Insight and Judgment: Poor Judgment, Poor Insight, Unrealistic  Present Suicidal Ideation: No  Present Homicidal Ideation: No    Daily Assessment Last Entry:   Daily Sleep (WDL): Within Defined Limits         Patient Currently in Pain: Denies  Daily Nutrition (WDL): Within Defined Limits    Patient Monitoring:  Frequency of Checks: 4 times per hour, close    Psychiatric Symptoms:   Depression Symptoms  Depression Symptoms: Increased irritability, Sleep disturbance  Anxiety Symptoms  Anxiety Symptoms: Generalized  Imani Symptoms  Imani Symptoms: Labile, Poor judgment     Psychosis Symptoms  Delusion Type: Persecutory, Paranoid    Suicide Risk CSSR-S:  1) Within the past month, have you wished you were dead or wished you could go to sleep and not wake up? : Yes  2) Have you actually had any thoughts of killing yourself? : Yes  3) Have you been thinking about how you might kill yourself? : Yes  5) Have you started to work out or worked out the details of how to kill yourself? Do you intend to carry out this plan? : Yes  6) Have you ever done anything, started to do anything, or prepared to do anything to end your life?: No  Change in Result NA.  Change in Plan of care NA.       EDUCATION:   Learner Progress Toward Treatment Goals: Reviewed results and recommendations of this team    Method: Individual    Outcome: Verbalized understanding    PATIENT GOALS: none given    PLAN/TREATMENT RECOMMENDATIONS UPDATE: continue treatment    GOALS UPDATE:   Time frame for Short-Term Goals: N/A      Alberto Franco RN

## 2020-07-17 NOTE — PROCEDURES
1:1 Assessment completed, see flow sheet. Patient is alert and oriented and able to make needs known. Patient denied SI/HI/AVH. Refused depakote this shift. Trazadone administered per pt request for sleep. Appetite good for snack. No behavioral outbursts this shift. Resting quietly in bed with eyes closed at present. Will continue to monitor.

## 2020-07-17 NOTE — PLAN OF CARE
Problem: Falls - Risk of:  Goal: Will remain free from falls  Description: Will remain free from falls  Outcome: Ongoing  Goal: Absence of physical injury  Description: Absence of physical injury  Outcome: Ongoing     Problem: Health Maintenance - Impaired:  Goal: Able to sleep without medication for appropriate length of time  Description: Able to sleep without medication for appropriate length of time  Outcome: Ongoing     Problem: Mood - Altered:  Goal: Mood stable  Description: Mood stable  Outcome: Ongoing    Pt appears to be increasingly paranoid and labile w/ staff. Isolative to room. Suspicious of staff. Pt was attempting to call her daughter on a patient phone and stated she kept getting routed to the nurse's station phones. Pt stated this was due to one of the staff members forwarding all calls so she could not call her daughter. Pt was reassured this was not the case and assisted her in calling her daughter successfully. CIWA=0 throughout shift. Insulin coverage held for breakfast and lunch due to pt not eating. Insulin coverage provided for dinner. Pt social w/ peers in the evening. Med compliant w/ encouragement but continues to question meds extensively.

## 2020-07-17 NOTE — PROGRESS NOTES
Progress Note    Admit Date:  7/13/2020    Subjective:  Ms. Mathew Nieto denies any concerns. Resting in bed. RN reported pt seemed more drowsy today and did receive 25 mg Trazodone last night - which was new    Objective:   /81   Pulse 79   Temp 96.6 °F (35.9 °C) (Oral)   Resp 16   Ht 5' 7.5\" (1.715 m)   Wt 262 lb 11.2 oz (119.2 kg)   LMP 08/01/2012   SpO2 98%   BMI 40.54 kg/m²           Intake/Output Summary (Last 24 hours) at 7/17/2020 1414  Last data filed at 7/16/2020 1722  Gross per 24 hour   Intake 240 ml   Output --   Net 240 ml       Physical Exam:  Gen: No distress. Alert. Pleasant  female, morbidly obese  Eyes: PERRL. No sclera icterus. No conjunctival injection. ENT: No discharge. Pharynx clear w/o erythema or exudates  Neck:  Trachea midline. Resp: No accessory muscle use. No crackles. No wheezes. No rhonchi. On RA  CV: Regular rate. Regular rhythm. No murmur. No rub. No edema. GI: Soft, obese, Non-tender. Non-distended. Normal bowel sounds. Skin: Warm and dry. No rash on exposed extremities. No ecchymosis noted to occipital region   M/S: No cyanosis. No clubbing. Good UE and LE strength bilaterally - 5/5; mildly TTP along occiput, good ROM at neck w/ rotation  Neuro: Awake. Grossly nonfocal, CN II-XII intact, ambulates w/o assistance  Psych: Oriented x 3. Defer to psychiatry.     Scheduled Meds:   sertraline  100 mg Oral Daily    divalproex  250 mg Oral TID    ARIPiprazole  10 mg Oral Daily    insulin lispro  18 Units Subcutaneous TID WC    insulin glargine  32 Units Subcutaneous BID    diazePAM  2.5 mg Oral BID    insulin lispro  0-12 Units Subcutaneous TID WC    insulin lispro  0-6 Units Subcutaneous Nightly    mupirocin   Topical Daily    aspirin  81 mg Oral Daily    cetirizine  10 mg Oral Daily    lisinopril  5 mg Oral Daily    pantoprazole  40 mg Oral Daily    atorvastatin  10 mg Oral Daily    spironolactone  12.5 mg Oral Daily    levothyroxine  175 mcg Oral Daily    metFORMIN  1,000 mg Oral BID WC       Continuous Infusions:   dextrose         PRN Meds:  sodium chloride flush, LORazepam **OR** LORazepam **OR** LORazepam **OR** LORazepam **OR** LORazepam **OR** LORazepam **OR** LORazepam **OR** LORazepam, phenol, acetaminophen, haloperidol lactate **OR** haloperidol, traZODone, benztropine mesylate, magnesium hydroxide, aluminum & magnesium hydroxide-simethicone, glucose, dextrose, glucagon (rDNA), dextrose, cyclobenzaprine    CULTURES    SARS-CoV-2: not detected     RADIOLOGY  none    Assessment/Plan:    Depression  - cont mgmt per [facility]    Fall  - CT head non-acute  - reports 2/2 medications that \"made me dizzy\"  - c/o mild pain in occipital region  - PRN ice    Sore Throat - improved  - add PRN Phenol spray    Sinus Tachycardia - Resolved  - 2/2 benzo w/d  - monitor for now -> BP stable  - HR improved     Type II DM  - uncontrolled -> improving, Hgb A1c: 10.8  - increase Lantus to 32 units BID, cont Metformin 1000 mg, increase Humalog to 18 units TID with meals, cont med dose SSI  - POC Glucose, carb control diet  - dietitian consulted     HTN  - well controlled  - cont Lisinopril, Aldactone  - monitor     Hypothyroidism  - home dose of synthroid 175 mcg      Hx of Meniere's Disease  - low Na diet     GERD  - cont Protonix     Allergies  - cont Zyrtec     HLD  - cont Lipitor     GEOFF  - cont home BiPAP     Morbid Obesity  - Body mass index is 40.54 kg/m². - Complicating assessment and treatment. Placing patient at risk for multiple co-morbidities as well as early death and contributing to the patient's presentation.   - Counseled on weight loss.      Zac Rollins PA-C 2:14 PM 7/17/2020

## 2020-07-17 NOTE — PROGRESS NOTES
Nightly    mupirocin   Topical Daily    aspirin  81 mg Oral Daily    cetirizine  10 mg Oral Daily    lisinopril  5 mg Oral Daily    pantoprazole  40 mg Oral Daily    atorvastatin  10 mg Oral Daily    spironolactone  12.5 mg Oral Daily    levothyroxine  175 mcg Oral Daily    metFORMIN  1,000 mg Oral BID       PRN Meds: sodium chloride flush, LORazepam **OR** LORazepam **OR** LORazepam **OR** LORazepam **OR** LORazepam **OR** LORazepam **OR** LORazepam **OR** LORazepam, phenol, acetaminophen, haloperidol lactate **OR** haloperidol, traZODone, benztropine mesylate, magnesium hydroxide, aluminum & magnesium hydroxide-simethicone, glucose, dextrose, glucagon (rDNA), dextrose, cyclobenzaprine     Objective:     PE:    /81   Pulse 79   Temp 96.6 °F (35.9 °C) (Oral)   Resp 16   Ht 5' 7.5\" (1.715 m)   Wt 262 lb 11.2 oz (119.2 kg)   LMP 08/01/2012   SpO2 98%   BMI 40.54 kg/m²         Mental Status Examination:    Appearance: AF, appears stated age, wearing pajamas, good grooming and hygiene  Behavior/Attitude toward examiner:  Uncooperative, argumentative  Speech: Rambling, rapid, irritable  Mood:  \"I don't feel safe here\"  Affect:  Labile and irritable  Thought processes:  Loose and illogical  Thought Content: Chronic SI, denies active intent, denies HI, +paranoid delusions, +confabulation  Perceptions: +VH, not actively RTIS during interview  Attention: impaired  Abstraction: WNL  Cognition: Average LUDWIG, Alert and oriented to person, place, time, and situation, recall intact  Insight: Poor insight   Judgment: Impaired judgment      LAB: Reviewed labs from last 24 hours      Assessment and Plan:     Diagnoses:   Primary Psychiatric (DSM V) Diagnosis: Unspecified psychosis  Secondary Psychiatric (DSM V) Diagnoses: Unspecified mood disorder; Panic disorder without agoraphobia  Chemical Dependency Diagnoses: None  Active Medical Diagnoses:        Patient Active Problem List     Diagnosis Date Noted    Uncontrolled type 2 diabetes mellitus with hyperglycemia ([facility] [address]      Depression 07/12/2020    Panic disorder without agoraphobia      Attention deficit hyperactivity disorder (ADHD), predominantly inattentive type 05/17/2017    Nausea and vomiting 03/11/2013    Gastric banding status 12/12/2012    Morbid obesity (HCC)      Sleep apnea      Chronic back pain      Meniere disorder      Meniere disease 08/29/2011    GEOFF (obstructive sleep apnea) 08/29/2011    Hypertension      Hyperlipidemia      Obesity      Type 2 diabetes, uncontrolled, 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      Tx plan: Generally: prevent self injury/aggression, stabilize mood/anxiety/psychotic/behavioral disturbance, establish/maintain aftercare, increase coping mechanisms, improve medication compliance.  All conditions present on admission are being treated while pt is hospitalized.      Legal Status: Signed voluntary 7/13. Patient now demanding discharge. Patient signed 3 day letter on 7/17.     Primary Psychiatric Issues:  1. Unspecified psychotic and mood disorders  Patient demonstrating increasingly unstable and psychotic behavior. I suspect that she may have underlying bipolar disorder and is actually in a mixed, psychotic episode. It is also possible that this is a psychotic depressive episode, but the valence of her psychosis is not classic for MDD.   BZD withdrawal maintains a potential contributor, but I doubt it explains everything.  -Started Depakote 250 mg TID on 7/16.  -Increased abilify to 10 mg daily on 7/16.  -Decreased sertraline to 100 mg daily on 7/16.  -Continue diazepam 2.5 mg BID  -Continue CIWA protocol  -Head CT revealed no acute findings     Chemical Dependency Issues:  No issues     Function:  No acute functional impairement  -Falls precautions     Medical Problems:  Internal medicine has been consulted. Appreciate recs.     Code Status: Full     Disposition:    -Housing: With family  -Current outpatient follow-up: Needs connection     Estimated length of stay: 7 to 10 days     Criteria for Discharge:  Not psychotic, not homicidal, not suicidal, behavioral disturbance under control, sleeping well, mood improved/stable, eating well, aftercare arranged. Total face to face time with patient was 50 minutes and more than 50 % of that time was spent counseling the patient on their symptoms, treatment and expected goals.     Farzad Ponce MD  Staff Psychiatrist

## 2020-07-17 NOTE — BH NOTE
Kali Lan was invited and encouraged to attend [employer]. Pt declined invite and offer of individual activities. Pt did not attend group.     Jim Judge MT-BC

## 2020-07-18 LAB
ANION GAP SERPL CALCULATED.3IONS-SCNC: 14 MMOL/L (ref 3–16)
BUN BLDV-MCNC: 16 MG/DL (ref 7–20)
CALCIUM SERPL-MCNC: 9.2 MG/DL (ref 8.3–10.6)
CHLORIDE BLD-SCNC: 94 MMOL/L (ref 99–110)
CO2: 25 MMOL/L (ref 21–32)
CREAT SERPL-MCNC: 0.7 MG/DL (ref 0.6–1.2)
GFR AFRICAN AMERICAN: >60
GFR NON-AFRICAN AMERICAN: >60
GLUCOSE BLD-MCNC: 110 MG/DL (ref 70–99)
GLUCOSE BLD-MCNC: 113 MG/DL (ref 70–99)
GLUCOSE BLD-MCNC: 134 MG/DL (ref 70–99)
GLUCOSE BLD-MCNC: 172 MG/DL (ref 70–99)
GLUCOSE BLD-MCNC: 176 MG/DL (ref 70–99)
PERFORMED ON: ABNORMAL
POTASSIUM REFLEX MAGNESIUM: 4 MMOL/L (ref 3.5–5.1)
SODIUM BLD-SCNC: 133 MMOL/L (ref 136–145)

## 2020-07-18 PROCEDURE — 6370000000 HC RX 637 (ALT 250 FOR IP): Performed by: PSYCHIATRY & NEUROLOGY

## 2020-07-18 PROCEDURE — 6370000000 HC RX 637 (ALT 250 FOR IP): Performed by: PHYSICIAN ASSISTANT

## 2020-07-18 PROCEDURE — 1240000000 HC EMOTIONAL WELLNESS R&B

## 2020-07-18 PROCEDURE — 80048 BASIC METABOLIC PNL TOTAL CA: CPT

## 2020-07-18 PROCEDURE — 36415 COLL VENOUS BLD VENIPUNCTURE: CPT

## 2020-07-18 RX ADMIN — PANTOPRAZOLE SODIUM 40 MG: 40 TABLET, DELAYED RELEASE ORAL at 10:21

## 2020-07-18 RX ADMIN — DIVALPROEX SODIUM 250 MG: 250 TABLET, DELAYED RELEASE ORAL at 16:56

## 2020-07-18 RX ADMIN — ATORVASTATIN CALCIUM 10 MG: 10 TABLET, FILM COATED ORAL at 10:22

## 2020-07-18 RX ADMIN — DIVALPROEX SODIUM 250 MG: 250 TABLET, DELAYED RELEASE ORAL at 10:21

## 2020-07-18 RX ADMIN — PREDNISOLONE ACETATE 1 DROP: 10 SUSPENSION/ DROPS OPHTHALMIC at 21:37

## 2020-07-18 RX ADMIN — INSULIN GLARGINE 32 UNITS: 100 INJECTION, SOLUTION SUBCUTANEOUS at 21:00

## 2020-07-18 RX ADMIN — ARIPIPRAZOLE 10 MG: 10 TABLET ORAL at 10:22

## 2020-07-18 RX ADMIN — DIVALPROEX SODIUM 250 MG: 250 TABLET, DELAYED RELEASE ORAL at 21:00

## 2020-07-18 RX ADMIN — MUPIROCIN: 20 OINTMENT TOPICAL at 12:20

## 2020-07-18 RX ADMIN — CETIRIZINE HYDROCHLORIDE 10 MG: 10 TABLET ORAL at 10:22

## 2020-07-18 RX ADMIN — DIAZEPAM 2.5 MG: 5 TABLET ORAL at 21:00

## 2020-07-18 RX ADMIN — METFORMIN HYDROCHLORIDE 1000 MG: 500 TABLET ORAL at 16:56

## 2020-07-18 RX ADMIN — SERTRALINE HYDROCHLORIDE 100 MG: 100 TABLET ORAL at 10:22

## 2020-07-18 RX ADMIN — SPIRONOLACTONE 12.5 MG: 25 TABLET ORAL at 10:20

## 2020-07-18 RX ADMIN — DIAZEPAM 2.5 MG: 5 TABLET ORAL at 10:22

## 2020-07-18 RX ADMIN — INSULIN GLARGINE 32 UNITS: 100 INJECTION, SOLUTION SUBCUTANEOUS at 10:17

## 2020-07-18 RX ADMIN — ASPIRIN 81 MG: 81 TABLET, COATED ORAL at 10:20

## 2020-07-18 RX ADMIN — METFORMIN HYDROCHLORIDE 1000 MG: 500 TABLET ORAL at 10:19

## 2020-07-18 RX ADMIN — PREDNISOLONE ACETATE 1 DROP: 10 SUSPENSION/ DROPS OPHTHALMIC at 10:20

## 2020-07-18 RX ADMIN — LEVOTHYROXINE SODIUM 175 MCG: 150 TABLET ORAL at 10:19

## 2020-07-18 ASSESSMENT — PAIN SCALES - GENERAL
PAINLEVEL_OUTOF10: 0

## 2020-07-18 NOTE — PROGRESS NOTES
Pt appears drowsy again this AM but easily  Low BP of 89/67 this morning while sleeping, . Retaken when pt was awake and moving around. BP 97/68 and . No dizziness, lightheadedness, or other s/s of hypotension. Franky MOSQUEDA notified. Will continue to monitor per provider.

## 2020-07-18 NOTE — BH NOTE
Sathish Portillo was invited and encouraged to attend [employer]. Pt declined invite, stating \"I just ordered lunch. Maybe I'll come sit out there once it's here. \" MT-BC provided pt with maximum encouragement to come out of room and engage in socializing. Pt did not attend group.     Emely Rodriguez, MT-BC

## 2020-07-18 NOTE — BH NOTE
Ronak Ellis was invited and encouraged to attend [facility] [employer]. Pt declined, stating \"I'm just so beat. \" Pt did not attend group.     Jareth Castro, MT-BC

## 2020-07-18 NOTE — PLAN OF CARE
Problem: Falls - Risk of:  Goal: Will remain free from falls  Description: Will remain free from falls  Outcome: Ongoing  Goal: Absence of physical injury  Description: Absence of physical injury  Outcome: Ongoing     Problem: Mood - Altered:  Goal: Mood stable  Description: Mood stable  Outcome: Ongoing    Alert and oriented x4 at this time but intermittently confused. Pt has been pleasant, calm, and cooperative today. Appears less paranoid and suspicious. Positive interactions w/ staff and peers. Visible on the unit in the evening. Continues to be drowsy in the mornings. Slept through breakfast and lunch. CIWA q4h all 0. Med compliant. Denies SI/HI/AVH. No RTIS noted.

## 2020-07-18 NOTE — PROGRESS NOTES
07/17/20 2236   NIV Type   $NIV $Daily Charge   Skin Assessment Clean, dry, & intact   Equipment Type respironics   Mode CPAP   Mask Type Nasal mask   Mask Size Medium   Settings/Measurements   CPAP/EPAP 12 cmH2O   Resp 16   FiO2  21 %   Comfort Level Good   Using Accessory Muscles No   SpO2 97   Patient Observation   Observations spo2 97% on cpap 12

## 2020-07-19 LAB
GLUCOSE BLD-MCNC: 119 MG/DL (ref 70–99)
GLUCOSE BLD-MCNC: 133 MG/DL (ref 70–99)
GLUCOSE BLD-MCNC: 143 MG/DL (ref 70–99)
GLUCOSE BLD-MCNC: 174 MG/DL (ref 70–99)
PERFORMED ON: ABNORMAL

## 2020-07-19 PROCEDURE — 99233 SBSQ HOSP IP/OBS HIGH 50: CPT | Performed by: PSYCHIATRY & NEUROLOGY

## 2020-07-19 PROCEDURE — 1240000000 HC EMOTIONAL WELLNESS R&B

## 2020-07-19 PROCEDURE — 6370000000 HC RX 637 (ALT 250 FOR IP): Performed by: PSYCHIATRY & NEUROLOGY

## 2020-07-19 PROCEDURE — 6370000000 HC RX 637 (ALT 250 FOR IP): Performed by: PHYSICIAN ASSISTANT

## 2020-07-19 RX ADMIN — PANTOPRAZOLE SODIUM 40 MG: 40 TABLET, DELAYED RELEASE ORAL at 08:39

## 2020-07-19 RX ADMIN — INSULIN GLARGINE 32 UNITS: 100 INJECTION, SOLUTION SUBCUTANEOUS at 21:01

## 2020-07-19 RX ADMIN — PREDNISOLONE ACETATE 1 DROP: 10 SUSPENSION/ DROPS OPHTHALMIC at 21:10

## 2020-07-19 RX ADMIN — SERTRALINE HYDROCHLORIDE 100 MG: 100 TABLET ORAL at 08:38

## 2020-07-19 RX ADMIN — LORAZEPAM 1 MG: 1 TABLET ORAL at 22:50

## 2020-07-19 RX ADMIN — ASPIRIN 81 MG: 81 TABLET, COATED ORAL at 08:39

## 2020-07-19 RX ADMIN — INSULIN GLARGINE 32 UNITS: 100 INJECTION, SOLUTION SUBCUTANEOUS at 08:28

## 2020-07-19 RX ADMIN — LEVOTHYROXINE SODIUM 175 MCG: 150 TABLET ORAL at 08:38

## 2020-07-19 RX ADMIN — DIAZEPAM 2.5 MG: 5 TABLET ORAL at 08:39

## 2020-07-19 RX ADMIN — LORAZEPAM 2 MG: 2 TABLET ORAL at 15:13

## 2020-07-19 RX ADMIN — CETIRIZINE HYDROCHLORIDE 10 MG: 10 TABLET ORAL at 08:38

## 2020-07-19 RX ADMIN — Medication 1 SPRAY: at 21:10

## 2020-07-19 RX ADMIN — METFORMIN HYDROCHLORIDE 1000 MG: 500 TABLET ORAL at 17:05

## 2020-07-19 RX ADMIN — DIVALPROEX SODIUM 250 MG: 250 TABLET, DELAYED RELEASE ORAL at 21:03

## 2020-07-19 RX ADMIN — PREDNISOLONE ACETATE 1 DROP: 10 SUSPENSION/ DROPS OPHTHALMIC at 08:38

## 2020-07-19 RX ADMIN — TRAZODONE HYDROCHLORIDE 25 MG: 50 TABLET ORAL at 00:29

## 2020-07-19 RX ADMIN — DIAZEPAM 2.5 MG: 5 TABLET ORAL at 21:03

## 2020-07-19 RX ADMIN — METFORMIN HYDROCHLORIDE 1000 MG: 500 TABLET ORAL at 08:38

## 2020-07-19 RX ADMIN — MUPIROCIN: 20 OINTMENT TOPICAL at 10:23

## 2020-07-19 RX ADMIN — ATORVASTATIN CALCIUM 10 MG: 10 TABLET, FILM COATED ORAL at 08:39

## 2020-07-19 RX ADMIN — DIVALPROEX SODIUM 250 MG: 250 TABLET, DELAYED RELEASE ORAL at 15:13

## 2020-07-19 RX ADMIN — DIVALPROEX SODIUM 250 MG: 250 TABLET, DELAYED RELEASE ORAL at 08:38

## 2020-07-19 RX ADMIN — ARIPIPRAZOLE 10 MG: 10 TABLET ORAL at 08:37

## 2020-07-19 ASSESSMENT — PAIN SCALES - GENERAL
PAINLEVEL_OUTOF10: 0

## 2020-07-19 NOTE — PROGRESS NOTES
Pt stated that the trazodone is causing pain around and above her left eye at night, which is preventing her from sleeping at night. Pt was reassured that this adverse effect would be passed onto the provider and the next shift.

## 2020-07-19 NOTE — GROUP NOTE
Group Therapy Note    Date: 7/19/2020    Group Start Time: 1:00 PM  Group End Time: 2:00 PM  Group Topic: Recreational    [address] [facility]        Group Therapy Note    Attendees: 6       Patient's Goal:  Pt will participate in classic, \"Trivial Pursuit\" and utilize socialization skills. Notes:  Pt seemed to be struggling staying engaged during group. Often looking away and having to be reminded every time it was her turn or what she had to do.      Status After Intervention:  Unchanged    Participation Level: minimal     Participation Quality: Resistant      Speech:  normal      Thought Process/Content: Logical      Affective Functioning: Flat      Mood: depressed      Level of consciousness:  Preoccupied and Inattentive      Response to Learning: Resistant      Endings: None Reported    Modes of Intervention: Socialization and Activity      Discipline Responsible: /Counselor      Signature:  PRISCILLA Cash

## 2020-07-19 NOTE — PLAN OF CARE
Pt is alert and oriented at this time. She is forgetful and has asked 3X which medications she takes at bedtime. She was talking about the incident last night when she became confused and thought people were getting slammed against the wall and others were running from the unit. She states she knows it wasn't real, but states it was \"the other day\". She continues to question when she will go home. She states she has a job to get back to, and her daughter needs her. She remains isolative to her room, but did come out for snacks. After the other pts went to bed she came out to watch TV. She denies pain and anxiety, but did appear anxious earlier in the shift.

## 2020-07-19 NOTE — PLAN OF CARE
Had Asx hypotension yesterday. ACE and spironolactone held. Labs today look good, BP improved.  Would resume home medications tomorrow and continue to monitor     Elena Collins PA-C  7/19/2020 2:29 PM

## 2020-07-19 NOTE — PROGRESS NOTES
Pt came up to desk in the evening w/ belongings packed up c/o N/V, diarrhea x2, achy, hot/sweating. Stated she feels like she has the flu. Temp=97.3. Appeared confused. Wanted to leave to get \"fresh air\" and \"go to the doctor's office\". CIWA=11. Ativan 2 mg given per CIWA orders @ 1492. Effective. CIWA=1 when reassessed 1 hour later. Pt is currently resting in bed w/ eyes closed. Will continue to monitor.

## 2020-07-19 NOTE — BH NOTE
Ciro Mendoza was invited and encouraged to attend [employer]. Pt stated \"I'll try to make it out there. \" MT-BC provided pt with maximum encouragement to attend one of the groups today and pt verbalized agreement. Pt did not attend group.     Ruslan Garcia, MT-BC

## 2020-07-19 NOTE — PROGRESS NOTES
Department of Psychiatry  Attending Progress Note  Chief Complaint: Avni Elizondo was in bed with CPAP and was cooperative and less irritable from Dr. Ricky Moya' last entry. She was agreeable and felt that the medication has been helpful for her mood sx. Feels safe in hospital and denied any SI . Has been more compliant with meds. Patient's chart was reviewed and collaborated with  about the treatment plan. SUBJECTIVE:    Patient is feeling better. Suicidal ideation:  denies suicidal ideation. Patient does not have medication side effects. ROS: Patient has new complaints: no  Sleeping adequately:  Yes   Appetite adequate: Yes  Attending groups: Yes  Visitors:No    OBJECTIVE    Physical  VITALS:  /83   Pulse 127   Temp 98.7 °F (37.1 °C) (Oral)   Resp 16   Ht 5' 7.5\" (1.715 m)   Wt 262 lb 11.2 oz (119.2 kg)   LMP 08/01/2012   SpO2 98%   BMI 40.54 kg/m²     Mental Status Examination:  Patients appearance was street clothes. Thoughts are Goal directed. Homicidal ideations none. No abnormal movements, tics or mannerisms. Memory intact Aims 0. Concentration Good. Alert and oriented X 4. Insight and Judgement impaired insight. Patient was cooperative. Patient gait normal. Mood constricted, affect depressed affect Hallucinations Absent, suicidal ideations no specific plan to harm self Speech normal volume  Data  Labs:   Admission on 07/13/2020   Component Date Value Ref Range Status    SARS-CoV-2, NAAT 07/13/2020 Not Detected  Not Detected Final    Comment: Rapid NAAT:   Negative results should be treated as presumptive and,  if inconsistent with clinical signs and symptoms or necessary for  patient management, should be tested with an alternative molecular  assay. Negative results do not preclude SARS-CoV-2 infection and  should not be used as the sole basis for patient management decisions.   This test has been authorized by the FDA under an Emergency Use  Authorization (EUA) for use by 101* <100 mg/dL Final    VLDL Cholesterol Calculated 07/15/2020 30  Not Established mg/dL Final    Hemoglobin A1C 07/15/2020 10.8  See comment % Final    Comment: Comment:  Diagnosis of Diabetes: > or = 6.5%  Increased risk of diabetes (Prediabetes): 5.7-6.4%  Glycemic Control: Nonpregnant Adults: <7.0%                    Pregnant: <6.0%        eAG 07/15/2020 263.3  mg/dL Final    POC Glucose 07/14/2020 315* 70 - 99 mg/dl Final    Performed on 07/14/2020 ACCU-CHEK   Final    POC Glucose 07/15/2020 339* 70 - 99 mg/dl Final    Performed on 07/15/2020 ACCU-CHEK   Final    POC Glucose 07/15/2020 341* 70 - 99 mg/dl Final    Performed on 07/15/2020 ACCU-CHEK   Final    POC Glucose 07/15/2020 259* 70 - 99 mg/dl Final    Performed on 07/15/2020 ACCU-CHEK   Final    POC Glucose 07/15/2020 257* 70 - 99 mg/dl Final    Performed on 07/15/2020 ACCU-CHEK   Final    POC Glucose 07/16/2020 224* 70 - 99 mg/dl Final    Performed on 07/16/2020 ACCU-CHEK   Final    POC Glucose 07/16/2020 349* 70 - 99 mg/dl Final    Performed on 07/16/2020 ACCU-CHEK   Final    POC Glucose 07/16/2020 148* 70 - 99 mg/dl Final    Performed on 07/16/2020 ACCU-CHEK   Final    POC Glucose 07/16/2020 146* 70 - 99 mg/dl Final    Performed on 07/16/2020 ACCU-CHEK   Final    POC Glucose 07/17/2020 208* 70 - 99 mg/dl Final    Performed on 07/17/2020 ACCU-CHEK   Final    POC Glucose 07/17/2020 220* 70 - 99 mg/dl Final    Performed on 07/17/2020 ACCU-CHEK   Final    POC Glucose 07/17/2020 248* 70 - 99 mg/dl Final    Performed on 07/17/2020 ACCU-CHEK   Final    POC Glucose 07/17/2020 252* 70 - 99 mg/dl Final    Performed on 07/17/2020 ACCU-CHEK   Final    POC Glucose 07/18/2020 134* 70 - 99 mg/dl Final    Performed on 07/18/2020 ACCU-CHEK   Final    POC Glucose 07/18/2020 113* 70 - 99 mg/dl Final    Performed on 07/18/2020 ACCU-CHEK   Final    Sodium 07/18/2020 133* 136 - 145 mmol/L Final    Potassium reflex Magnesium 07/18/2020 4.0 PRN **OR** LORazepam (ATIVAN) injection 3 mg, 3 mg, Intravenous, Q1H PRN **OR** LORazepam (ATIVAN) tablet 4 mg, 4 mg, Oral, Q1H PRN **OR** LORazepam (ATIVAN) injection 4 mg, 4 mg, Intravenous, Q1H PRN  diazePAM (VALIUM) tablet 2.5 mg, 2.5 mg, Oral, BID  phenol 1.4 % mouth spray 1 spray, 1 spray, Mouth/Throat, Q2H PRN  insulin lispro (HUMALOG) injection vial 0-12 Units, 0-12 Units, Subcutaneous, TID WC  insulin lispro (HUMALOG) injection vial 0-6 Units, 0-6 Units, Subcutaneous, Nightly  acetaminophen (TYLENOL) tablet 650 mg, 650 mg, Oral, Q4H PRN  haloperidol lactate (HALDOL) injection 2 mg, 2 mg, Intramuscular, Q6H PRN **OR** haloperidol (HALDOL) tablet 2 mg, 2 mg, Oral, Q6H PRN  traZODone (DESYREL) tablet 25 mg, 25 mg, Oral, Nightly PRN  benztropine mesylate (COGENTIN) injection 1 mg, 1 mg, Intramuscular, BID PRN  magnesium hydroxide (MILK OF MAGNESIA) 400 MG/5ML suspension 30 mL, 30 mL, Oral, Daily PRN  aluminum & magnesium hydroxide-simethicone (MAALOX) 200-200-20 MG/5ML suspension 30 mL, 30 mL, Oral, Q6H PRN  mupirocin (BACTROBAN) 2 % ointment, , Topical, Daily  glucose (GLUTOSE) 40 % oral gel 15 g, 15 g, Oral, PRN  dextrose 50 % IV solution, 12.5 g, Intravenous, PRN  glucagon (rDNA) injection 1 mg, 1 mg, Intramuscular, PRN  dextrose 5 % solution, 100 mL/hr, Intravenous, PRN  aspirin EC tablet 81 mg, 81 mg, Oral, Daily  cyclobenzaprine (FLEXERIL) tablet 5 mg, 5 mg, Oral, TID PRN  cetirizine (ZYRTEC) tablet 10 mg, 10 mg, Oral, Daily  [Held by provider] lisinopril (PRINIVIL;ZESTRIL) tablet 5 mg, 5 mg, Oral, Daily  pantoprazole (PROTONIX) tablet 40 mg, 40 mg, Oral, Daily  atorvastatin (LIPITOR) tablet 10 mg, 10 mg, Oral, Daily  [Held by provider] spironolactone (ALDACTONE) tablet 12.5 mg, 12.5 mg, Oral, Daily  levothyroxine (SYNTHROID) tablet 175 mcg, 175 mcg, Oral, Daily  metFORMIN (GLUCOPHAGE) tablet 1,000 mg, 1,000 mg, Oral, BID WC    ASSESSMENT AND PLAN    Active Problems:    GEOFF (obstructive sleep apnea) Depression    Uncontrolled type 2 diabetes mellitus with hyperglycemia (HCC)    Sore throat  Resolved Problems:    * No resolved hospital problems. *       1. Patient s symptoms   are improving  2. Probable discharge is next week  3. Discharge planning is incomplete  4. Suicidal ideation is none  5. Total time with patient was 40 minutes and more than 50 % of that time was spent counseling the patient on their symptoms, treatment and expected goals.

## 2020-07-19 NOTE — PLAN OF CARE
Problem: Falls - Risk of:  Goal: Will remain free from falls  Description: Will remain free from falls  Outcome: Ongoing  Goal: Absence of physical injury  Description: Absence of physical injury  Outcome: Ongoing     Problem: Mood - Altered:  Goal: Mood stable  Description: Mood stable  Outcome: Ongoing    Alert and oriented x4 but tends to be forgetful. More alert and visible on the unit. Med compliant. Did appear to hold medications in her mouth and attempt to go back to her room immediately following this before swallowing medications. Pt was given more water to help swallow medications and writer ensured that pt had swallowed medications. Denies SI/HI/AVH. No RTIS noted. Denies any further complaints at this time.

## 2020-07-20 LAB
AMMONIA: 43 UMOL/L (ref 11–51)
ANION GAP SERPL CALCULATED.3IONS-SCNC: 13 MMOL/L (ref 3–16)
BUN BLDV-MCNC: 11 MG/DL (ref 7–20)
CALCIUM SERPL-MCNC: 9.5 MG/DL (ref 8.3–10.6)
CHLORIDE BLD-SCNC: 95 MMOL/L (ref 99–110)
CO2: 26 MMOL/L (ref 21–32)
CREAT SERPL-MCNC: 0.6 MG/DL (ref 0.6–1.2)
GFR AFRICAN AMERICAN: >60
GFR NON-AFRICAN AMERICAN: >60
GLUCOSE BLD-MCNC: 107 MG/DL (ref 70–99)
GLUCOSE BLD-MCNC: 78 MG/DL (ref 70–99)
GLUCOSE BLD-MCNC: 80 MG/DL (ref 70–99)
GLUCOSE BLD-MCNC: 87 MG/DL (ref 70–99)
GLUCOSE BLD-MCNC: 98 MG/DL (ref 70–99)
PERFORMED ON: ABNORMAL
PERFORMED ON: NORMAL
POTASSIUM SERPL-SCNC: 3.5 MMOL/L (ref 3.5–5.1)
SODIUM BLD-SCNC: 134 MMOL/L (ref 136–145)
VALPROIC ACID LEVEL: 63.7 UG/ML (ref 50–100)

## 2020-07-20 PROCEDURE — 99233 SBSQ HOSP IP/OBS HIGH 50: CPT | Performed by: PSYCHIATRY & NEUROLOGY

## 2020-07-20 PROCEDURE — 36415 COLL VENOUS BLD VENIPUNCTURE: CPT

## 2020-07-20 PROCEDURE — 1240000000 HC EMOTIONAL WELLNESS R&B

## 2020-07-20 PROCEDURE — 80048 BASIC METABOLIC PNL TOTAL CA: CPT

## 2020-07-20 PROCEDURE — 6370000000 HC RX 637 (ALT 250 FOR IP): Performed by: PHYSICIAN ASSISTANT

## 2020-07-20 PROCEDURE — 6370000000 HC RX 637 (ALT 250 FOR IP): Performed by: PSYCHIATRY & NEUROLOGY

## 2020-07-20 PROCEDURE — 99232 SBSQ HOSP IP/OBS MODERATE 35: CPT | Performed by: NURSE PRACTITIONER

## 2020-07-20 PROCEDURE — 82140 ASSAY OF AMMONIA: CPT

## 2020-07-20 PROCEDURE — 80164 ASSAY DIPROPYLACETIC ACD TOT: CPT

## 2020-07-20 PROCEDURE — 6370000000 HC RX 637 (ALT 250 FOR IP): Performed by: NURSE PRACTITIONER

## 2020-07-20 RX ORDER — CEPHALEXIN 500 MG/1
500 CAPSULE ORAL EVERY 12 HOURS SCHEDULED
Status: DISCONTINUED | OUTPATIENT
Start: 2020-07-20 | End: 2020-07-22

## 2020-07-20 RX ORDER — DIAZEPAM 5 MG/1
5 TABLET ORAL 2 TIMES DAILY
Status: DISCONTINUED | OUTPATIENT
Start: 2020-07-20 | End: 2020-07-21

## 2020-07-20 RX ORDER — DIAZEPAM 5 MG/1
5 TABLET ORAL 3 TIMES DAILY
Status: DISCONTINUED | OUTPATIENT
Start: 2020-07-20 | End: 2020-07-20

## 2020-07-20 RX ORDER — DIAZEPAM 5 MG/1
5 TABLET ORAL 2 TIMES DAILY
Status: DISCONTINUED | OUTPATIENT
Start: 2020-07-20 | End: 2020-07-20

## 2020-07-20 RX ADMIN — ARIPIPRAZOLE 10 MG: 10 TABLET ORAL at 08:03

## 2020-07-20 RX ADMIN — DIVALPROEX SODIUM 250 MG: 250 TABLET, DELAYED RELEASE ORAL at 20:44

## 2020-07-20 RX ADMIN — PANTOPRAZOLE SODIUM 40 MG: 40 TABLET, DELAYED RELEASE ORAL at 08:04

## 2020-07-20 RX ADMIN — DIVALPROEX SODIUM 250 MG: 250 TABLET, DELAYED RELEASE ORAL at 08:03

## 2020-07-20 RX ADMIN — CETIRIZINE HYDROCHLORIDE 10 MG: 10 TABLET ORAL at 08:04

## 2020-07-20 RX ADMIN — LEVOTHYROXINE SODIUM 175 MCG: 150 TABLET ORAL at 08:03

## 2020-07-20 RX ADMIN — LISINOPRIL 5 MG: 5 TABLET ORAL at 08:04

## 2020-07-20 RX ADMIN — DIAZEPAM 5 MG: 5 TABLET ORAL at 20:43

## 2020-07-20 RX ADMIN — INSULIN GLARGINE 32 UNITS: 100 INJECTION, SOLUTION SUBCUTANEOUS at 20:43

## 2020-07-20 RX ADMIN — SPIRONOLACTONE 12.5 MG: 25 TABLET ORAL at 08:02

## 2020-07-20 RX ADMIN — DIVALPROEX SODIUM 250 MG: 250 TABLET, DELAYED RELEASE ORAL at 14:38

## 2020-07-20 RX ADMIN — SERTRALINE HYDROCHLORIDE 100 MG: 100 TABLET ORAL at 08:04

## 2020-07-20 RX ADMIN — MUPIROCIN: 20 OINTMENT TOPICAL at 11:19

## 2020-07-20 RX ADMIN — ATORVASTATIN CALCIUM 10 MG: 10 TABLET, FILM COATED ORAL at 08:03

## 2020-07-20 RX ADMIN — PREDNISOLONE ACETATE 1 DROP: 10 SUSPENSION/ DROPS OPHTHALMIC at 20:43

## 2020-07-20 RX ADMIN — DIAZEPAM 2.5 MG: 5 TABLET ORAL at 08:03

## 2020-07-20 RX ADMIN — METFORMIN HYDROCHLORIDE 1000 MG: 500 TABLET ORAL at 08:03

## 2020-07-20 RX ADMIN — CEPHALEXIN 500 MG: 500 CAPSULE ORAL at 20:43

## 2020-07-20 RX ADMIN — PREDNISOLONE ACETATE 1 DROP: 10 SUSPENSION/ DROPS OPHTHALMIC at 11:19

## 2020-07-20 RX ADMIN — ASPIRIN 81 MG: 81 TABLET, COATED ORAL at 08:04

## 2020-07-20 RX ADMIN — INSULIN GLARGINE 32 UNITS: 100 INJECTION, SOLUTION SUBCUTANEOUS at 11:56

## 2020-07-20 RX ADMIN — LORAZEPAM 2 MG: 2 TABLET ORAL at 15:55

## 2020-07-20 RX ADMIN — METFORMIN HYDROCHLORIDE 1000 MG: 500 TABLET ORAL at 16:58

## 2020-07-20 ASSESSMENT — PAIN SCALES - GENERAL
PAINLEVEL_OUTOF10: 0

## 2020-07-20 NOTE — PLAN OF CARE
Pt. Is alert and oriented, labile, constricted affect, slept in this morning until 1100. Received medications, compliant with care, denies suicide ideation, moves about the unit independently. denies pain. Fingersticks have been WNL with insulin therapy. Attending afternoon groups.

## 2020-07-20 NOTE — BH NOTE
CORDELL called the pt's daughter, Yuridia Woodall, and updated her on probate court hearing tomorrow at [address] agreed to send her the link to invite her to the hearing. Yuridia Woodall demanded to know the name & contact information of the  representing her mother. CORDELL made her aware that her  is named Mindy Herbert; however she doesn't know her last name and/or contact information but she is aware that she spoke with the pt; therefore the pt can chose to provide her with that contact information. Yuridia Woodall stated that her mother will not be able to provide that information to her. CORDELL provided Melany with the phone number to [facility] court so she can follow up with them as desired. CORDELL obtained Melany's e-mail address and agreed to send her the link. CORDELL transferred call to pt.          EDDI Odell

## 2020-07-20 NOTE — PLAN OF CARE
Pt is alert and oriented X4. Answers questions appropriately regarding her admission here. She is also confused, asking writer if she was playing with her horses today. She asked if there were any meetings for tonight and signed her Three Day Letter with a notation at the bottom stating \"I am free to  and complete unfinished course work\". She later came to the common area with her C-Pap machine and stated she was ready to go. She stated that she called her daughter Treasure and Treasure had arranged an [employer] car for her. Educated that she was still in the hospital and the Dr. would want to see her tomorrow. She stated \"Oh my, we better call [employer] because they will charge $25 if I'm not there\". Writer attempted to call Melany and a voicemail was left. When speaking to pt earlier, writer asked if she was having any hallucinations. She stated that she never has. She then asked what was writer building this morning that needed a saw and hammer. Reoriented that no building was going on this morning. She still believes that she does not have hallucinations, that \"those other things are miscommunications\". She denies SI/HI. Pt laying in her bed with no linens. When asked why, she said \"I don't know\" She became irritated and was looking for the medicine in her room. She stated she has \"Perium, you know, the anti-inflammatory\". When told that we don't keep medicine in the rooms here, she stated \"Get up, I will find it myself\". Reoriented to being in the hospital and she apologized and stated she knows there are no meds in her room. CIWA 9 at this time. Ativan 1 mg admin. When Ativan taken to the room, pt was laying sideways in the bed with her C-Pap in place, it was not plugged in. Pt was surprised when writer told her that it was turned off.  Will continue to monitor

## 2020-07-20 NOTE — PLAN OF CARE
PtKerry Cervantes is at 15. Administered 2 mg ativan per protocol at 1400 will monitor and reassess effectiveness.

## 2020-07-20 NOTE — PROGRESS NOTES
Progress Note    Admit Date:  7/13/2020    Subjective:  Ms. Juan Marques c/o dysuria, urinary incontinence, urinary frequency and an area on her neck. Objective:   BP (!) 132/92   Pulse 113   Temp 98.2 °F (36.8 °C) (Oral)   Resp 15   Ht 5' 7.5\" (1.715 m)   Wt 262 lb 11.2 oz (119.2 kg)   LMP 08/01/2012   SpO2 97%   BMI 40.54 kg/m²         No intake or output data in the 24 hours ending 07/20/20 1355    Physical Exam:  Gen: No distress. Alert. Pleasant  female, morbidly obese  Eyes: PERRL. No sclera icterus. No conjunctival injection. ENT: No discharge. Pharynx clear w/o erythema or exudates  Neck:  Trachea midline. + small ingrown hair cyst  Resp: No accessory muscle use. No crackles. No wheezes. No rhonchi. On RA  CV: Regular rate. Regular rhythm. No murmur. No rub. No edema. GI: Soft, obese, Non-tender. Non-distended. Normal bowel sounds. Skin: Warm and dry. No rash on exposed extremities. No ecchymosis noted to occipital region   M/S: No cyanosis. No clubbing. Good UE and LE strength bilaterally - 5/5; mildly TTP along occiput, good ROM at neck w/ rotation  Neuro: Awake. Grossly nonfocal, CN II-XII intact, ambulates w/o assistance  Psych: Oriented x 3. Defer to psychiatry.     Scheduled Meds:   diazePAM  5 mg Oral BID    prednisoLONE acetate  1 drop Left Eye BID    sertraline  100 mg Oral Daily    divalproex  250 mg Oral TID    ARIPiprazole  10 mg Oral Daily    insulin lispro  18 Units Subcutaneous TID     insulin glargine  32 Units Subcutaneous BID    insulin lispro  0-12 Units Subcutaneous TID     insulin lispro  0-6 Units Subcutaneous Nightly    mupirocin   Topical Daily    aspirin  81 mg Oral Daily    cetirizine  10 mg Oral Daily    lisinopril  5 mg Oral Daily    pantoprazole  40 mg Oral Daily    atorvastatin  10 mg Oral Daily    spironolactone  12.5 mg Oral Daily    levothyroxine  175 mcg Oral Daily    metFORMIN  1,000 mg Oral BID        Continuous Infusions:   dextrose         PRN Meds:  sodium chloride flush, LORazepam **OR** LORazepam **OR** LORazepam **OR** LORazepam **OR** LORazepam **OR** LORazepam **OR** LORazepam **OR** LORazepam, phenol, acetaminophen, haloperidol lactate **OR** haloperidol, traZODone, benztropine mesylate, magnesium hydroxide, aluminum & magnesium hydroxide-simethicone, glucose, dextrose, glucagon (rDNA), dextrose, cyclobenzaprine    CULTURES    SARS-CoV-2: not detected  Urine: pending     RADIOLOGY  CT HEAD WO CONTRAST   Final Result   No acute intracranial abnormality. Assessment/Plan:    Depression  - cont mgmt per [facility]    Fall  - CT head non-acute  - reports 2/2 medications that \"made me dizzy\"  - c/o mild pain in occipital region  - PRN ice    Sore Throat - improved  - add PRN Phenol spray    Sinus Tachycardia - Resolved  - 2/2 benzo w/d  - monitor for now -> BP stable  - HR improved     Type II DM  - uncontrolled -> improving, Hgb A1c: 10.8  - increase Lantus to 32 units BID, cont Metformin 1000 mg, increase Humalog to 18 units TID with meals, cont med dose SSI  - POC Glucose, carb control diet  - dietitian consulted    Cyst, from ingrown hair on neck  - Added Keflex  - also has Mupirocin ointment. Dysuria, urinary incontinence, urinary frequency  - check UA  - added Keflex.      HTN  - well controlled  - cont Lisinopril, Aldactone  - monitor     Hypothyroidism  - home dose of synthroid 175 mcg      Hx of Meniere's Disease  - low Na diet     GERD  - cont Protonix     Allergies  - cont Zyrtec     HLD  - cont Lipitor     GEOFF  - cont home BiPAP     Morbid Obesity  - Body mass index is 40.54 kg/m². - Complicating assessment and treatment. Placing patient at risk for multiple co-morbidities as well as early death and contributing to the patient's presentation.   - Counseled on weight loss.      Misa Wong FNP-C 1:55 PM 7/20/2020

## 2020-07-20 NOTE — PROGRESS NOTES
Department of Psychiatry  Attending Progress Note    Admission Date:    7/13/2020    Chief complaint / Reason for Admission:  Suicidal ideation    Patient's chart was reviewed, case was discussed with nursing/OT/RT staff, and collaborated with  about the treatment plan. SUBJECTIVE:   Over last 24 hours:  Behavioral outbursts: No   Non-aggressive behavioral disturbance: Yes, increasingly paranoid  Medication compliant: No  Need for seclusion/restraints: No  Sleeping adequately:  No   Appetite adequate: Yes  Attending groups: No    Unfortunately, pt didn't demonstrate improvement over weekend. Was noted to be quite confused frequently. Making various confabulatory statements, and frequently disoriented to situation. On more than one occasion, though she was leaving the unit. Her HR has been consistently elevated over past several days, and her BP labile. This morning she was asking if it was time for [facility] party. \"  When asked directly, she was oriented to location, month, and year, but believed it was Friday. She continues to appear disheveled, and is resistant to bathing, or letting staff wash her clothes. She will ask them to do so, then when they attempt to assist she will decline. Demonstrating psychotic ambivalence. I spoke with her daughter today for 45 minutes to try to get more information and to discuss the probate process. Conversation was not particularly elucidating. Daughter seems to minimize patient's behaviors of concern and is more concerned about the affect probate court will have on patient than she is the patient's actual symptoms.       Progressing overall: Regressing  Suicidal ideation: Denies  Homicidal ideation: Denies  Medication side effects: No    ROS: Patient has new complaints: no    Current Medications Ordered:   diazePAM  5 mg Oral BID    prednisoLONE acetate  1 drop Left Eye BID    sertraline  100 mg Oral Daily    divalproex  250 mg Oral TID   [facility] ARIPiprazole  10 mg Oral Daily    insulin lispro  18 Units Subcutaneous TID     insulin glargine  32 Units Subcutaneous BID    insulin lispro  0-12 Units Subcutaneous TID     insulin lispro  0-6 Units Subcutaneous Nightly    mupirocin   Topical Daily    aspirin  81 mg Oral Daily    cetirizine  10 mg Oral Daily    lisinopril  5 mg Oral Daily    pantoprazole  40 mg Oral Daily    atorvastatin  10 mg Oral Daily    spironolactone  12.5 mg Oral Daily    levothyroxine  175 mcg Oral Daily    metFORMIN  1,000 mg Oral BID       PRN Meds: sodium chloride flush, LORazepam **OR** LORazepam **OR** LORazepam **OR** LORazepam **OR** LORazepam **OR** LORazepam **OR** LORazepam **OR** LORazepam, phenol, acetaminophen, haloperidol lactate **OR** haloperidol, traZODone, benztropine mesylate, magnesium hydroxide, aluminum & magnesium hydroxide-simethicone, glucose, dextrose, glucagon (rDNA), dextrose, cyclobenzaprine     Objective:     PE:    BP (!) 132/92   Pulse 113   Temp 98.2 °F (36.8 °C) (Oral)   Resp 15   Ht 5' 7.5\" (1.715 m)   Wt 262 lb 11.2 oz (119.2 kg)   LMP 08/01/2012   SpO2 97%   BMI 40.54 kg/m²         Mental Status Examination:    Appearance: AF, appears stated age, wearing pajamas, disheveled grooming and hygiene  Behavior/Attitude toward examiner:  Uncooperative, argumentative  Speech: Rambling, rapid, irritable  Mood:  \"I don't want to participate in this program\"  Affect:  Labile and irritable  Thought processes:  Loose and illogical  Thought Content: Chronic SI, denies active intent, denies HI, +paranoid delusions, +confabulation  Perceptions: +VH, not actively RTIS during interview  Attention: impaired  Abstraction: WNL  Cognition: Average LUDWIG, Alert and oriented to person, place, time, and situation, recall intact  Insight: Poor insight   Judgment: Impaired judgment      LAB: Reviewed labs from last 24 hours      Assessment and Plan:     Diagnoses:   Primary Psychiatric (DSM V) Diagnosis: Unspecified psychosis  Secondary Psychiatric (DSM V) Diagnoses: Unspecified mood disorder; Panic disorder without agoraphobia  Chemical Dependency Diagnoses: None  Active Medical Diagnoses:        Patient Active Problem List     Diagnosis Date Noted    Uncontrolled type 2 diabetes mellitus with hyperglycemia ([facility] [address]      Depression 07/12/2020    Panic disorder without agoraphobia      Attention deficit hyperactivity disorder (ADHD), predominantly inattentive type 05/17/2017    Nausea and vomiting 03/11/2013    Gastric banding status 12/12/2012    Morbid obesity ([facility] [address]      Sleep apnea      Chronic back pain      Meniere disorder      Meniere disease 08/29/2011    GEOFF (obstructive sleep apnea) 08/29/2011    Hypertension      Hyperlipidemia      Obesity      Type 2 diabetes, uncontrolled, 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      Tx plan: Generally: prevent self injury/aggression, stabilize mood/anxiety/psychotic/behavioral disturbance, establish/maintain aftercare, increase coping mechanisms, improve medication compliance.  All conditions present on admission are being treated while pt is hospitalized.      Legal Status: Signed voluntary 7/13. Patient now demanding discharge. Patient signed 3 day letter on 7/17. Will file for probate today.     Primary Psychiatric Issues:  1. Unspecified psychotic and mood disorders  Patient demonstrating increasingly unstable and psychotic behavior. I suspect that she may have underlying bipolar disorder and is actually in a mixed, psychotic episode. It is also possible that this is a psychotic depressive episode, but the valence of her psychosis is not classic for MDD.   Increasingly BZD withdrawal appears to be contributing.  -Started Depakote 250 mg TID on 7/16.  -Increased abilify to 10 mg daily on 7/16.  -Decreased sertraline to 100 mg daily on 7/16.  -Increase diazepam to 5 mg BID. -Continue CIWA protocol  -Head CT revealed no acute findings     Chemical Dependency Issues:  No issues     Function:  No acute functional impairement  -Falls precautions     Medical Problems:  Internal medicine has been consulted. Appreciate recs.     Code Status: Full     Disposition:    -Housing: With family  -Current outpatient follow-up: Needs connection     Estimated length of stay: 7 to 10 days     Criteria for Discharge:  Not psychotic, not homicidal, not suicidal, behavioral disturbance under control, sleeping well, mood improved/stable, eating well, aftercare arranged. Total face to face time with patient was 50 minutes and more than 50 % of that time was spent counseling the patient on their symptoms, treatment and expected goals.     Dain Pretty MD  Staff Psychiatrist

## 2020-07-20 NOTE — BH NOTE
Pts daughter Leonardo Marquez called back. Writer asked if she had ordered and [employer] car for her mother and she stated \"Just let me talk to my mother\". Phone taken to pt. After the call writer asked if she was okay and she stated, Reyessarbjit Koch thinks I was standing on the sidewalk with a [employer]-Pap machine and I told her I can't leave, the building is locked\". \" Leonardo Marquez is smothering me, she asked what I had for lunch, I told her yogurt, declan crackers and a diet Pepsi. She wanted to know how much sugar was in it\". Pt rolls eyes. When writer left room, pt asked \"What time are we leaving in the morning\". When asked where she was going, she stated \"To that other doctors office\". Reoriented that the Dr. Carson Cross see her here.

## 2020-07-20 NOTE — GROUP NOTE
Group Therapy Note    Date: 7/20/2020    Group Start Time: 3051  Group End Time: 1500  Group Topic: Recreational    [address]        Group Therapy Note    Attendees: 7    Patient's Goal: to engage in playing the game My Two Cents to promote socialization, laughter, friendly banter, reminiscing, and stress reduction. Notes: Ciro Mendoza actively engaging in playing the game My Two Cents for approximately 40 minutes. Ciro Mendoza positively socialized with peers. Ciro Mendoza was observed looking around the unit multiple times and required redirections to stay on task. Ciro Mendoza left group approximately 25 minutes early. iCro Mendoza did not return to group. Status After Intervention:  Unchanged    Participation Level:  Active Listener and Interactive    Participation Quality: Appropriate and Sharing      Speech:  normal      Thought Process/Content: Delusional      Affective Functioning: Constricted/Restricted      Mood: anxious      Level of consciousness:  Alert and Preoccupied      Response to Learning: Capable of insight, Able to change behavior and Progressing to goal      Endings: None Reported    Modes of Intervention: Education, Support, Socialization, Exploration and Activity      Discipline Responsible: Psychoeducational Specialist      Signature:  Trace Loredo [place]

## 2020-07-20 NOTE — BH NOTE
Cushing was invited and encouraged to attend [address] [facility]. Cushing accepted invite, stating \"okay. I'll come. \" Cushing did not attend group.     Martell Sacks, CTRS

## 2020-07-21 LAB
BACTERIA: ABNORMAL /HPF
BILIRUBIN URINE: ABNORMAL
BLOOD, URINE: ABNORMAL
CLARITY: CLEAR
COLOR: YELLOW
EKG ATRIAL RATE: 114 BPM
EKG DIAGNOSIS: NORMAL
EKG P AXIS: 66 DEGREES
EKG P-R INTERVAL: 178 MS
EKG Q-T INTERVAL: 330 MS
EKG QRS DURATION: 104 MS
EKG QTC CALCULATION (BAZETT): 454 MS
EKG R AXIS: 254 DEGREES
EKG T AXIS: 20 DEGREES
EKG VENTRICULAR RATE: 114 BPM
EPITHELIAL CELLS, UA: ABNORMAL /HPF (ref 0–5)
GLUCOSE BLD-MCNC: 112 MG/DL (ref 70–99)
GLUCOSE BLD-MCNC: 122 MG/DL (ref 70–99)
GLUCOSE BLD-MCNC: 162 MG/DL (ref 70–99)
GLUCOSE BLD-MCNC: 57 MG/DL (ref 70–99)
GLUCOSE BLD-MCNC: 58 MG/DL (ref 70–99)
GLUCOSE BLD-MCNC: 95 MG/DL (ref 70–99)
GLUCOSE URINE: NEGATIVE MG/DL
KETONES, URINE: ABNORMAL MG/DL
LEUKOCYTE ESTERASE, URINE: NEGATIVE
MICROSCOPIC EXAMINATION: YES
MUCUS: ABNORMAL /LPF
NITRITE, URINE: NEGATIVE
PERFORMED ON: ABNORMAL
PERFORMED ON: NORMAL
PH UA: 6 (ref 5–8)
PROTEIN UA: ABNORMAL MG/DL
RBC UA: ABNORMAL /HPF (ref 0–4)
SPECIFIC GRAVITY UA: >=1.03 (ref 1–1.03)
URINE REFLEX TO CULTURE: YES
URINE TYPE: ABNORMAL
UROBILINOGEN, URINE: 1 E.U./DL
WBC UA: ABNORMAL /HPF (ref 0–5)

## 2020-07-21 PROCEDURE — 81001 URINALYSIS AUTO W/SCOPE: CPT

## 2020-07-21 PROCEDURE — 6370000000 HC RX 637 (ALT 250 FOR IP): Performed by: PSYCHIATRY & NEUROLOGY

## 2020-07-21 PROCEDURE — 6370000000 HC RX 637 (ALT 250 FOR IP): Performed by: PHYSICIAN ASSISTANT

## 2020-07-21 PROCEDURE — 6370000000 HC RX 637 (ALT 250 FOR IP): Performed by: NURSE PRACTITIONER

## 2020-07-21 PROCEDURE — 1240000000 HC EMOTIONAL WELLNESS R&B

## 2020-07-21 PROCEDURE — 93005 ELECTROCARDIOGRAM TRACING: CPT | Performed by: PSYCHIATRY & NEUROLOGY

## 2020-07-21 PROCEDURE — 93010 ELECTROCARDIOGRAM REPORT: CPT | Performed by: INTERNAL MEDICINE

## 2020-07-21 PROCEDURE — 87086 URINE CULTURE/COLONY COUNT: CPT

## 2020-07-21 PROCEDURE — 99233 SBSQ HOSP IP/OBS HIGH 50: CPT | Performed by: PSYCHIATRY & NEUROLOGY

## 2020-07-21 RX ORDER — ARIPIPRAZOLE 15 MG/1
15 TABLET ORAL DAILY
Status: DISCONTINUED | OUTPATIENT
Start: 2020-07-21 | End: 2020-07-22

## 2020-07-21 RX ORDER — DIAZEPAM 5 MG/1
2.5 TABLET ORAL 2 TIMES DAILY
Status: DISCONTINUED | OUTPATIENT
Start: 2020-07-21 | End: 2020-07-22

## 2020-07-21 RX ADMIN — DIVALPROEX SODIUM 250 MG: 250 TABLET, DELAYED RELEASE ORAL at 22:25

## 2020-07-21 RX ADMIN — TRAZODONE HYDROCHLORIDE 25 MG: 50 TABLET ORAL at 23:36

## 2020-07-21 RX ADMIN — INSULIN GLARGINE 32 UNITS: 100 INJECTION, SOLUTION SUBCUTANEOUS at 09:24

## 2020-07-21 RX ADMIN — PANTOPRAZOLE SODIUM 40 MG: 40 TABLET, DELAYED RELEASE ORAL at 09:38

## 2020-07-21 RX ADMIN — CEPHALEXIN 500 MG: 500 CAPSULE ORAL at 09:37

## 2020-07-21 RX ADMIN — LEVOTHYROXINE SODIUM 175 MCG: 150 TABLET ORAL at 09:47

## 2020-07-21 RX ADMIN — ATORVASTATIN CALCIUM 10 MG: 10 TABLET, FILM COATED ORAL at 09:37

## 2020-07-21 RX ADMIN — CETIRIZINE HYDROCHLORIDE 10 MG: 10 TABLET ORAL at 09:37

## 2020-07-21 RX ADMIN — Medication 15 G: at 22:10

## 2020-07-21 RX ADMIN — DIVALPROEX SODIUM 250 MG: 250 TABLET, DELAYED RELEASE ORAL at 14:47

## 2020-07-21 RX ADMIN — ASPIRIN 81 MG: 81 TABLET, COATED ORAL at 09:37

## 2020-07-21 RX ADMIN — DIAZEPAM 2.5 MG: 5 TABLET ORAL at 09:39

## 2020-07-21 RX ADMIN — PREDNISOLONE ACETATE 1 DROP: 10 SUSPENSION/ DROPS OPHTHALMIC at 22:25

## 2020-07-21 RX ADMIN — LISINOPRIL 5 MG: 5 TABLET ORAL at 09:37

## 2020-07-21 RX ADMIN — Medication 15 G: at 22:24

## 2020-07-21 RX ADMIN — DIAZEPAM 2.5 MG: 5 TABLET ORAL at 22:24

## 2020-07-21 RX ADMIN — METFORMIN HYDROCHLORIDE 1000 MG: 500 TABLET ORAL at 09:38

## 2020-07-21 RX ADMIN — ARIPIPRAZOLE 15 MG: 15 TABLET ORAL at 09:39

## 2020-07-21 RX ADMIN — CEPHALEXIN 500 MG: 500 CAPSULE ORAL at 22:24

## 2020-07-21 RX ADMIN — SPIRONOLACTONE 12.5 MG: 25 TABLET ORAL at 09:38

## 2020-07-21 RX ADMIN — DIVALPROEX SODIUM 250 MG: 250 TABLET, DELAYED RELEASE ORAL at 09:38

## 2020-07-21 RX ADMIN — Medication 1 SPRAY: at 23:35

## 2020-07-21 RX ADMIN — SERTRALINE HYDROCHLORIDE 100 MG: 100 TABLET ORAL at 09:37

## 2020-07-21 RX ADMIN — METFORMIN HYDROCHLORIDE 1000 MG: 500 TABLET ORAL at 17:15

## 2020-07-21 RX ADMIN — PREDNISOLONE ACETATE 1 DROP: 10 SUSPENSION/ DROPS OPHTHALMIC at 09:37

## 2020-07-21 ASSESSMENT — PAIN SCALES - GENERAL
PAINLEVEL_OUTOF10: 3
PAINLEVEL_OUTOF10: 3
PAINLEVEL_OUTOF10: 0
PAINLEVEL_OUTOF10: 3
PAINLEVEL_OUTOF10: 0
PAINLEVEL_OUTOF10: 0
PAINLEVEL_OUTOF10: 2
PAINLEVEL_OUTOF10: 0
PAINLEVEL_OUTOF10: 0
PAINLEVEL_OUTOF10: 2
PAINLEVEL_OUTOF10: 0
PAINLEVEL_OUTOF10: 0

## 2020-07-21 NOTE — PLAN OF CARE
Pt is alert and oriented X4. She makes confused statements about leaving and what staff are doing. She is confused to the time of day at times thinking it is morning when it is night. She is preoccupied and perseverating on \"a hearing\" that she is supposed to have. She has been awake off and on. She needs reminded often which bed is hers as she often gets in the other bed in her room. She denies SI/HI/AVH. No RTIS noted. No delusional statements noted.

## 2020-07-21 NOTE — PROGRESS NOTES
Department of Psychiatry  Attending Progress Note    Admission Date:    7/13/2020    Chief complaint / Reason for Admission:  Suicidal ideation    Patient's chart was reviewed, case was discussed with nursing/OT/RT staff, and collaborated with  about the treatment plan. SUBJECTIVE:   Over last 24 hours:  Behavioral outbursts: No   Non-aggressive behavioral disturbance: Yes, increasingly paranoid  Medication compliant: No  Need for seclusion/restraints: No  Sleeping adequately:  No   Appetite adequate: Yes  Attending groups: No    Unfortunately, no improvement in symptoms. Patient remains confused and illogical. Continues to exhibit tachycardia. Repeated EKG yesterday which revealed sinus tachycardia. Ammonia level was normal and Depakote level was 63, non-toxic. Patient was probated today. She appeared to be somewhat more sedated, groggy and her gait was more unsteady.  Will back down on schedule diazepam.      Progressing overall: Regressing  Suicidal ideation: Denies  Homicidal ideation: Denies  Medication side effects: No    ROS: Patient has new complaints: no    Current Medications Ordered:   diazePAM  2.5 mg Oral BID    ARIPiprazole  15 mg Oral Daily    cephALEXin  500 mg Oral 2 times per day    prednisoLONE acetate  1 drop Left Eye BID    sertraline  100 mg Oral Daily    divalproex  250 mg Oral TID    insulin lispro  18 Units Subcutaneous TID WC    insulin glargine  32 Units Subcutaneous BID    insulin lispro  0-12 Units Subcutaneous TID WC    insulin lispro  0-6 Units Subcutaneous Nightly    mupirocin   Topical Daily    aspirin  81 mg Oral Daily    cetirizine  10 mg Oral Daily    lisinopril  5 mg Oral Daily    pantoprazole  40 mg Oral Daily    atorvastatin  10 mg Oral Daily    spironolactone  12.5 mg Oral Daily    levothyroxine  175 mcg Oral Daily    metFORMIN  1,000 mg Oral BID       PRN Meds: sodium chloride flush, LORazepam **OR** LORazepam **OR** LORazepam **OR** LORazepam **OR** LORazepam **OR** LORazepam **OR** LORazepam **OR** LORazepam, phenol, acetaminophen, haloperidol lactate **OR** haloperidol, traZODone, benztropine mesylate, magnesium hydroxide, aluminum & magnesium hydroxide-simethicone, glucose, dextrose, glucagon (rDNA), dextrose, cyclobenzaprine     Objective:     PE:    /78   Pulse 114   Temp 97.4 °F (36.3 °C) (Oral)   Resp 18   Ht 5' 7.5\" (1.715 m)   Wt 262 lb 11.2 oz (119.2 kg)   LMP 08/01/2012   SpO2 95%   BMI 40.54 kg/m²         Mental Status Examination:    Appearance: AF, appears stated age, wearing pajamas, disheveled grooming and hygiene  Behavior/Attitude toward examiner:  Uncooperative, argumentative  Speech: Rambling, rapid, irritable  Mood:  Irritable  Affect:  Labile and irritable  Thought processes:  Loose and illogical  Thought Content: Chronic SI, denies active intent, denies HI, +paranoid delusions, +confabulation  Perceptions: +VH, not actively RTIS during interview  Attention: impaired  Abstraction: WNL  Cognition: Average LUDWIG, Alert and oriented to person, place, time, and situation, recall intact  Insight: Poor insight   Judgment: Impaired judgment      LAB: Reviewed labs from last 24 hours      Assessment and Plan:     Diagnoses:   Primary Psychiatric (DSM V) Diagnosis: Unspecified psychosis  Secondary Psychiatric (DSM V) Diagnoses: Unspecified mood disorder; Panic disorder without agoraphobia  Chemical Dependency Diagnoses: None  Active Medical Diagnoses:        Patient Active Problem List     Diagnosis Date Noted    Uncontrolled type 2 diabetes mellitus with hyperglycemia ([facility] [address]      Depression 07/12/2020    Panic disorder without agoraphobia      Attention deficit hyperactivity disorder (ADHD), predominantly inattentive type 05/17/2017    Nausea and vomiting 03/11/2013    Gastric banding status 12/12/2012    Morbid obesity ([facility] [address] [facility]      Sleep apnea      Chronic back pain      Meniere disorder      Meniere disease 08/29/2011    GEOFF (obstructive sleep apnea) 08/29/2011    Hypertension      Hyperlipidemia      Obesity      Type 2 diabetes, uncontrolled, 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      Tx plan: Generally: prevent self injury/aggression, stabilize mood/anxiety/psychotic/behavioral disturbance, establish/maintain aftercare, increase coping mechanisms, improve medication compliance.  All conditions present on admission are being treated while pt is hospitalized.      Legal Status: Probated on 7/21     Primary Psychiatric Issues:  1. Unspecified psychotic and mood disorders  Patient demonstrating increasingly unstable and psychotic behavior. I suspect that she may have underlying bipolar disorder and is actually in a mixed, psychotic episode. It is also possible that this is a psychotic depressive episode, but the valence of her psychosis is not classic for MDD. Increasingly BZD withdrawal appears to be contributing.  -Started Depakote 250 mg TID on 7/16. Level 63; ammonia normal  -Increase abilify to 15 mg daily.  -Decreased sertraline to 100 mg daily on 7/16.  -Decrease diazepam back to 2.5 mg BID. -Continue CIWA protocol  -Head CT revealed no acute findings     Chemical Dependency Issues:  No issues     Function:  No acute functional impairement  -Falls precautions     Medical Problems:  Internal medicine has been consulted. Appreciate recs.     Code Status: Full     Disposition:    -Housing: With family  -Current outpatient follow-up: Needs connection     Estimated length of stay: 7 to 10 days     Criteria for Discharge:  Not psychotic, not homicidal, not suicidal, behavioral disturbance under control, sleeping well, mood improved/stable, eating well, aftercare arranged.      Total face to face time with patient was 50 minutes and more than 50 % of that time was spent counseling the patient on their symptoms, treatment and expected goals.     Jim Hansen MD  Staff Psychiatrist

## 2020-07-21 NOTE — BH NOTE
CORDELL called pt's daughter, [phone], and left a VM requesting a call back to provide update.           Cass, LUIS MANUEL-S

## 2020-07-21 NOTE — GROUP NOTE
Group Therapy Note    Date: 7/21/2020    Group Start Time: 8617  Group End Time: 1430  Group Topic: Recreational    [facility]        Group Therapy Note    Attendees: 8    Patient's Goal: to engage in [address] activity to increase socialization, improve mood, reminisce with peers, and improve overall quality of life. Notes:  Bridget Shaikh was observed sitting away from peers and observing group from across the milieu for most of group. Pt attended group for approximately 15 minutes. While in attendance, pt was redirected due to interrupting peers and inappropriate comments. Pt left group early and did not return.      Status After Intervention:  Unchanged    Participation Level: Monopolizing    Participation Quality: Inappropriate and Intrusive      Speech:  normal      Thought Process/Content: Flight of ideas      Affective Functioning: Constricted/Restricted      Mood: anxious and irritable      Level of consciousness:  Preoccupied and Inattentive      Response to Learning: Progressing to goal      Endings: None Reported    Modes of Intervention: Education, Support, Socialization, Problem-solving, Activity and Movement      Discipline Responsible: Psychoeducational Specialist      Signature:  SANTOS Ramos

## 2020-07-21 NOTE — PROGRESS NOTES
Occupational Therapy    Attempted OT tx this morning. Pt was found in bed trying to sleep. Reports she did not sleep last night and she is not interested in OT tx at this time. Will reattempt OT tx later as time allows.     Jeremy Dewitt [address], OTR/L  #[id number]

## 2020-07-21 NOTE — PLAN OF CARE
Patient took nap after breakfast. Med compliant except bactroban. Denies SI/HI/AVH. Oriented x3. CIWA 0,0. Refused lunch so insulin held. Focused on not feeling well and feeling like she had fever. Afebrile. Came out and attended group after without further c/o Will continue to monitor.

## 2020-07-22 LAB
ALBUMIN SERPL-MCNC: 4.5 G/DL (ref 3.4–5)
ALP BLD-CCNC: 55 U/L (ref 40–129)
ALT SERPL-CCNC: 26 U/L (ref 10–40)
AMMONIA: 22 UMOL/L (ref 11–51)
ANION GAP SERPL CALCULATED.3IONS-SCNC: 14 MMOL/L (ref 3–16)
AST SERPL-CCNC: 22 U/L (ref 15–37)
BASOPHILS ABSOLUTE: 0 K/UL (ref 0–0.2)
BASOPHILS RELATIVE PERCENT: 0.6 %
BILIRUB SERPL-MCNC: 0.4 MG/DL (ref 0–1)
BILIRUBIN DIRECT: <0.2 MG/DL (ref 0–0.3)
BILIRUBIN, INDIRECT: NORMAL MG/DL (ref 0–1)
BUN BLDV-MCNC: 12 MG/DL (ref 7–20)
CALCIUM SERPL-MCNC: 10 MG/DL (ref 8.3–10.6)
CHLORIDE BLD-SCNC: 98 MMOL/L (ref 99–110)
CO2: 29 MMOL/L (ref 21–32)
CREAT SERPL-MCNC: 0.7 MG/DL (ref 0.6–1.2)
EOSINOPHILS ABSOLUTE: 0.1 K/UL (ref 0–0.6)
EOSINOPHILS RELATIVE PERCENT: 3.6 %
GFR AFRICAN AMERICAN: >60
GFR NON-AFRICAN AMERICAN: >60
GLUCOSE BLD-MCNC: 113 MG/DL (ref 70–99)
GLUCOSE BLD-MCNC: 154 MG/DL (ref 70–99)
GLUCOSE BLD-MCNC: 154 MG/DL (ref 70–99)
GLUCOSE BLD-MCNC: 58 MG/DL (ref 70–99)
GLUCOSE BLD-MCNC: 85 MG/DL (ref 70–99)
HCT VFR BLD CALC: 42.6 % (ref 36–48)
HEMOGLOBIN: 14.3 G/DL (ref 12–16)
LYMPHOCYTES ABSOLUTE: 1.5 K/UL (ref 1–5.1)
LYMPHOCYTES RELATIVE PERCENT: 36.8 %
MCH RBC QN AUTO: 29.6 PG (ref 26–34)
MCHC RBC AUTO-ENTMCNC: 33.5 G/DL (ref 31–36)
MCV RBC AUTO: 88.3 FL (ref 80–100)
MONOCYTES ABSOLUTE: 0.4 K/UL (ref 0–1.3)
MONOCYTES RELATIVE PERCENT: 10.1 %
NEUTROPHILS ABSOLUTE: 2 K/UL (ref 1.7–7.7)
NEUTROPHILS RELATIVE PERCENT: 48.9 %
PDW BLD-RTO: 13.4 % (ref 12.4–15.4)
PERFORMED ON: ABNORMAL
PERFORMED ON: NORMAL
PLATELET # BLD: 204 K/UL (ref 135–450)
PMV BLD AUTO: 9 FL (ref 5–10.5)
POTASSIUM SERPL-SCNC: 4.6 MMOL/L (ref 3.5–5.1)
RBC # BLD: 4.82 M/UL (ref 4–5.2)
SODIUM BLD-SCNC: 141 MMOL/L (ref 136–145)
TOTAL PROTEIN: 7.5 G/DL (ref 6.4–8.2)
URINE CULTURE, ROUTINE: NORMAL
WBC # BLD: 4.1 K/UL (ref 4–11)

## 2020-07-22 PROCEDURE — 80048 BASIC METABOLIC PNL TOTAL CA: CPT

## 2020-07-22 PROCEDURE — 6370000000 HC RX 637 (ALT 250 FOR IP): Performed by: PSYCHIATRY & NEUROLOGY

## 2020-07-22 PROCEDURE — 99233 SBSQ HOSP IP/OBS HIGH 50: CPT | Performed by: PSYCHIATRY & NEUROLOGY

## 2020-07-22 PROCEDURE — 6370000000 HC RX 637 (ALT 250 FOR IP): Performed by: NURSE PRACTITIONER

## 2020-07-22 PROCEDURE — 80076 HEPATIC FUNCTION PANEL: CPT

## 2020-07-22 PROCEDURE — 82140 ASSAY OF AMMONIA: CPT

## 2020-07-22 PROCEDURE — 36415 COLL VENOUS BLD VENIPUNCTURE: CPT

## 2020-07-22 PROCEDURE — 85025 COMPLETE CBC W/AUTO DIFF WBC: CPT

## 2020-07-22 PROCEDURE — 1240000000 HC EMOTIONAL WELLNESS R&B

## 2020-07-22 PROCEDURE — 6370000000 HC RX 637 (ALT 250 FOR IP): Performed by: PHYSICIAN ASSISTANT

## 2020-07-22 RX ORDER — HALOPERIDOL 1 MG/1
2 TABLET ORAL 2 TIMES DAILY
Status: DISCONTINUED | OUTPATIENT
Start: 2020-07-22 | End: 2020-07-24

## 2020-07-22 RX ORDER — INSULIN GLARGINE 100 [IU]/ML
12 INJECTION, SOLUTION SUBCUTANEOUS 2 TIMES DAILY
Status: DISCONTINUED | OUTPATIENT
Start: 2020-07-22 | End: 2020-07-28

## 2020-07-22 RX ORDER — DIAZEPAM 5 MG/1
2.5 TABLET ORAL EVERY 12 HOURS PRN
Status: DISCONTINUED | OUTPATIENT
Start: 2020-07-22 | End: 2020-07-29 | Stop reason: HOSPADM

## 2020-07-22 RX ADMIN — LISINOPRIL 5 MG: 5 TABLET ORAL at 09:04

## 2020-07-22 RX ADMIN — LORAZEPAM 3 MG: 2 TABLET ORAL at 01:00

## 2020-07-22 RX ADMIN — HALOPERIDOL 2 MG: 1 TABLET ORAL at 20:45

## 2020-07-22 RX ADMIN — DIVALPROEX SODIUM 250 MG: 250 TABLET, DELAYED RELEASE ORAL at 09:03

## 2020-07-22 RX ADMIN — ASPIRIN 81 MG: 81 TABLET, COATED ORAL at 09:01

## 2020-07-22 RX ADMIN — INSULIN GLARGINE 32 UNITS: 100 INJECTION, SOLUTION SUBCUTANEOUS at 09:08

## 2020-07-22 RX ADMIN — PREDNISOLONE ACETATE 1 DROP: 10 SUSPENSION/ DROPS OPHTHALMIC at 20:45

## 2020-07-22 RX ADMIN — SERTRALINE HYDROCHLORIDE 100 MG: 100 TABLET ORAL at 09:02

## 2020-07-22 RX ADMIN — PREDNISOLONE ACETATE 1 DROP: 10 SUSPENSION/ DROPS OPHTHALMIC at 09:01

## 2020-07-22 RX ADMIN — SPIRONOLACTONE 12.5 MG: 25 TABLET ORAL at 09:01

## 2020-07-22 RX ADMIN — ATORVASTATIN CALCIUM 10 MG: 10 TABLET, FILM COATED ORAL at 09:05

## 2020-07-22 RX ADMIN — ARIPIPRAZOLE 15 MG: 15 TABLET ORAL at 09:03

## 2020-07-22 RX ADMIN — DIAZEPAM 2.5 MG: 5 TABLET ORAL at 09:05

## 2020-07-22 RX ADMIN — HALOPERIDOL 2 MG: 1 TABLET ORAL at 12:15

## 2020-07-22 RX ADMIN — LEVOTHYROXINE SODIUM 175 MCG: 150 TABLET ORAL at 09:04

## 2020-07-22 RX ADMIN — CEPHALEXIN 500 MG: 500 CAPSULE ORAL at 09:01

## 2020-07-22 RX ADMIN — METFORMIN HYDROCHLORIDE 1000 MG: 500 TABLET ORAL at 09:02

## 2020-07-22 RX ADMIN — PANTOPRAZOLE SODIUM 40 MG: 40 TABLET, DELAYED RELEASE ORAL at 09:03

## 2020-07-22 RX ADMIN — DIVALPROEX SODIUM 250 MG: 250 TABLET, DELAYED RELEASE ORAL at 20:45

## 2020-07-22 RX ADMIN — CETIRIZINE HYDROCHLORIDE 10 MG: 10 TABLET ORAL at 09:03

## 2020-07-22 RX ADMIN — DIAZEPAM 2.5 MG: 5 TABLET ORAL at 22:27

## 2020-07-22 ASSESSMENT — PAIN SCALES - GENERAL
PAINLEVEL_OUTOF10: 0
PAINLEVEL_OUTOF10: 4
PAINLEVEL_OUTOF10: 0
PAINLEVEL_OUTOF10: 2
PAINLEVEL_OUTOF10: 0
PAINLEVEL_OUTOF10: 0
PAINLEVEL_OUTOF10: 1
PAINLEVEL_OUTOF10: 2
PAINLEVEL_OUTOF10: 0
PAINLEVEL_OUTOF10: 2
PAINLEVEL_OUTOF10: 0

## 2020-07-22 NOTE — GROUP NOTE
Group Therapy Note    Date: 7/22/2020    Group Start Time: 1100  Group End Time: 1384  Group Topic: Psychoeducation    [facility]        Group Therapy Note    Attendees: 4    Patient's Goal: to engage in discussion regarding anxiety coping strategies, specifically identifying things that the pt can control in anxiety inducing situations, to increase utilization of healthy coping strategies, increase self-awareness, and decrease anxiety. Notes:  Bradford Ellis attended approximately the final 5 minutes of group. Pt required redirection due to side conversation. When redirected, pt appeared irritable and said \"Well you've been doing this for an hour and a half! \" Pt was informed group was almost over. Pt sat quietly for the remainder.      Status After Intervention:  Unchanged    Participation Level: Minimal    Participation Quality: Intrusive and Resistant      Speech:  normal      Thought Process/Content: Delusional      Affective Functioning: Constricted/Restricted      Mood: anxious and irritable      Level of consciousness:  Alert, Preoccupied and Inattentive      Response to Learning: Progressing to goal      Endings: None Reported    Modes of Intervention: Education, Support, Socialization, Clarifying, Problem-solving and Activity      Discipline Responsible: Psychoeducational Specialist      Signature:  SANTOS Westfall

## 2020-07-22 NOTE — BH NOTE
Pt slept through first part of shift. Woke around 2200. Pt pleasant, calm, cooperative and medication compliant. Pt is A+Ox4 and denies SI/HI/AVH and no RTIS observed. Pt's BS = 58 at 2200. Pt given one dose glucose gel, orange juice and peanut butter crackers. BS rechecked at 2015 and still = 57. Another dose of glucose gel given. BS rechecked at 2305 and = 97. Pt provided with additional snacks upon request and denies any other needs at this time.

## 2020-07-22 NOTE — PLAN OF CARE
Patient has been visible on unit. Med compliant except bactroban. Depakote @ 1400 held after talking to MD. Patient noted with low blood sugar when labwork obtained. NP aware and insulin adjusted. Metformin held d/t poor supper intake. Patient remains intermittently confused and weakness. Patient aware to ask staff for assistance. Picked scab off belly small amount of blood on gown. Told staff she had nosebleed,however no blood noted on hands or face and only on stomach. Patient drank ensure with encouragement Resting in bed Will monitor

## 2020-07-22 NOTE — GROUP NOTE
Group Therapy Note    Date: 7/22/2020    Group Start Time: 7675  Group End Time: 1440  Group Topic: Psychoeducation    [address]        Group Therapy Note    Attendees: 5    Patient's Goal: to create a leisure collage to promote identifying meaningful leisure outlets pt's may engage in various environments (indoors, outdoors, seasonal, weather permitting, etc.). To identify the benefits of engaging in leisure outlets daily, to promote a sense of purpose, stress reduction, reduced anxiety, mood improvement, and improved quality of life. Notes: Steph Low attended approximately the last 10 minutes of group. Steph Low appeared disorganized and confused, as evident by being observed asking peers multiple times if she were at a camp. Steph Low was briefly redirectable to place. Steph Low voiced delusions, while socializing with peers. Steph Low perseverated on informing peers she needs to call her daughter because she needs to get to the airport to fly out at 1 am. Steph Low was not redirectable to situation. Steph Low engaged in looking at [employer] for remainder of group. Steph Low did not engage in processing discussion.      Status After Intervention:  Decompensated    Participation Level: Interactive and Monopolizing    Participation Quality: Sharing and Inappropriate      Speech:  normal      Thought Process/Content: Delusional  Perseverating      Affective Functioning: Constricted/Restricted      Mood: anxious, elevated and labile      Level of consciousness:  Preoccupied      Response to Learning: Progressing to goal      Endings: None Reported    Modes of Intervention: Education, Support, Socialization, Exploration, Clarifying, Problem-solving and Activity      Discipline Responsible: Psychoeducational Specialist      Signature:  Jun Dao, CTRS

## 2020-07-22 NOTE — GROUP NOTE
Group Therapy Note    Date: 7/22/2020    Group Start Time: 4293  Group End Time: 1100  Group Topic: [facility]        Group Therapy Note    Attendees: 5    Patient's Goal: to engage in Mood Elevation intervention to improve mood, discuss music as a coping skills, and improve overall quality of life. Notes:  Ciro Mendoza attended group for approximately 10 minutes. Pt was observed sitting around the milieu for the majority of group and did not participate. At one time, pt interrupted group and attempted to ask peers \"Where is the doctor? I need to leave to get on a plane at 1 o'clock. \" Ciro Mendoza was redirected for interrupting group and went to sit in a separate area.     Status After Intervention:  Unchanged    Participation Level: Monopolizing    Participation Quality: Inappropriate and Intrusive      Speech:  normal      Thought Process/Content: Delusional      Affective Functioning: Constricted/Restricted      Mood: anxious, depressed and irritable      Level of consciousness:  Alert and Preoccupied      Response to Learning: Progressing to goal      Endings: None Reported    Modes of Intervention: Education, Support, Socialization, Problem-solving, Activity and Media      Discipline Responsible: Psychoeducational Specialist      Signature:  SANTOS Castañeda

## 2020-07-22 NOTE — PROGRESS NOTES
Department of Psychiatry  Attending Progress Note    Admission Date:    7/13/2020    Chief complaint / Reason for Admission:  Suicidal ideation    Patient's chart was reviewed, case was discussed with nursing/OT/RT staff, and collaborated with  about the treatment plan. SUBJECTIVE:   Over last 24 hours:  Behavioral outbursts: No   Non-aggressive behavioral disturbance: Yes  Medication compliant: No  Need for seclusion/restraints: No  Sleeping adequately:  Yes  Appetite adequate: Yes  Attending groups: No    Pt increasingly confused and disoriented. Symptoms fluctuate throughout the day. Notably, despite receive BZD replacement over the past several days, pt's HR is not improving, nor is her mentation. She remains persistently tachy to the one teens. In terms of delirium, if anything she is getting more confused. This morning she was under the impression that she was due to catch a plane and needed to get to the airport. She also appears increasingly unsteady on her feet, and at times appears sedated, and groggy.     Progressing overall: Regressing  Suicidal ideation: Denies  Homicidal ideation: Denies  Medication side effects: No    ROS: Patient has new complaints: no    Current Medications Ordered:   haloperidol  2 mg Oral BID    diazePAM  2.5 mg Oral BID    cephALEXin  500 mg Oral 2 times per day    prednisoLONE acetate  1 drop Left Eye BID    sertraline  100 mg Oral Daily    divalproex  250 mg Oral TID    insulin lispro  18 Units Subcutaneous TID WC    insulin glargine  32 Units Subcutaneous BID    insulin lispro  0-12 Units Subcutaneous TID WC    insulin lispro  0-6 Units Subcutaneous Nightly    mupirocin   Topical Daily    aspirin  81 mg Oral Daily    cetirizine  10 mg Oral Daily    lisinopril  5 mg Oral Daily    pantoprazole  40 mg Oral Daily    atorvastatin  10 mg Oral Daily    spironolactone  12.5 mg Oral Daily    levothyroxine  175 mcg Oral Daily    metFORMIN 1,000 mg Oral BID WC      PRN Meds: sodium chloride flush, phenol, acetaminophen, haloperidol lactate **OR** haloperidol, traZODone, benztropine mesylate, magnesium hydroxide, aluminum & magnesium hydroxide-simethicone, glucose, dextrose, glucagon (rDNA), dextrose, cyclobenzaprine     Objective:     PE:    /71   Pulse 116   Temp 97.8 °F (36.6 °C) (Oral)   Resp 16   Ht 5' 7.5\" (1.715 m)   Wt 262 lb 11.2 oz (119.2 kg)   LMP 08/01/2012   SpO2 99%   BMI 40.54 kg/m²         Mental Status Examination:    Appearance: AF, appears stated age, wearing hospital gown, disheveled grooming and hygiene  Behavior/Attitude toward examiner:  Minimally cooperative  Speech: Rambling, but less pressured, poor articulation at times  Mood: \"I need to get out of here to go to the airport\"  Affect: Perplexed  Thought processes:  Loose and illogical  Thought Content: Chronic SI, denies active intent, denies HI, more confabulation than delusions today  Perceptions: +VH, not actively RTIS during interview  Attention: impaired  Abstraction: WNL  Cognition: Average LUDWIG, Alert and oriented to person, place, time, and situation, recall intact  Insight: Poor insight   Judgment: Impaired judgment      LAB: Reviewed labs from last 24 hours      Assessment and Plan:     Diagnoses:   Primary Psychiatric (DSM V) Diagnosis: Unspecified psychosis  Secondary Psychiatric (DSM V) Diagnoses: Unspecified mood disorder; Panic disorder without agoraphobia  Chemical Dependency Diagnoses: None  Active Medical Diagnoses:        Patient Active Problem List     Diagnosis Date Noted    Uncontrolled type 2 diabetes mellitus with hyperglycemia ([facility] [address]      Depression 07/12/2020    Panic disorder without agoraphobia      Attention deficit hyperactivity disorder (ADHD), predominantly inattentive type 05/17/2017    Nausea and vomiting 03/11/2013    Gastric banding status 12/12/2012    Morbid obesity ([facility] [address]      Sleep apnea      Chronic back pain      Meniere disorder      Meniere disease 08/29/2011    GEOFF (obstructive sleep apnea) 08/29/2011    Hypertension      Hyperlipidemia      Obesity      Type 2 diabetes, uncontrolled, 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      Tx plan: Generally: prevent self injury/aggression, stabilize mood/anxiety/psychotic/behavioral disturbance, establish/maintain aftercare, increase coping mechanisms, improve medication compliance.  All conditions present on admission are being treated while pt is hospitalized.      Legal Status: Probated on 7/21     Primary Psychiatric Issues:  1. Unspecified psychotic and mood disorders  Pt worsening with time. Treatment for presumed BZD withdrawal is not improving symptoms. If anything, patient is worsening. I am beginning to wonder if patient may not be in withdrawal and the use of BZDs per CIWA protocol is actually contributing to worsening delirium.  -Started Depakote 250 mg TID on 7/16. Level 63; ammonia normal  -D/C abilify. This agent seems to be having no impact on either patient's psychosis or her delirium.  -Start haldol 2 mg BID  -Decreased sertraline to 100 mg daily on 7/16.  -Change diazepam to 2.5 mg BID prn.  -D/C CIWA protocol and prn lorazepam.     Chemical Dependency Issues:  No issues     Function:  No acute functional impairement  -Falls precautions     Medical Problems:  Internal medicine has been consulted. Appreciate recs.     Code Status: Full     Disposition:    -Housing: With family  -Current outpatient follow-up: Needs connection     Estimated length of stay: 7 to 10 days     Criteria for Discharge:  Not psychotic, not homicidal, not suicidal, behavioral disturbance under control, sleeping well, mood improved/stable, eating well, aftercare arranged.      Total face to face time with patient was 50 minutes and more than 50 % of that time was spent counseling the patient on their symptoms, treatment and expected goals.     La Pandya MD  Staff Psychiatrist

## 2020-07-22 NOTE — BH NOTE
Pt at nursing desk, c/o of anxiety and feeling panic. CIWA = 16.  3mg Ativan PO PRN per CIWA order given. Will monitor.

## 2020-07-23 LAB
GLUCOSE BLD-MCNC: 109 MG/DL (ref 70–99)
GLUCOSE BLD-MCNC: 166 MG/DL (ref 70–99)
GLUCOSE BLD-MCNC: 189 MG/DL (ref 70–99)
GLUCOSE BLD-MCNC: 299 MG/DL (ref 70–99)
PERFORMED ON: ABNORMAL

## 2020-07-23 PROCEDURE — 6370000000 HC RX 637 (ALT 250 FOR IP): Performed by: PHYSICIAN ASSISTANT

## 2020-07-23 PROCEDURE — 97530 THERAPEUTIC ACTIVITIES: CPT

## 2020-07-23 PROCEDURE — 99231 SBSQ HOSP IP/OBS SF/LOW 25: CPT | Performed by: NURSE PRACTITIONER

## 2020-07-23 PROCEDURE — 6370000000 HC RX 637 (ALT 250 FOR IP): Performed by: PSYCHIATRY & NEUROLOGY

## 2020-07-23 PROCEDURE — 99233 SBSQ HOSP IP/OBS HIGH 50: CPT | Performed by: PSYCHIATRY & NEUROLOGY

## 2020-07-23 PROCEDURE — 1240000000 HC EMOTIONAL WELLNESS R&B

## 2020-07-23 RX ADMIN — HALOPERIDOL 2 MG: 1 TABLET ORAL at 09:37

## 2020-07-23 RX ADMIN — CETIRIZINE HYDROCHLORIDE 10 MG: 10 TABLET ORAL at 09:36

## 2020-07-23 RX ADMIN — DIAZEPAM 2.5 MG: 5 TABLET ORAL at 20:11

## 2020-07-23 RX ADMIN — HALOPERIDOL 2 MG: 1 TABLET ORAL at 20:11

## 2020-07-23 RX ADMIN — DIVALPROEX SODIUM 250 MG: 250 TABLET, DELAYED RELEASE ORAL at 20:10

## 2020-07-23 RX ADMIN — METFORMIN HYDROCHLORIDE 1000 MG: 500 TABLET ORAL at 09:36

## 2020-07-23 RX ADMIN — DIVALPROEX SODIUM 250 MG: 250 TABLET, DELAYED RELEASE ORAL at 09:37

## 2020-07-23 RX ADMIN — LEVOTHYROXINE SODIUM 175 MCG: 150 TABLET ORAL at 09:36

## 2020-07-23 RX ADMIN — SPIRONOLACTONE 12.5 MG: 25 TABLET ORAL at 09:36

## 2020-07-23 RX ADMIN — ATORVASTATIN CALCIUM 10 MG: 10 TABLET, FILM COATED ORAL at 09:36

## 2020-07-23 RX ADMIN — SERTRALINE HYDROCHLORIDE 100 MG: 100 TABLET ORAL at 09:37

## 2020-07-23 RX ADMIN — ASPIRIN 81 MG: 81 TABLET, COATED ORAL at 09:36

## 2020-07-23 RX ADMIN — LISINOPRIL 5 MG: 5 TABLET ORAL at 09:37

## 2020-07-23 RX ADMIN — PANTOPRAZOLE SODIUM 40 MG: 40 TABLET, DELAYED RELEASE ORAL at 09:36

## 2020-07-23 RX ADMIN — PREDNISOLONE ACETATE 1 DROP: 10 SUSPENSION/ DROPS OPHTHALMIC at 20:11

## 2020-07-23 RX ADMIN — INSULIN GLARGINE 12 UNITS: 100 INJECTION, SOLUTION SUBCUTANEOUS at 20:11

## 2020-07-23 RX ADMIN — INSULIN GLARGINE 12 UNITS: 100 INJECTION, SOLUTION SUBCUTANEOUS at 08:40

## 2020-07-23 ASSESSMENT — PAIN SCALES - GENERAL: PAINLEVEL_OUTOF10: 0

## 2020-07-23 NOTE — PROGRESS NOTES
Progress Note    Admit Date:  7/13/2020    Subjective:  Ms. Sydnee Lock seen resting in bed. Denies any complaints. Objective:   /78   Pulse 113   Temp 97.3 °F (36.3 °C) (Oral)   Resp 16   Ht 5' 7.5\" (1.715 m)   Wt 262 lb 11.2 oz (119.2 kg)   LMP 08/01/2012   SpO2 94%   BMI 40.54 kg/m²           Intake/Output Summary (Last 24 hours) at 7/23/2020 1242  Last data filed at 7/22/2020 1727  Gross per 24 hour   Intake 480 ml   Output --   Net 480 ml       Physical Exam:  Gen: No distress. Alert. Pleasant  female, morbidly obese  Eyes: PERRL. No sclera icterus. No conjunctival injection. ENT: No discharge. Pharynx clear w/o erythema or exudates  Neck:  Trachea midline. + small ingrown hair (improved)  Resp: No accessory muscle use. No crackles. No wheezes. No rhonchi. On RA  CV: Regular rate. Regular rhythm. No murmur. No rub. No edema. GI: Soft, obese, Non-tender. Non-distended. Normal bowel sounds. Skin: Warm and dry. No rash on exposed extremities. No ecchymosis noted to occipital region   M/S: No cyanosis. No clubbing. Good UE and LE strength bilaterally - 5/5; mildly TTP along occiput, good ROM at neck w/ rotation  Neuro: Awake. Grossly nonfocal, CN II-XII intact, ambulates w/o assistance  Psych: Oriented x 3. Defer to psychiatry.     Scheduled Meds:   haloperidol  2 mg Oral BID    insulin glargine  12 Units Subcutaneous BID    prednisoLONE acetate  1 drop Left Eye BID    sertraline  100 mg Oral Daily    divalproex  250 mg Oral TID    insulin lispro  0-12 Units Subcutaneous TID WC    insulin lispro  0-6 Units Subcutaneous Nightly    aspirin  81 mg Oral Daily    cetirizine  10 mg Oral Daily    lisinopril  5 mg Oral Daily    pantoprazole  40 mg Oral Daily    atorvastatin  10 mg Oral Daily    spironolactone  12.5 mg Oral Daily    levothyroxine  175 mcg Oral Daily    metFORMIN  1,000 mg Oral BID WC       Continuous Infusions:   dextrose         PRN Meds:  diazePAM, mupirocin, sodium chloride flush, phenol, acetaminophen, haloperidol lactate **OR** haloperidol, traZODone, benztropine mesylate, magnesium hydroxide, aluminum & magnesium hydroxide-simethicone, glucose, dextrose, glucagon (rDNA), dextrose, cyclobenzaprine    CULTURES    SARS-CoV-2: not detected  Urine: pending     RADIOLOGY  CT HEAD WO CONTRAST   Final Result   No acute intracranial abnormality. Assessment/Plan:    Depression  - cont mgmt per [facility]    Fall  - CT head non-acute  - reports 2/2 medications that \"made me dizzy\"  - c/o mild pain in occipital region  - PRN ice    Sore Throat - improved  - add PRN Phenol spray    Sinus Tachycardia - Resolved  - 2/2 benzo w/d  - monitor for now -> BP stable  - HR improved     Type II DM  - uncontrolled -> improving, Hgb A1c: 10.8  - increase Lantus to 32 units BID, cont Metformin 1000 mg, increase Humalog to 18 units TID with meals, cont med dose SSI  - POC Glucose, carb control diet  - dietitian consulted    Cyst, from ingrown hair on neck  - Added Keflex (discontinued)  - use Mupirocin ointment. - area is improved. Dysuria, urinary incontinence, urinary frequency  - checked UA: NGTD  - added Keflex. Antibiotic discontinued     HTN  - well controlled  - cont Lisinopril, Aldactone  - monitor     Hypothyroidism  - home dose of synthroid 175 mcg      Hx of Meniere's Disease  - low Na diet     GERD  - cont Protonix     Allergies  - cont Zyrtec     HLD  - cont Lipitor     GEOFF  - cont home BiPAP     Morbid Obesity  - Body mass index is 40.54 kg/m². - Complicating assessment and treatment. Placing patient at risk for multiple co-morbidities as well as early death and contributing to the patient's presentation.   - Counseled on weight loss.      Sunil Samaniego FNP-C 12:42 PM 7/23/2020

## 2020-07-23 NOTE — BH NOTE
Writer spoke with pt's daughter who expressed concern over mother's anxiety, \"paranoia\" and difficulty sleeping. Requested pt receive Ambien. No Ambien ordered. Given PRN Valium 2.5 mg PO and escorted to bed. Will continue to monitor.

## 2020-07-23 NOTE — PROGRESS NOTES
Department of Psychiatry  Attending Progress Note    Admission Date:    7/13/2020    Chief complaint / Reason for Admission:  Suicidal ideation    Patient's chart was reviewed, case was discussed with nursing/OT/RT staff, and collaborated with  about the treatment plan. SUBJECTIVE:   Over last 24 hours:  Behavioral outbursts: No   Non-aggressive behavioral disturbance: Yes  Medication compliant: Partially  Need for seclusion/restraints: No  Sleeping adequately:  Yes  Appetite adequate: Yes  Attending groups: No    Patient continues to present with signs and symptoms of delirium. She is disoriented, confabulating, and has been wandering into peers' rooms. She called 911 this morning from unit phone asking them to help her get out of the hospital.  Has been refusing coverage insulin, but taking other oral medications. With that being said, she did appear less sedated and groggy on my assessment today, and her thought process, while overtly illogical, was somewhat less disorganized than the past several days. She perseverated today on her desire to leave the hospital, and was paranoid throughout. Believes she is being kept here as part of \"a game\" and being \"manipulated. \"  Attempted to reassure patient that our goal has always been and remains only to help her with her symptoms.     Progressing overall: Not progressing  Suicidal ideation: Denies  Homicidal ideation: Denies  Medication side effects: No    ROS: Patient has new complaints: no    Current Medications Ordered:   haloperidol  2 mg Oral BID    insulin glargine  12 Units Subcutaneous BID    prednisoLONE acetate  1 drop Left Eye BID    sertraline  100 mg Oral Daily    divalproex  250 mg Oral TID    insulin lispro  0-12 Units Subcutaneous TID WC    insulin lispro  0-6 Units Subcutaneous Nightly    aspirin  81 mg Oral Daily    cetirizine  10 mg Oral Daily    lisinopril  5 mg Oral Daily    pantoprazole  40 mg Oral Daily    atorvastatin  10 mg Oral Daily    spironolactone  12.5 mg Oral Daily    levothyroxine  175 mcg Oral Daily    metFORMIN  1,000 mg Oral BID WC      PRN Meds: diazePAM, mupirocin, sodium chloride flush, phenol, acetaminophen, haloperidol lactate **OR** haloperidol, traZODone, benztropine mesylate, magnesium hydroxide, aluminum & magnesium hydroxide-simethicone, glucose, dextrose, glucagon (rDNA), dextrose, cyclobenzaprine     Objective:     PE:    /78   Pulse 113   Temp 97.3 °F (36.3 °C) (Oral)   Resp 16   Ht 5' 7.5\" (1.715 m)   Wt 262 lb 11.2 oz (119.2 kg)   LMP 08/01/2012   SpO2 94%   BMI 40.54 kg/m²         Mental Status Examination:    Appearance: AF, appears stated age, wearing own clothing, disheveled grooming and hygiene  Behavior/Attitude toward examiner:  Argumentative, inattentive  Speech: Non-pressured, somewhat soft, improved articulation  Mood: \"I'm tired of these games\"  Affect: Irritable and guarded  Thought processes: Perseverative and illogical  Thought Content: Denies SI, denies HI, +paranioa about hospitalization, +confabulation  Perceptions: +VH, not actively RTIS during interview  Attention: impaired  Abstraction: impaired  Cognition: Average LUDWIG, Alert and oriented to person, place, but no longer to time, or situation, immediate and recent recall impaired  Insight: Poor insight   Judgment: Impaired judgment      LAB: Reviewed labs from last 24 hours      Assessment and Plan:     Diagnoses:   Primary Psychiatric (DSM V) Diagnosis: Unspecified psychosis  Secondary Psychiatric (DSM V) Diagnoses: Unspecified mood disorder; Panic disorder without agoraphobia  Chemical Dependency Diagnoses: None  Active Medical Diagnoses:        Patient Active Problem List     Diagnosis Date Noted    Uncontrolled type 2 diabetes mellitus with hyperglycemia ([facility] [address]      Depression 07/12/2020    Panic disorder without agoraphobia      Attention deficit hyperactivity disorder (ADHD), predominantly inattentive type 05/17/2017    Nausea and vomiting 03/11/2013    Gastric banding status 12/12/2012    Morbid obesity (HCC)      Sleep apnea      Chronic back pain      Meniere disorder      Meniere disease 08/29/2011    GEOFF (obstructive sleep apnea) 08/29/2011    Hypertension      Hyperlipidemia      Obesity      Type 2 diabetes, uncontrolled, 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      Tx plan: Generally: prevent self injury/aggression, stabilize mood/anxiety/psychotic/behavioral disturbance, establish/maintain aftercare, increase coping mechanisms, improve medication compliance.  All conditions present on admission are being treated while pt is hospitalized.      Legal Status: Probated on 7/21     Primary Psychiatric Issues:  1. Unspecified psychotic and mood disorders  Pt has worsened with time. Treatment for presumed BZD withdrawal did not improve symptoms. If anything, patient worsened. The use of BZDs per CIWA protocol appears actually to have worsened delirium.  -Started Depakote 250 mg TID on 7/16. Level 63; ammonia normal x2  -D/C'd abilify. This agent seems to have had no impact on either patient's psychosis or her delirium.  -Started haldol 2 mg BID on 7/22  -Decreased sertraline to 100 mg daily on 7/16.  -Changed diazepam to 2.5 mg BID prn on 7/22. -D/C'd CIWA protocol and prn lorazepam on 7/22.     Chemical Dependency Issues:  No issues     Function:  No acute functional impairement  -Falls precautions     Medical Problems:  Internal medicine has been consulted.   Appreciate recs.     Code Status: Full     Disposition:    -Housing: With family  -Current outpatient follow-up: Needs connection     Estimated length of stay: 14 to 21 days     Criteria for Discharge:  Not psychotic, not homicidal, not suicidal, behavioral disturbance under control, sleeping well, mood improved/stable, eating well, aftercare arranged. Total face to face time with patient was 35 minutes and more than 50 % of that time was spent counseling the patient on their symptoms, treatment and expected goals.     Karyn Whitmore MD  Staff Psychiatrist

## 2020-07-23 NOTE — PLAN OF CARE
Pt. Is experiencing increased confusion, forgetfulness, irritation. Refused shower, received medications whole, good appetite, wandering into others rooms, redirected, called 911 to go home, flight of ideas and disorganized speech.

## 2020-07-23 NOTE — BH NOTE
Pt. Refusing to take insulin for a BG of 299 mg/dl, writer made several attempts explaining the importance but patient became irritable and walked away.

## 2020-07-23 NOTE — PLAN OF CARE
Roseanne Agee has been intermittently visible wandering around the unit. Oriented only to person and year. Stated month was \"Feb, no March, no April. \" Olivia Born she is at the \"diner downstairs from the apartment. \" Speech is loose and thoughts disorganized. Disheveled. Gait is steady. She spoke with her daughter Yuridia Woodall on the telephone. Appears suspicious at times, whispering while on telephone for example. Asking for the address here and commented that \"you have a beautiful home. \" She denies SI/HI/AVH. FSBS = 154 tonight. Refused Insulins. Accepted other meds whole with specific direction to place them in her mouth and take a sip of water. Up wandering around at present. Requiring redirection at times from going into other patient's rooms. Will continue to monitor.

## 2020-07-23 NOTE — BH NOTE
Cheryle Lathe was verbally invited and encouraged to attend [address] [place] [address]. Pt appeared to be resting in bed and did not rouse at invite. Pt did not attend.     SANTOS Webb

## 2020-07-23 NOTE — PROGRESS NOTES
No    Assessment: Pt tolerated OT tx session fairly  well today. She had decreased orientation, and increased confusion. During discussion of coping skills, pt was distracted when she heard the tv going in the other room, stood up, stating \"I need to see what's going on out there. \"  Session was then ended. Pt should continue to benefit from skilled OT tx to maximize independence so that she may eventually return home with the least amount of assistance. GOALS  To be met in 3 Visits:  1. Pt. To verbalize 3 coping skills. (Progressing-continue 7/23/20)  2. Pt to complete ACLS/MOCA. (Goal met 7/16/20)     To be met in 5 Visits:  1. Pt. To complete interest checklist.  (Goal met 7/14/20)  3. Pt. To verbalize understanding of sleep hygiene education. (Goal met 7/16/20)  2. Pt. To complete daily schedule of healthy activities/routines with minimal assist.  3. Pt. To complete wellness plan. 4. Pt.  To complete 1 SMART long term goal and 2 SMART short term goals with minimal assist.         Plan: cont with [facility] MS, OTR/L  #[id number]        If patient discharges from this facility prior to next visit, this note will serve as the Discharge Summary

## 2020-07-24 LAB
GLUCOSE BLD-MCNC: 135 MG/DL (ref 70–99)
GLUCOSE BLD-MCNC: 217 MG/DL (ref 70–99)
GLUCOSE BLD-MCNC: 250 MG/DL (ref 70–99)
GLUCOSE BLD-MCNC: 272 MG/DL (ref 70–99)
PERFORMED ON: ABNORMAL

## 2020-07-24 PROCEDURE — 6370000000 HC RX 637 (ALT 250 FOR IP): Performed by: PSYCHIATRY & NEUROLOGY

## 2020-07-24 PROCEDURE — 1240000000 HC EMOTIONAL WELLNESS R&B

## 2020-07-24 PROCEDURE — 6370000000 HC RX 637 (ALT 250 FOR IP): Performed by: PHYSICIAN ASSISTANT

## 2020-07-24 PROCEDURE — 99233 SBSQ HOSP IP/OBS HIGH 50: CPT | Performed by: PSYCHIATRY & NEUROLOGY

## 2020-07-24 RX ORDER — HALOPERIDOL 1 MG/1
2 TABLET ORAL 3 TIMES DAILY
Status: DISCONTINUED | OUTPATIENT
Start: 2020-07-24 | End: 2020-07-24

## 2020-07-24 RX ORDER — DIVALPROEX SODIUM 250 MG/1
250 TABLET, DELAYED RELEASE ORAL EVERY 12 HOURS SCHEDULED
Status: DISCONTINUED | OUTPATIENT
Start: 2020-07-24 | End: 2020-07-29 | Stop reason: HOSPADM

## 2020-07-24 RX ORDER — HALOPERIDOL 1 MG/1
2 TABLET ORAL 2 TIMES DAILY
Status: DISCONTINUED | OUTPATIENT
Start: 2020-07-24 | End: 2020-07-25

## 2020-07-24 RX ADMIN — LISINOPRIL 5 MG: 5 TABLET ORAL at 13:00

## 2020-07-24 RX ADMIN — DIVALPROEX SODIUM 250 MG: 250 TABLET, DELAYED RELEASE ORAL at 13:00

## 2020-07-24 RX ADMIN — ASPIRIN 81 MG: 81 TABLET, COATED ORAL at 13:00

## 2020-07-24 RX ADMIN — PANTOPRAZOLE SODIUM 40 MG: 40 TABLET, DELAYED RELEASE ORAL at 13:00

## 2020-07-24 RX ADMIN — LEVOTHYROXINE SODIUM 175 MCG: 150 TABLET ORAL at 13:00

## 2020-07-24 RX ADMIN — SERTRALINE HYDROCHLORIDE 100 MG: 100 TABLET ORAL at 13:00

## 2020-07-24 RX ADMIN — HALOPERIDOL 2 MG: 1 TABLET ORAL at 13:00

## 2020-07-24 RX ADMIN — SPIRONOLACTONE 12.5 MG: 25 TABLET ORAL at 13:00

## 2020-07-24 RX ADMIN — HALOPERIDOL 2 MG: 1 TABLET ORAL at 20:16

## 2020-07-24 RX ADMIN — INSULIN GLARGINE 12 UNITS: 100 INJECTION, SOLUTION SUBCUTANEOUS at 20:16

## 2020-07-24 RX ADMIN — METFORMIN HYDROCHLORIDE 1000 MG: 500 TABLET ORAL at 13:00

## 2020-07-24 RX ADMIN — ATORVASTATIN CALCIUM 10 MG: 10 TABLET, FILM COATED ORAL at 13:00

## 2020-07-24 RX ADMIN — METFORMIN HYDROCHLORIDE 1000 MG: 500 TABLET ORAL at 16:46

## 2020-07-24 RX ADMIN — PREDNISOLONE ACETATE 1 DROP: 10 SUSPENSION/ DROPS OPHTHALMIC at 20:16

## 2020-07-24 RX ADMIN — CETIRIZINE HYDROCHLORIDE 10 MG: 10 TABLET ORAL at 13:00

## 2020-07-24 ASSESSMENT — PAIN SCALES - GENERAL
PAINLEVEL_OUTOF10: 0

## 2020-07-24 NOTE — PLAN OF CARE
Gerri Valdez has been visible on the unit. Thoughts are a little clearer tonight. Was able to recall the name of her eye drops for example. Remains paranoid, especially regarding meds. Did take with encouragement and reassurance. FSBS = 189. Took HS snack. She has spoken with her daughter on the telephone. No further attempts to call 911. Denies SI/HI/AVH. Went to bed after HS meds. Awake but resting in bed at present. Will continue to monitor.

## 2020-07-24 NOTE — PROGRESS NOTES
Department of Psychiatry  Attending Progress Note    Admission Date:    7/13/2020    Chief complaint / Reason for Admission:  Suicidal ideation    Patient's chart was reviewed, case was discussed with nursing/OT/RT staff, and collaborated with  about the treatment plan. SUBJECTIVE:   Over last 24 hours:  Behavioral outbursts: No   Non-aggressive behavioral disturbance: Yes  Medication compliant: Partially  Need for seclusion/restraints: No  Sleeping adequately:  Yes  Appetite adequate: Yes  Attending groups: No    Pt remains delirious, but looks ever so slightly improved. She appeared more alert, and while she continues to exhibit tangentiality, she was not grossly disorganized. She was not observed to be hallucinating last night or so far this morning. Today, while she refused all her AM medications, she did not present as grossly paranoid. Rather, she stated was fixated on not wanting to be in the hospital, feeling as though she and this provider \"don't have a good fit,\" and complaining that her medication made her feel \"spacey. \"  She particularly complained about a blue capsule, which may be divalproex. Moreover, she states that she spoke with her daughter and her daughter encouraged her not to take her medications, as she felt they were making the patient worse. Pt's RN confirmed with pt's daughter after our meeting that she had indeed encouraged patient not to take her medications during a phone call with patient. Moreover, staff notes that last evening, daughter called the unit at 2 in the morning and demanded staff wake patient up so that they could talk. Finally, staff noted during treatment team that pt has been observed on more than one occasion to become agitated during her phone calls with daughter; they will frequently yell at each other and on several occasions, patient has abruptly hung up on daughter only to call back immediately.   The phone in general has been problematic for patient as she tends to belief it to be her personal phone and resist staff instructions not to take it to her room.     Progressing overall: Minimal progress  Suicidal ideation: Denies  Homicidal ideation: Denies  Medication side effects: No    ROS: Patient has new complaints: no    Current Medications Ordered:   haloperidol  2 mg Oral BID    divalproex  250 mg Oral 2 times per day    insulin glargine  12 Units Subcutaneous BID    prednisoLONE acetate  1 drop Left Eye BID    sertraline  100 mg Oral Daily    insulin lispro  0-12 Units Subcutaneous TID WC    insulin lispro  0-6 Units Subcutaneous Nightly    aspirin  81 mg Oral Daily    cetirizine  10 mg Oral Daily    lisinopril  5 mg Oral Daily    pantoprazole  40 mg Oral Daily    atorvastatin  10 mg Oral Daily    spironolactone  12.5 mg Oral Daily    levothyroxine  175 mcg Oral Daily    metFORMIN  1,000 mg Oral BID WC      PRN Meds: diazePAM, mupirocin, sodium chloride flush, phenol, acetaminophen, haloperidol lactate **OR** haloperidol, traZODone, benztropine mesylate, magnesium hydroxide, aluminum & magnesium hydroxide-simethicone, glucose, dextrose, glucagon (rDNA), dextrose, cyclobenzaprine     Objective:     PE:    /86   Pulse 98   Temp 98.3 °F (36.8 °C) (Axillary)   Resp 16   Ht 5' 7.5\" (1.715 m)   Wt 262 lb 11.2 oz (119.2 kg)   LMP 08/01/2012   SpO2 99%   BMI 40.54 kg/m²         Mental Status Examination:    Appearance: AF, appears stated age, wearing own clothing, disheveled grooming and hygiene  Behavior/Attitude toward examiner:  Remains generally argumentative, but slightly more cooperative  Speech: Non-pressured, somewhat soft, improved articulation  Mood: \"I don't like all this medication\"  Affect: Irritable and guarded  Thought processes: Perseverative with some tangentiality  Thought Content: Denies SI, denies HI, Less overall paranoia, +confabulation  Perceptions: No AVH, not actively RTIS during interview  Attention: slight improvement  Abstraction: impaired  Cognition: Average LUDWIG, Alert and oriented to person, place, but no longer to time, or situation, immediate and recent recall impaired  Insight: Poor insight   Judgment: Impaired judgment      LAB: Reviewed labs from last 24 hours      Assessment and Plan:     Diagnoses:   Primary Psychiatric (DSM V) Diagnosis: Unspecified psychosis  Secondary Psychiatric (DSM V) Diagnoses: Unspecified mood disorder; Panic disorder without agoraphobia  Chemical Dependency Diagnoses: None  Active Medical Diagnoses:        Patient Active Problem List     Diagnosis Date Noted    Uncontrolled type 2 diabetes mellitus with hyperglycemia ([facility] [address]      Depression 07/12/2020    Panic disorder without agoraphobia      Attention deficit hyperactivity disorder (ADHD), predominantly inattentive type 05/17/2017    Nausea and vomiting 03/11/2013    Gastric banding status 12/12/2012    Morbid obesity ([facility] [address]      Sleep apnea      Chronic back pain      Meniere disorder      Meniere disease 08/29/2011    GEOFF (obstructive sleep apnea) 08/29/2011    Hypertension      Hyperlipidemia      Obesity      Type 2 diabetes, uncontrolled, 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      Tx plan: Generally: prevent self injury/aggression, stabilize mood/anxiety/psychotic/behavioral disturbance, establish/maintain aftercare, increase coping mechanisms, improve medication compliance.  All conditions present on admission are being treated while pt is hospitalized.      Legal Status: Probated on 7/21     Primary Psychiatric Issues:  1. Unspecified psychotic and mood disorders  Delirium appears to be slightly improved over last 48 hours.  -Started Depakote 250 mg TID on 7/16 will reduce to BID today as patient feels sedated after taking this medication.  Level 63; ammonia normal x2  -D/C'd abilify. This agent seems to have had no impact on either patient's psychosis or her delirium.  -Started haldol 2 mg BID on 7/22  -Decreased sertraline to 100 mg daily on 7/16.  -Changed diazepam to 2.5 mg BID prn on 7/22. -D/C'd CIWA protocol and prn lorazepam on 7/22.  -Start phone restrictions. Patient's phone calls with daughter, You Gilbert, unfortunately appear to be worsening her symptoms and adherence to treatment. Informed both patient and daughter of this intervention.     Chemical Dependency Issues:  No issues     Function:  No acute functional impairement  -Falls precautions     Medical Problems:  Internal medicine has been consulted. Appreciate recs.     Code Status: Full     Disposition:    -Housing: With family  -Current outpatient follow-up: Needs connection     Estimated length of stay: 14 to 21 days     Criteria for Discharge:  Not psychotic, not homicidal, not suicidal, behavioral disturbance under control, sleeping well, mood improved/stable, eating well, aftercare arranged. Total face to face time with patient was 35 minutes and more than 50 % of that time was spent counseling the patient on their symptoms, treatment and expected goals.     Josh Mendez MD  Staff Psychiatrist

## 2020-07-24 NOTE — GROUP NOTE
Group Therapy Note    Date: 7/24/2020    Group Start Time: 4329  Group End Time: 6029  Group Topic: Relaxation    [address]        Group Therapy Note    Attendees: 6    Patient's Goal: to engage in putting puzzles together to promote relaxation, decreased anxiety, stress reduction, mood improvement, and stimulation of cognitive skills. Notes: Meena Holcomb socialized and worked with peers to solve a puzzle for approximately 30 minutes. Meena Holcomb appeared to become distracted and was observed looking around the unit. Meena Holcomb was not redirectable to group activity. Meena Holcomb left group and did not return. Status After Intervention:  Unchanged    Participation Level:  Active Listener and Interactive    Participation Quality: Appropriate      Speech:  normal      Thought Process/Content: Delusional      Affective Functioning: Constricted/Restricted      Mood: anxious      Level of consciousness:  Alert      Response to Learning: Capable of insight, Able to change behavior and Progressing to goal      Endings: None Reported    Modes of Intervention: Education, Support, Socialization, Exploration, Clarifying, Problem-solving and Activity      Discipline Responsible: Psychoeducational Specialist      Signature:  ALYSSA Hamilton

## 2020-07-24 NOTE — PLAN OF CARE
Pt. Revealed her reason for not taking her medication was that her daughter Nathan Hough had told her not to take her medications. Dr. Varsha Gordon asked if we could get clarification from Nathan Hough. Melany admitted that she did tell her mother not to take any medications she was concerned about or didn't understand. She also said she felt as if her mother seemed better to her in the past few days since they first had this conversation in her opinion. Dr. Varsha Gordon ordered that staff limit patients calls from Nathan Hough to promote treatment compliance.

## 2020-07-24 NOTE — PLAN OF CARE
Pt. Is paranoid and suspicious, refusing medications in the morning but later in the afternoon accepted her morning medications. Moves about independently, good appetite, attending afternoon group. Isolative to her bedroom mostly today. Refused a shower, continues to exhibit some disorientation. Continues to refuse insulin through lunch.coverage , 272.

## 2020-07-24 NOTE — PROGRESS NOTES
Occupational Therapy  Attempted to see patient this am but patient engaged in therapeutic group with peers. Will reattempt at a later time/date as status allows.   Billy Reynolds, OTR/L [id number]

## 2020-07-25 LAB
GLUCOSE BLD-MCNC: 177 MG/DL (ref 70–99)
GLUCOSE BLD-MCNC: 214 MG/DL (ref 70–99)
GLUCOSE BLD-MCNC: 285 MG/DL (ref 70–99)
GLUCOSE BLD-MCNC: 351 MG/DL (ref 70–99)
PERFORMED ON: ABNORMAL

## 2020-07-25 PROCEDURE — 6370000000 HC RX 637 (ALT 250 FOR IP): Performed by: PHYSICIAN ASSISTANT

## 2020-07-25 PROCEDURE — 6370000000 HC RX 637 (ALT 250 FOR IP): Performed by: PSYCHIATRY & NEUROLOGY

## 2020-07-25 PROCEDURE — 1240000000 HC EMOTIONAL WELLNESS R&B

## 2020-07-25 PROCEDURE — 94660 CPAP INITIATION&MGMT: CPT

## 2020-07-25 PROCEDURE — 6360000002 HC RX W HCPCS: Performed by: PSYCHIATRY & NEUROLOGY

## 2020-07-25 PROCEDURE — 99233 SBSQ HOSP IP/OBS HIGH 50: CPT | Performed by: PSYCHIATRY & NEUROLOGY

## 2020-07-25 RX ORDER — HALOPERIDOL 1 MG/1
2 TABLET ORAL 3 TIMES DAILY
Status: DISCONTINUED | OUTPATIENT
Start: 2020-07-25 | End: 2020-07-25

## 2020-07-25 RX ORDER — HALOPERIDOL 5 MG
5 TABLET ORAL EVERY 6 HOURS PRN
Status: DISCONTINUED | OUTPATIENT
Start: 2020-07-25 | End: 2020-07-29 | Stop reason: HOSPADM

## 2020-07-25 RX ORDER — HALOPERIDOL 5 MG/ML
5 INJECTION INTRAMUSCULAR EVERY 6 HOURS PRN
Status: DISCONTINUED | OUTPATIENT
Start: 2020-07-25 | End: 2020-07-29 | Stop reason: HOSPADM

## 2020-07-25 RX ORDER — HALOPERIDOL 5 MG
5 TABLET ORAL 2 TIMES DAILY
Status: DISCONTINUED | OUTPATIENT
Start: 2020-07-25 | End: 2020-07-29 | Stop reason: HOSPADM

## 2020-07-25 RX ADMIN — METFORMIN HYDROCHLORIDE 1000 MG: 500 TABLET ORAL at 17:44

## 2020-07-25 RX ADMIN — INSULIN LISPRO 3 UNITS: 100 INJECTION, SOLUTION INTRAVENOUS; SUBCUTANEOUS at 20:31

## 2020-07-25 RX ADMIN — PANTOPRAZOLE SODIUM 40 MG: 40 TABLET, DELAYED RELEASE ORAL at 09:26

## 2020-07-25 RX ADMIN — PREDNISOLONE ACETATE 1 DROP: 10 SUSPENSION/ DROPS OPHTHALMIC at 20:29

## 2020-07-25 RX ADMIN — LEVOTHYROXINE SODIUM 175 MCG: 150 TABLET ORAL at 09:27

## 2020-07-25 RX ADMIN — HALOPERIDOL 5 MG: 5 TABLET ORAL at 20:29

## 2020-07-25 RX ADMIN — LISINOPRIL 5 MG: 5 TABLET ORAL at 09:27

## 2020-07-25 RX ADMIN — SERTRALINE HYDROCHLORIDE 100 MG: 100 TABLET ORAL at 09:26

## 2020-07-25 RX ADMIN — ATORVASTATIN CALCIUM 10 MG: 10 TABLET, FILM COATED ORAL at 09:27

## 2020-07-25 RX ADMIN — INSULIN GLARGINE 12 UNITS: 100 INJECTION, SOLUTION SUBCUTANEOUS at 09:57

## 2020-07-25 RX ADMIN — DIVALPROEX SODIUM 250 MG: 250 TABLET, DELAYED RELEASE ORAL at 09:26

## 2020-07-25 RX ADMIN — METFORMIN HYDROCHLORIDE 1000 MG: 500 TABLET ORAL at 09:27

## 2020-07-25 RX ADMIN — SPIRONOLACTONE 12.5 MG: 25 TABLET ORAL at 09:26

## 2020-07-25 RX ADMIN — CETIRIZINE HYDROCHLORIDE 10 MG: 10 TABLET ORAL at 09:26

## 2020-07-25 RX ADMIN — HALOPERIDOL 2 MG: 1 TABLET ORAL at 09:26

## 2020-07-25 RX ADMIN — PREDNISOLONE ACETATE 1 DROP: 10 SUSPENSION/ DROPS OPHTHALMIC at 09:26

## 2020-07-25 RX ADMIN — INSULIN GLARGINE 12 UNITS: 100 INJECTION, SOLUTION SUBCUTANEOUS at 20:29

## 2020-07-25 RX ADMIN — HALOPERIDOL LACTATE 5 MG: 5 INJECTION, SOLUTION INTRAMUSCULAR at 13:18

## 2020-07-25 RX ADMIN — ASPIRIN 81 MG: 81 TABLET, COATED ORAL at 09:27

## 2020-07-25 RX ADMIN — DIVALPROEX SODIUM 250 MG: 250 TABLET, DELAYED RELEASE ORAL at 20:29

## 2020-07-25 RX ADMIN — TRAZODONE HYDROCHLORIDE 25 MG: 50 TABLET ORAL at 20:29

## 2020-07-25 ASSESSMENT — PAIN SCALES - GENERAL
PAINLEVEL_OUTOF10: 0
PAINLEVEL_OUTOF10: 0

## 2020-07-25 NOTE — BH NOTE
Pt A+O to self only tonight. She is mildly anxious, but pleasant, cooperative, and medication compliant. Pt spent the evening in the dayroom talking with Yvette Patel. She denies SI/HI/AVH and RTIS observed. Pt having visual hallucinations of dogs on the unit. Pt is concerned about not being able to use the phone to talk to [facility], per MD order. She thinks her daughter doesn't know where she is. Writer reassured her that her daughter knows where she is and that we have spoken to her several times while she has been here. She was given snacks and is currently resting in bed with no other needs at this time.

## 2020-07-25 NOTE — GROUP NOTE
Group Therapy Note    Date: 7/25/2020    Group Start Time: 10:00 AM  Group End Time: 11:00 AM  Group Topic: Relaxation    [facility]        Group Therapy Note    Attendees: 3       Patient's Goal:  Pt will participate in watercolor painting a picture of a scene that relaxes them or brings them ana luisa. Notes:  Pt was distracted in group. She stated it was hard for her to concentrate due to another pt screaming in their room. Pt asked SW if she could borrow my phone so she could call her daughter. Pt asked the same question a second time after she was told no. Pt participated in group and was able to verbalize what she painted. Status After Intervention:  Unchanged    Participation Level:  Active Listener, interactive    Participation Quality: Sharing      Speech:  normal      Thought Process/Content: Logical      Affective Functioning: Congruent      Mood: depressed      Level of consciousness:  Alert and Oriented x4      Response to Learning: Able to verbalize current knowledge/experience and Able to verbalize/acknowledge new learning      Endings: None Reported    Modes of Intervention: Activity      Discipline Responsible: /Counselor      Signature:  PRISCILLA Waters 190

## 2020-07-25 NOTE — PROGRESS NOTES
Department of Psychiatry  Attending Progress Note    Admission Date:    7/13/2020    Chief complaint / Reason for Admission:  Suicidal ideation    Patient's chart was reviewed, case was discussed with nursing/OT/RT staff, and collaborated with  about the treatment plan. SUBJECTIVE:   Over last 24 hours:  Behavioral outbursts: No   Non-aggressive behavioral disturbance: Yes  Medication compliant: Partially  Need for seclusion/restraints: No  Sleeping adequately:  Yes  Appetite adequate: Yes  Attending groups: No    Pt appears overall less delirious, but again was quite psychotic today. She did take her medications yesterday with much encouragement. This morning, however, she again perseverated on wanting to leave the hospital.  Still feels she is being \"held prisoner\" and \"manipulated. \"  Suspicious of medications believing they caused her symptoms in the first place. Later in the morning pt became increasingly intrusive with peers and non-responsive to redirection. She started off by following an older, demented patient around the unit, then progressed to holding his hand and finally grabbing papers from him. At this point security called and patient physically escorted to her room then given IM haldol. During process patient scratched this provider.     Progressing overall: Minimal progress  Suicidal ideation: Denies  Homicidal ideation: Denies  Medication side effects: No    ROS: Patient has new complaints: no    Current Medications Ordered:   haloperidol  2 mg Oral TID    divalproex  250 mg Oral 2 times per day    insulin glargine  12 Units Subcutaneous BID    prednisoLONE acetate  1 drop Left Eye BID    insulin lispro  0-12 Units Subcutaneous TID WC    insulin lispro  0-6 Units Subcutaneous Nightly    aspirin  81 mg Oral Daily    cetirizine  10 mg Oral Daily    lisinopril  5 mg Oral Daily    pantoprazole  40 mg Oral Daily    atorvastatin  10 mg Oral Daily    spironolactone vomiting 03/11/2013    Gastric banding status 12/12/2012    Morbid obesity (HCC)      Sleep apnea      Chronic back pain      Meniere disorder      Meniere disease 08/29/2011    GEOFF (obstructive sleep apnea) 08/29/2011    Hypertension      Hyperlipidemia      Obesity      Type 2 diabetes, uncontrolled, 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      Tx plan: Generally: prevent self injury/aggression, stabilize mood/anxiety/psychotic/behavioral disturbance, establish/maintain aftercare, increase coping mechanisms, improve medication compliance.  All conditions present on admission are being treated while pt is hospitalized.      Legal Status: Probated on 7/21     Primary Psychiatric Issues:  1. Unspecified psychotic and mood disorders  Delirium appears to be slightly improved over last 48 hours.  -Continue depakote 250 mg BID. -D/C'd abilify. This agent seems to have had no impact on either patient's psychosis or her delirium. -Increase haldol to 5 mg BID  -D/C sertraline.  -Changed diazepam to 2.5 mg BID prn on 7/22. -D/C'd CIWA protocol and prn lorazepam on 7/22.  -Continue phone restrictions. Patient's phone calls with daughter, Alexandrea Escobar, unfortunately appear to be worsening her symptoms and adherence to treatment. Informed both patient and daughter of this intervention.     Chemical Dependency Issues:  No issues     Function:  No acute functional impairement  -Falls precautions     Medical Problems:  Internal medicine has been consulted.   Appreciate recs.     Code Status: Full     Disposition:    -Housing: With family  -Current outpatient follow-up: Needs connection     Estimated length of stay: 14 to 21 days     Criteria for Discharge:  Not psychotic, not homicidal, not suicidal, behavioral disturbance under control, sleeping well, mood improved/stable, eating well, aftercare arranged. Total face to face time with patient was 35 minutes and more than 50 % of that time was spent counseling the patient on their symptoms, treatment and expected goals.     Alessandro Malone MD  Staff Psychiatrist

## 2020-07-25 NOTE — PLAN OF CARE
Patient med compliant this am. Denies SI/HI/AVH this am.Oriented x1. Was initially suspicious with staff until injection. Patient took nap and awoke before supper. Patient relaxed and talked with daughter Adry Dorita. Phone number left on chart for daughter Adry Dorita. Patient sitting in room will continue to monitor.

## 2020-07-26 LAB
GLUCOSE BLD-MCNC: 133 MG/DL (ref 70–99)
GLUCOSE BLD-MCNC: 179 MG/DL (ref 70–99)
GLUCOSE BLD-MCNC: 208 MG/DL (ref 70–99)
GLUCOSE BLD-MCNC: 217 MG/DL (ref 70–99)
GLUCOSE BLD-MCNC: 225 MG/DL (ref 70–99)
PERFORMED ON: ABNORMAL

## 2020-07-26 PROCEDURE — 1240000000 HC EMOTIONAL WELLNESS R&B

## 2020-07-26 PROCEDURE — 94660 CPAP INITIATION&MGMT: CPT

## 2020-07-26 PROCEDURE — 6370000000 HC RX 637 (ALT 250 FOR IP): Performed by: PSYCHIATRY & NEUROLOGY

## 2020-07-26 PROCEDURE — 6370000000 HC RX 637 (ALT 250 FOR IP): Performed by: PHYSICIAN ASSISTANT

## 2020-07-26 RX ADMIN — LEVOTHYROXINE SODIUM 175 MCG: 150 TABLET ORAL at 08:31

## 2020-07-26 RX ADMIN — PREDNISOLONE ACETATE 1 DROP: 10 SUSPENSION/ DROPS OPHTHALMIC at 08:32

## 2020-07-26 RX ADMIN — CETIRIZINE HYDROCHLORIDE 10 MG: 10 TABLET ORAL at 08:32

## 2020-07-26 RX ADMIN — METFORMIN HYDROCHLORIDE 1000 MG: 500 TABLET ORAL at 08:31

## 2020-07-26 RX ADMIN — TRAZODONE HYDROCHLORIDE 25 MG: 50 TABLET ORAL at 22:53

## 2020-07-26 RX ADMIN — DIVALPROEX SODIUM 250 MG: 250 TABLET, DELAYED RELEASE ORAL at 08:31

## 2020-07-26 RX ADMIN — DIAZEPAM 2.5 MG: 5 TABLET ORAL at 22:07

## 2020-07-26 RX ADMIN — LISINOPRIL 5 MG: 5 TABLET ORAL at 08:32

## 2020-07-26 RX ADMIN — ASPIRIN 81 MG: 81 TABLET, COATED ORAL at 08:31

## 2020-07-26 RX ADMIN — INSULIN GLARGINE 12 UNITS: 100 INJECTION, SOLUTION SUBCUTANEOUS at 20:23

## 2020-07-26 RX ADMIN — INSULIN GLARGINE 12 UNITS: 100 INJECTION, SOLUTION SUBCUTANEOUS at 08:46

## 2020-07-26 RX ADMIN — HALOPERIDOL 5 MG: 5 TABLET ORAL at 08:32

## 2020-07-26 RX ADMIN — ATORVASTATIN CALCIUM 10 MG: 10 TABLET, FILM COATED ORAL at 08:32

## 2020-07-26 RX ADMIN — Medication: at 20:47

## 2020-07-26 RX ADMIN — PREDNISOLONE ACETATE 1 DROP: 10 SUSPENSION/ DROPS OPHTHALMIC at 20:43

## 2020-07-26 RX ADMIN — SPIRONOLACTONE 12.5 MG: 25 TABLET ORAL at 08:31

## 2020-07-26 RX ADMIN — INSULIN LISPRO 2 UNITS: 100 INJECTION, SOLUTION INTRAVENOUS; SUBCUTANEOUS at 20:24

## 2020-07-26 RX ADMIN — DIVALPROEX SODIUM 250 MG: 250 TABLET, DELAYED RELEASE ORAL at 20:22

## 2020-07-26 RX ADMIN — HALOPERIDOL 5 MG: 5 TABLET ORAL at 20:22

## 2020-07-26 RX ADMIN — PANTOPRAZOLE SODIUM 40 MG: 40 TABLET, DELAYED RELEASE ORAL at 08:31

## 2020-07-26 NOTE — BH NOTE
Patient ate dinner and c/o blood sugar dropping blood sugar recheck 133 metformin held per patient request.

## 2020-07-26 NOTE — PLAN OF CARE
Patient rested most of morning. Med compliant. Oriented x3. No suspiciousness of staff this shift. Incontinent of bowel this afternoon in BR an all over floor. Assisted  with shower. Denies SI/HI/AVH. Resting in bed. Will continue to monitor.

## 2020-07-26 NOTE — GROUP NOTE
Group Therapy Note    Date: 7/26/2020    Group Start Time: 1310  Group End Time: 1430  Group Topic: Cognitive Skills    R Kanchan Ball, [facility]; Norman Kevin MSW, LSW      Group Therapy Note    Attendees: 4    Patients engaged in painting activity with therapist support and were encouraged to paint something that they find brings them ana luisa. Patients discussed importance of engaging in leisure activities during group activity and shared their paintings with one another. Notes:  Patient was hesitant to join group and sat on edge of group room. Pt was encouraged by peers and therapist to join group multiple times and pt reported not feeling well.     Status After Intervention:  Unchanged    Participation Level: None    Participation Quality: Resistant      Speech:  hesitant      Thought Process/Content: TAMIE      Affective Functioning: Flat and Constricted/Restricted      Mood: anxious and depressed      Level of consciousness:  Alert      Response to Learning: Resistant      Endings: None Reported    Modes of Intervention: Education, Support, Socialization, Exploration, Clarifying, Problem-solving and Activity      Discipline Responsible: /Counselor      Signature:  PEGGY Vincent-S, VAN

## 2020-07-26 NOTE — PLAN OF CARE
[place] Steve was visible on the unit in the early evening. She is very disheveled and unkempt. She is oriented only to person and sometimes place. She had been focused on using the phone to call daughter Adry Kevin but no staff were available to supervise at the time. She was med compliant without much hesitation. States, \"I trust you. \" Denies SI/HI/AVH. Went to bed early. CPAP on. Bed alarm on. Will monitor.

## 2020-07-27 LAB
GLUCOSE BLD-MCNC: 156 MG/DL (ref 70–99)
GLUCOSE BLD-MCNC: 186 MG/DL (ref 70–99)
GLUCOSE BLD-MCNC: 207 MG/DL (ref 70–99)
GLUCOSE BLD-MCNC: 208 MG/DL (ref 70–99)
PERFORMED ON: ABNORMAL
VALPROIC ACID LEVEL: 41.8 UG/ML (ref 50–100)

## 2020-07-27 PROCEDURE — 99233 SBSQ HOSP IP/OBS HIGH 50: CPT | Performed by: PSYCHIATRY & NEUROLOGY

## 2020-07-27 PROCEDURE — 1240000000 HC EMOTIONAL WELLNESS R&B

## 2020-07-27 PROCEDURE — 36415 COLL VENOUS BLD VENIPUNCTURE: CPT

## 2020-07-27 PROCEDURE — 94660 CPAP INITIATION&MGMT: CPT

## 2020-07-27 PROCEDURE — 6370000000 HC RX 637 (ALT 250 FOR IP): Performed by: PHYSICIAN ASSISTANT

## 2020-07-27 PROCEDURE — 80164 ASSAY DIPROPYLACETIC ACD TOT: CPT

## 2020-07-27 PROCEDURE — 6370000000 HC RX 637 (ALT 250 FOR IP): Performed by: PSYCHIATRY & NEUROLOGY

## 2020-07-27 RX ADMIN — METFORMIN HYDROCHLORIDE 1000 MG: 500 TABLET ORAL at 08:10

## 2020-07-27 RX ADMIN — DIVALPROEX SODIUM 250 MG: 250 TABLET, DELAYED RELEASE ORAL at 20:49

## 2020-07-27 RX ADMIN — ATORVASTATIN CALCIUM 10 MG: 10 TABLET, FILM COATED ORAL at 08:11

## 2020-07-27 RX ADMIN — INSULIN GLARGINE 12 UNITS: 100 INJECTION, SOLUTION SUBCUTANEOUS at 12:00

## 2020-07-27 RX ADMIN — LISINOPRIL 5 MG: 5 TABLET ORAL at 08:10

## 2020-07-27 RX ADMIN — METFORMIN HYDROCHLORIDE 1000 MG: 500 TABLET ORAL at 16:47

## 2020-07-27 RX ADMIN — HALOPERIDOL 5 MG: 5 TABLET ORAL at 08:11

## 2020-07-27 RX ADMIN — TRAZODONE HYDROCHLORIDE 25 MG: 50 TABLET ORAL at 20:48

## 2020-07-27 RX ADMIN — PREDNISOLONE ACETATE 1 DROP: 10 SUSPENSION/ DROPS OPHTHALMIC at 08:14

## 2020-07-27 RX ADMIN — ASPIRIN 81 MG: 81 TABLET, COATED ORAL at 08:10

## 2020-07-27 RX ADMIN — LEVOTHYROXINE SODIUM 175 MCG: 150 TABLET ORAL at 08:09

## 2020-07-27 RX ADMIN — SPIRONOLACTONE 12.5 MG: 25 TABLET ORAL at 08:10

## 2020-07-27 RX ADMIN — CETIRIZINE HYDROCHLORIDE 10 MG: 10 TABLET ORAL at 08:10

## 2020-07-27 RX ADMIN — HALOPERIDOL 5 MG: 5 TABLET ORAL at 20:48

## 2020-07-27 RX ADMIN — Medication: at 08:14

## 2020-07-27 RX ADMIN — PANTOPRAZOLE SODIUM 40 MG: 40 TABLET, DELAYED RELEASE ORAL at 08:11

## 2020-07-27 RX ADMIN — INSULIN GLARGINE 12 UNITS: 100 INJECTION, SOLUTION SUBCUTANEOUS at 08:16

## 2020-07-27 RX ADMIN — PREDNISOLONE ACETATE 1 DROP: 10 SUSPENSION/ DROPS OPHTHALMIC at 20:49

## 2020-07-27 RX ADMIN — DIAZEPAM 2.5 MG: 5 TABLET ORAL at 20:58

## 2020-07-27 RX ADMIN — DIVALPROEX SODIUM 250 MG: 250 TABLET, DELAYED RELEASE ORAL at 08:09

## 2020-07-27 RX ADMIN — INSULIN LISPRO 2 UNITS: 100 INJECTION, SOLUTION INTRAVENOUS; SUBCUTANEOUS at 20:52

## 2020-07-27 NOTE — PROGRESS NOTES
07/26/20 2118   NIV Type   $NIV $Daily Charge   Skin Assessment Clean, dry, & intact   Skin Protection for O2 Device N/A   Equipment Type respironics   Mode CPAP   Mask Type Nasal mask   Mask Size Medium   Settings/Measurements   CPAP/EPAP 12 cmH2O   Resp 16   O2 Flow Rate (L/min) 99 L/min   FiO2  21 %   Comfort Level Good   Using Accessory Muscles No   SpO2 99   Patient Observation   Observations spo2 99% on cpap 12

## 2020-07-27 NOTE — BH NOTE
[facility] was invited and encouraged to attend [address]. Pt declined invites, stating \"No thank you. \" Pt did not attend.     Jennifer Wallace, SANTOS

## 2020-07-27 NOTE — BH NOTE
SW attempted to reach the pt's daughter, Brianne Jeffery, at [phone] and at [phone]. SW left a VM on the [phone] # requesting a call back.           Cass, LISW-S

## 2020-07-27 NOTE — PLAN OF CARE
[facility] Interdisciplinary Treatment Plan Note     Review Date & Time: 7/27/20 1037     Patient was not in treatment team.    Admission Type:   Admission Type: Involuntary    Reason for admission:  Reason for Admission: Depression    Estimated Length of Stay Update:  2-3 days   Estimated Discharge Date Update: 7/29/20-7/30/20    PATIENT STRENGTHS:  Patient Strengths:Strengths: Communication, Positive Support, Social Skills, Motivated  Patient Strengths and Limitations:Limitations: Difficult relationships / poor social skills, Tendency to isolate self  Addictive Behavior:Addictive Behavior  In the past 3 months, have you felt or has someone told you that you have a problem with:  : None  Do you have a history of Chemical Use?: No  Do you have a history of Alcohol Use?: Yes(Pt drank in the late 1980's.)  Do you have a history of Street Drug Abuse?: No  Histroy of Prescripton Drug Abuse?: No  Medical Problems:   Past Medical History:   Diagnosis Date    ADHD (attention deficit hyperactivity disorder)     Anesthesia     slow to wake when she was on diazepam    Chronic back pain     Deafness in right ear 1997    Depression     GERD (gastroesophageal reflux disease)     Hyperlipidemia     Hypertension     resolved    Hypothyroidism     Meniere disorder     Morbid obesity ([address]     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    Panic disorder without agoraphobia     Rash     follow be dermatologist, skin intact    Seizure ([address]     Toxemia    Sleep apnea     cpap    Type 2 diabetes, uncontrolled, 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  Changes to plan of Care  none    Status EXAM:   Status and Exam  Normal: No  Facial Expression: Flat  Affect: Constricted  Level of Consciousness: Alert  Mood:Normal: No  Mood: Depressed, Anxious  Motor Activity:Normal: No  Motor Activity: Decreased  Interview Behavior: Cooperative  Preception: [place] to Person, Leighann Redmond to Time, [place] to Place  Attention:Normal: Yes  Attention: Distractible  Thought Processes: Circumstantial  Thought Content:Normal: Yes  Thought Content: Paranoia, Obsessions  Hallucinations: None  Delusions: No  Delusions: Obsessions  Memory:Normal: Yes  Memory: Poor Recent, Poor Remote  Insight and Judgment: No  Insight and Judgment: Poor Judgment, Poor Insight  Present Suicidal Ideation: No  Present Homicidal Ideation: No    Daily Assessment Last Entry:   Daily Sleep (WDL): Within Defined Limits         Patient Currently in Pain: Denies  Daily Nutrition (WDL): Within Defined Limits    Patient Monitoring:  Frequency of Checks: 4 times per hour, close    Psychiatric Symptoms:   Depression Symptoms  Depression Symptoms: Impaired concentration, Isolative  Anxiety Symptoms  Anxiety Symptoms: Generalized  Imani Symptoms  Imani Symptoms: Poor judgment     Psychosis Symptoms  Delusion Type: Paranoid    Suicide Risk CSSR-S:  1) Within the past month, have you wished you were dead or wished you could go to sleep and not wake up? : Yes  2) Have you actually had any thoughts of killing yourself? : Yes  3) Have you been thinking about how you might kill yourself? : Yes  5) Have you started to work out or worked out the details of how to kill yourself?  Do you intend to carry out this plan? : Yes  6) Have you ever done anything, started to do anything, or prepared to do anything to end your life?: No  Change in Result none Change in Plan of care none    EDUCATION:   Learner Progress Toward Treatment Goals: Reviewed results and recommendations of this team    Method: Individual    Outcome: Verbalized understanding    PATIENT GOALS: none expressed    PLAN/TREATMENT RECOMMENDATIONS UPDATE: continue treatment    GOALS UPDATE:  Time frame for Short-Term Goals: 1 day      Liz Guerrero RN

## 2020-07-27 NOTE — PLAN OF CARE
Pt is mostly quiet and withdrawn to her room. Attended a couple of the groups today. Compliant with medications but refused insulin coverage. Good appetite, not responding to internal stimuli. Denies SI HI AVH.

## 2020-07-27 NOTE — PROGRESS NOTES
Pt. Took cpap off and is now wondering the unit. Took cpap device and placed behind the nurses station.

## 2020-07-27 NOTE — BH NOTE
[facility] Pt declined invite, stating \"no, I have to go home and go to my appointment tomorrow. \" Meena Holcomb did not attend group with maximum encouragement.     Denis Soliman, CTRS

## 2020-07-27 NOTE — BH NOTE
Patient given HS medications without issues. Patient instilled eye gtt into eye. Requested the ointment for her toe, it was applied. Respiratory here now setting up patent's CPAP machine. She denies pain,  Alert and oriented x3, person, place and date, not situation. Ambulating around her room and the unit. Patient requested to use the phone. PCA monitored patient's phone call in order to limit contact with her daughter Taina Reed. Patient was able to use the phone appropriately then return it to staff. Patient has bruising to abdomen, acne areas to upper back. She is wearing pull ups and is incontinent at times. Patient states to writer that she works down at [employer] as an . Patient has FOI and trails off topic quickly. Will continue to monitor.

## 2020-07-27 NOTE — PROGRESS NOTES
**OR** haloperidol, diazePAM, mupirocin, sodium chloride flush, phenol, acetaminophen, traZODone, benztropine mesylate, magnesium hydroxide, aluminum & magnesium hydroxide-simethicone, glucose, dextrose, glucagon (rDNA), dextrose, cyclobenzaprine     Objective:     PE:    /69   Pulse 98   Temp 97.6 °F (36.4 °C) (Temporal)   Resp 16   Ht 5' 7.5\" (1.715 m)   Wt 262 lb 11.2 oz (119.2 kg)   LMP 08/01/2012   SpO2 99%   BMI 40.54 kg/m²         Mental Status Examination:    Appearance: WF, appears stated age, wearing hospital gown disheveled grooming and hygiene  Behavior/Attitude toward examiner:  Much more cooperative than last assessment   Speech: Non-pressured, nml volume, nml amount  Mood: \"Better, more clear\"  Affect: Constricted  Thought processes: More linear today  Thought Content: Denies SI, denies HI, no paranoia voiced today  Perceptions: Presently denies AVH, not actively RTIS during interview  Attention: slight improvement  Abstraction: impaired  Cognition: Average LUDWIG, Alert and oriented to person, place, time, and loosely situation, recent recall impaired  Insight: Poor insight   Judgment: Impaired judgment      LAB: Reviewed labs from last 24 hours      Assessment and Plan:     Diagnoses:   Primary Psychiatric (DSM V) Diagnosis: Unspecified psychosis  Secondary Psychiatric (DSM V) Diagnoses: Unspecified mood disorder; Panic disorder without agoraphobia  Chemical Dependency Diagnoses: None  Active Medical Diagnoses:        Patient Active Problem List     Diagnosis Date Noted    Uncontrolled type 2 diabetes mellitus with hyperglycemia ([facility] [address]      Depression 07/12/2020    Panic disorder without agoraphobia      Attention deficit hyperactivity disorder (ADHD), predominantly inattentive type 05/17/2017    Nausea and vomiting 03/11/2013    Gastric banding status 12/12/2012    Morbid obesity ([facility] [address]      Sleep apnea      Chronic back pain      Meniere disorder      Meniere disease 08/29/2011  GEOFF (obstructive sleep apnea) 08/29/2011    Hypertension      Hyperlipidemia      Obesity      Type 2 diabetes, uncontrolled, 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      Tx plan: Generally: prevent self injury/aggression, stabilize mood/anxiety/psychotic/behavioral disturbance, establish/maintain aftercare, increase coping mechanisms, improve medication compliance.  All conditions present on admission are being treated while pt is hospitalized.      Legal Status: Probated on 7/21     Primary Psychiatric Issues:  1. Unspecified psychotic and mood disorders  Delirium appears to be slightly improved over last 48 hours.  -Continue depakote 250 mg BID. Level 49 today. -D/C'd abilify. This agent seems to have had no impact on either patient's psychosis or her delirium. -Increased haldol to 5 mg BID on 7/25.  -D/C'd sertraline on 7/25.  -Changed diazepam to 2.5 mg BID prn on 7/22. -D/C'd CIWA protocol and prn lorazepam on 7/22.  -Continue phone restrictions. Patient's phone calls with daughter, Samia Cristobal, unfortunately appear to be worsening her symptoms and adherence to treatment. Informed both patient and daughter of this intervention.     Chemical Dependency Issues:  No issues     Function:  No acute functional impairement  -Falls precautions     Medical Problems:  Internal medicine has been consulted. Appreciate recs.     Code Status: Full     Disposition:    -Housing: With family  -Current outpatient follow-up: Needs connection     Estimated length of stay: 14 to 21 days     Criteria for Discharge:  Not psychotic, not homicidal, not suicidal, behavioral disturbance under control, sleeping well, mood improved/stable, eating well, aftercare arranged.      Total face to face time with patient was 35 minutes and more than 50 % of that time was spent counseling the patient on their symptoms, treatment and expected goals.     Amber Moon MD  Staff Psychiatrist

## 2020-07-28 ENCOUNTER — TELEPHONE (OUTPATIENT)
Dept: PRIMARY CARE CLINIC | Age: 65
End: 2020-07-28

## 2020-07-28 LAB
GLUCOSE BLD-MCNC: 180 MG/DL (ref 70–99)
GLUCOSE BLD-MCNC: 207 MG/DL (ref 70–99)
GLUCOSE BLD-MCNC: 220 MG/DL (ref 70–99)
GLUCOSE BLD-MCNC: 296 MG/DL (ref 70–99)
PERFORMED ON: ABNORMAL

## 2020-07-28 PROCEDURE — 1240000000 HC EMOTIONAL WELLNESS R&B

## 2020-07-28 PROCEDURE — 6370000000 HC RX 637 (ALT 250 FOR IP): Performed by: PHYSICIAN ASSISTANT

## 2020-07-28 PROCEDURE — 99232 SBSQ HOSP IP/OBS MODERATE 35: CPT | Performed by: PHYSICIAN ASSISTANT

## 2020-07-28 PROCEDURE — 99233 SBSQ HOSP IP/OBS HIGH 50: CPT | Performed by: PSYCHIATRY & NEUROLOGY

## 2020-07-28 PROCEDURE — 94660 CPAP INITIATION&MGMT: CPT

## 2020-07-28 PROCEDURE — 6370000000 HC RX 637 (ALT 250 FOR IP): Performed by: PSYCHIATRY & NEUROLOGY

## 2020-07-28 RX ORDER — INSULIN GLARGINE 100 [IU]/ML
14 INJECTION, SOLUTION SUBCUTANEOUS 2 TIMES DAILY
Status: DISCONTINUED | OUTPATIENT
Start: 2020-07-28 | End: 2020-07-29 | Stop reason: HOSPADM

## 2020-07-28 RX ADMIN — DIVALPROEX SODIUM 250 MG: 250 TABLET, DELAYED RELEASE ORAL at 08:35

## 2020-07-28 RX ADMIN — INSULIN LISPRO 3 UNITS: 100 INJECTION, SOLUTION INTRAVENOUS; SUBCUTANEOUS at 22:57

## 2020-07-28 RX ADMIN — CETIRIZINE HYDROCHLORIDE 10 MG: 10 TABLET ORAL at 08:35

## 2020-07-28 RX ADMIN — HALOPERIDOL 5 MG: 5 TABLET ORAL at 22:56

## 2020-07-28 RX ADMIN — INSULIN GLARGINE 12 UNITS: 100 INJECTION, SOLUTION SUBCUTANEOUS at 08:44

## 2020-07-28 RX ADMIN — METFORMIN HYDROCHLORIDE 1000 MG: 500 TABLET ORAL at 16:15

## 2020-07-28 RX ADMIN — LISINOPRIL 5 MG: 5 TABLET ORAL at 08:35

## 2020-07-28 RX ADMIN — ASPIRIN 81 MG: 81 TABLET, COATED ORAL at 08:35

## 2020-07-28 RX ADMIN — DIVALPROEX SODIUM 250 MG: 250 TABLET, DELAYED RELEASE ORAL at 22:57

## 2020-07-28 RX ADMIN — SPIRONOLACTONE 12.5 MG: 25 TABLET ORAL at 08:35

## 2020-07-28 RX ADMIN — METFORMIN HYDROCHLORIDE 1000 MG: 500 TABLET ORAL at 08:35

## 2020-07-28 RX ADMIN — HALOPERIDOL 5 MG: 5 TABLET ORAL at 08:35

## 2020-07-28 RX ADMIN — INSULIN GLARGINE 14 UNITS: 100 INJECTION, SOLUTION SUBCUTANEOUS at 22:57

## 2020-07-28 RX ADMIN — PREDNISOLONE ACETATE 1 DROP: 10 SUSPENSION/ DROPS OPHTHALMIC at 08:36

## 2020-07-28 RX ADMIN — LEVOTHYROXINE SODIUM 175 MCG: 150 TABLET ORAL at 08:35

## 2020-07-28 RX ADMIN — PANTOPRAZOLE SODIUM 40 MG: 40 TABLET, DELAYED RELEASE ORAL at 08:35

## 2020-07-28 RX ADMIN — ATORVASTATIN CALCIUM 10 MG: 10 TABLET, FILM COATED ORAL at 08:35

## 2020-07-28 ASSESSMENT — PAIN SCALES - GENERAL: PAINLEVEL_OUTOF10: 0

## 2020-07-28 NOTE — PROGRESS NOTES
Pt A+Ox4, but at times has some confused speech. Pt is calm, pleasant, and medication compliant. Pt denies SI/HI/AVH and no RTIS observed. Pt has been isolative to room. Pt is resting in bed and denies any needs at this time.

## 2020-07-28 NOTE — BH NOTE
CORDELL attempted to call Evin Malone at [facility] to schedule outpatient services for pt, which is whom was recommended by [facility]. CORDELL was unable to leave a  because her voicemail box had not been set up yet. CORDELL reached out to Ms. Jailene Steve via e-mail requesting a return call.            EDDI Odell

## 2020-07-28 NOTE — PROGRESS NOTES
Department of Psychiatry  Attending Progress Note    Admission Date:    7/13/2020    Chief complaint / Reason for Admission:  Suicidal ideation    Patient's chart was reviewed, case was discussed with nursing/OT/RT staff, and collaborated with  about the treatment plan. SUBJECTIVE:   Over last 24 hours:  Behavioral outbursts: No   Non-aggressive behavioral disturbance: No  Medication compliant: Yes  Need for seclusion/restraints: No  Sleeping adequately:  Yes  Appetite adequate: Yes  Attending groups: No    Pt continues to demonstrate improvement. Remains medication compliant. Needs encouragement to take meds, but less so than previously. Not observed hallucinating and no obvious delusions voiced. Pt continues to make occasional confused or confabulatory statements, but these have lessened as well. She is overall more clear. Thorough review of her chart indicates a long pattern of high utilization of services. Going back years it appears that patient has contact with her outpatient providers nearly every other day. Chart contains innumerable telephone, epic message, and in person contacts with providers. Moreover, she has been dismissed from several providers related to no-shows and nonadherence with care. I was able to reach pt's current PCP. He confirms that pt has demonstrated chronic non-adherence. She does not seem to accept his recommendations about her DM management and is very resistant to change. Additionally, he notes that it is often very difficult to get her to stay on topic during appointments, and she makes it difficult to end appointments due to meandering from one concern to another, typically unrelated to the reason for her visit. The above support my sense that patient has been chronically ill for some time, and indeed may have underlying, hitherto undiagnosed bipolar disorder.   I detailed admission to date, assessment, and recommendations to current PCP for continuity purposes.     Progressing overall: Some progress  Suicidal ideation: Denies  Homicidal ideation: Denies  Medication side effects: No    ROS: Patient has new complaints: no    Current Medications Ordered:   insulin glargine  14 Units Subcutaneous BID    haloperidol  5 mg Oral BID    divalproex  250 mg Oral 2 times per day    prednisoLONE acetate  1 drop Left Eye BID    insulin lispro  0-12 Units Subcutaneous TID WC    insulin lispro  0-6 Units Subcutaneous Nightly    aspirin  81 mg Oral Daily    cetirizine  10 mg Oral Daily    lisinopril  5 mg Oral Daily    pantoprazole  40 mg Oral Daily    atorvastatin  10 mg Oral Daily    spironolactone  12.5 mg Oral Daily    levothyroxine  175 mcg Oral Daily    metFORMIN  1,000 mg Oral BID WC      PRN Meds: haloperidol lactate **OR** haloperidol, diazePAM, mupirocin, sodium chloride flush, phenol, acetaminophen, traZODone, benztropine mesylate, magnesium hydroxide, aluminum & magnesium hydroxide-simethicone, glucose, dextrose, glucagon (rDNA), dextrose, cyclobenzaprine     Objective:     PE:    /71   Pulse 110   Temp 97.7 °F (36.5 °C) (Oral)   Resp 16   Ht 5' 7.5\" (1.715 m)   Wt 262 lb 11.2 oz (119.2 kg)   LMP 08/01/2012   SpO2 99%   BMI 40.54 kg/m²         Mental Status Examination:    Appearance: WF, appears stated age, wearing hospital gown disheveled grooming and hygiene  Behavior/Attitude toward examiner:  Much more cooperative than last assessment   Speech: Non-pressured, nml volume, nml amount  Mood: \"Okay\"  Affect: Constricted  Thought processes: More linear today  Thought Content: Denies SI, denies HI, no paranoia voiced today  Perceptions: Presently denies AVH, not actively RTIS during interview  Attention: slight improvement  Abstraction: impaired  Cognition: Average LUDWIG, Alert and oriented to person, place, time, and loosely situation, recent recall impaired  Insight: Poor insight   Judgment: Impaired judgment      LAB: Reviewed labs from last 24 hours      Assessment and Plan:     Diagnoses:   Primary Psychiatric (DSM V) Diagnosis: Unspecified psychosis  Secondary Psychiatric (DSM V) Diagnoses: Unspecified mood disorder; Panic disorder without agoraphobia  Chemical Dependency Diagnoses: None  Active Medical Diagnoses:        Patient Active Problem List     Diagnosis Date Noted    Uncontrolled type 2 diabetes mellitus with hyperglycemia ([facility] [address]      Depression 07/12/2020    Panic disorder without agoraphobia      Attention deficit hyperactivity disorder (ADHD), predominantly inattentive type 05/17/2017    Nausea and vomiting 03/11/2013    Gastric banding status 12/12/2012    Morbid obesity ([facility] [address]      Sleep apnea      Chronic back pain      Meniere disorder      Meniere disease 08/29/2011    GEOFF (obstructive sleep apnea) 08/29/2011    Hypertension      Hyperlipidemia      Obesity      Type 2 diabetes, uncontrolled, 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      Tx plan: Generally: prevent self injury/aggression, stabilize mood/anxiety/psychotic/behavioral disturbance, establish/maintain aftercare, increase coping mechanisms, improve medication compliance.  All conditions present on admission are being treated while pt is hospitalized.      Legal Status: Probated on 7/21     Primary Psychiatric Issues:  1. Unspecified psychotic and mood disorders  Delirium appears to be slightly improved over last few days  -Continue depakote 250 mg BID. Level 49 .  -D/C'd abilify. This agent seems to have had no impact on either patient's psychosis or her delirium. -Increased haldol to 5 mg BID on 7/25.  -D/C'd sertraline on 7/25.  -Changed diazepam to 2.5 mg BID prn on 7/22. -D/C'd CIWA protocol and prn lorazepam on 7/22.  -Continue phone restrictions.   Patient's phone calls with daughter, Melany, unfortunately appear to be worsening her symptoms and adherence to treatment. Informed both patient and daughter of this intervention.     Chemical Dependency Issues:  No issues     Function:  No acute functional impairement  -Falls precautions     Medical Problems:  Internal medicine has been consulted. Appreciate recs.     Code Status: Full     Disposition:    -Housing: With family  -Current outpatient follow-up: Needs connection     Estimated length of stay: 14 to 21 days     Criteria for Discharge:  Not psychotic, not homicidal, not suicidal, behavioral disturbance under control, sleeping well, mood improved/stable, eating well, aftercare arranged. Total face to face time with patient was 35 minutes and more than 50 % of that time was spent counseling the patient on their symptoms, treatment and expected goals.     Brent Hutchison MD  Staff Psychiatrist

## 2020-07-28 NOTE — BH NOTE
Lian Houston was invited and encouraged to attend [facility]. Pt declined invite, stating \"I have a headache. I've just been sleeping all afternoon. \" MT-BC informed RN. Pt did not attend group.     Miles Marcano, MT-BC

## 2020-07-28 NOTE — PROGRESS NOTES
Occupational Therapy    Attempted OT tx this afternoon. Pt was found sleeping, but woke easily. She declined OT tx at this time due to not sleeping well last night. Will re-attempt later as time allows.     Randolph Dewitt [address], OTR/L  #[id number]

## 2020-07-28 NOTE — PROGRESS NOTES
Progress Note    Admit Date:  7/13/2020    Subjective:  Ms. Sydnee Lock seen resting in bed. Denies any complaints. Objective:   /71   Pulse 110   Temp 97.7 °F (36.5 °C) (Oral)   Resp 16   Ht 5' 7.5\" (1.715 m)   Wt 262 lb 11.2 oz (119.2 kg)   LMP 08/01/2012   SpO2 99%   BMI 40.54 kg/m²           Intake/Output Summary (Last 24 hours) at 7/28/2020 1143  Last data filed at 7/28/2020 0900  Gross per 24 hour   Intake 720 ml   Output 1 ml   Net 719 ml       Physical Exam:  Gen: No distress. Alert. Pleasant  female, morbidly obese  Eyes: No conjunctival injection. ENT: No discharge. Pharynx clear w/o erythema or exudates  Neck:  Trachea midline. + small ingrown hair (improved)  Resp: No accessory muscle use. No crackles. No wheezes. No rhonchi. On RA  CV: tachycardia. Regular rhythm. No murmur. No rub. No edema. GI: Soft, obese, Non-tender. Non-distended. Normal bowel sounds. Skin: Warm and dry. No rash on exposed extremities. No ecchymosis noted to occipital region   M/S: No cyanosis. No clubbing. Good UE and LE strength bilaterally - 5/5; mildly TTP along occiput, good ROM at neck w/ rotation  Neuro: Awake. Grossly nonfocal, CN II-XII intact, ambulates w/o assistance  Psych: Oriented x 3. Defer to psychiatry.     Scheduled Meds:   haloperidol  5 mg Oral BID    divalproex  250 mg Oral 2 times per day    insulin glargine  12 Units Subcutaneous BID    prednisoLONE acetate  1 drop Left Eye BID    insulin lispro  0-12 Units Subcutaneous TID WC    insulin lispro  0-6 Units Subcutaneous Nightly    aspirin  81 mg Oral Daily    cetirizine  10 mg Oral Daily    lisinopril  5 mg Oral Daily    pantoprazole  40 mg Oral Daily    atorvastatin  10 mg Oral Daily    spironolactone  12.5 mg Oral Daily    levothyroxine  175 mcg Oral Daily    metFORMIN  1,000 mg Oral BID WC       Continuous Infusions:   dextrose         PRN Meds:  haloperidol lactate **OR** haloperidol, diazePAM, mupirocin, sodium chloride flush, phenol, acetaminophen, traZODone, benztropine mesylate, magnesium hydroxide, aluminum & magnesium hydroxide-simethicone, glucose, dextrose, glucagon (rDNA), dextrose, cyclobenzaprine    CULTURES    SARS-CoV-2: not detected  Urine: pending     RADIOLOGY  CT HEAD WO CONTRAST   Final Result   No acute intracranial abnormality. Assessment/Plan  Depression  - cont mgmt per [facility]    Fall  - CT head non-acute  - reports 2/2 medications that \"made me dizzy\"  - c/o mild pain in occipital region  - PRN ice    Sore Throat - improved  - add PRN Phenol spray    Sinus Tachycardia - Resolved  - 2/2 benzo w/d  - monitor for now -> BP stable     Type II DM  - uncontrolled -> improving, Hgb A1c: 10.8  - increase Lantus to 12 units BID-->now increased to 14 units BID, cont Metformin 1000 mg, Humalog SSI  - POC Glucose, carb control diet  - dietitian consulted    Cyst, from ingrown hair on neck  - Added Keflex (discontinued)  - use Mupirocin ointment. - area is improved. Dysuria, urinary incontinence, urinary frequency  - checked UA: NGTD  - added Keflex. Antibiotic discontinued     HTN  - well controlled  - cont Lisinopril, Aldactone  - monitor     Hypothyroidism  - home dose of synthroid 175 mcg      Hx of Meniere's Disease  - low Na diet     GERD  - cont Protonix     Allergies  - cont Zyrtec     HLD  - cont Lipitor     GEOFF  - cont home BiPAP     Morbid Obesity  - Body mass index is 40.54 kg/m². - Complicating assessment and treatment. Placing patient at risk for multiple co-morbidities as well as early death and contributing to the patient's presentation.   - Counseled on weight loss.      Renu Ku PA-C  7/28/2020 11:44 AM

## 2020-07-28 NOTE — GROUP NOTE
Group Therapy Note    Date: 7/28/2020    Group Start Time: 4000  Group End Time: 1843  Group Topic: Music Therapy    [address] [facility] ; [facility]      Group Therapy Note    Attendees: 4        Patient's Goal:  Increased appropriate group engagement, Increased socialization and reflective processing, Creative expression through engagement in music interventions    Notes:  Pt did not attend morning group therapy session, lying in bed exhibiting sleep behaviors with no arousal in response to invitation to attend group stimuli Pt entered group at conclusion of interventions, invited to join group table, choosing to sit at table adjacent to group stimuli. Pt inattentive to group processing. Will follow up for continued encouragement to engage in group therapy stimuli.     Signature:  Rad Hunter, MM, MT-BC; [facility], CTRS

## 2020-07-28 NOTE — TELEPHONE ENCOUNTER
I spoke with Dr. Melecio Mcrae, the psychiatrist who has been caring for Mrs. Dmitry Patel in [facility] for the last 2 weeks. .  The patient was admitted to the psych unit there for suicidal ideation but within 3 days started having hallucinations and became psychotic and occasionally delirious. It is the belief of Dr. Karely Michel that Mrs. Dmitry Patel has bipolar disorder type II, and he is treating her with Depakote 250 twice daily and Haldol 5 mg twice daily. Her hypomanic episodes are manifesting as irritability. She is off all other psychiatric meds including Ambien.   He expressly asked me to not prescribe any more psychoactive medications for this patient and instead to return her back to psychiatry should she have any mood disorder complaints

## 2020-07-28 NOTE — BH NOTE
Pt given Valium 2.5 PO per PRN order with HS medications per request.  Trazodone 25mg PO per PRN order given per request at 2058.

## 2020-07-29 VITALS
OXYGEN SATURATION: 97 % | DIASTOLIC BLOOD PRESSURE: 74 MMHG | HEART RATE: 102 BPM | SYSTOLIC BLOOD PRESSURE: 108 MMHG | TEMPERATURE: 96.2 F | HEIGHT: 68 IN | BODY MASS INDEX: 39.81 KG/M2 | WEIGHT: 262.7 LBS | RESPIRATION RATE: 17 BRPM

## 2020-07-29 PROBLEM — F29 PSYCHOSIS (HCC): Status: ACTIVE | Noted: 2020-07-12

## 2020-07-29 LAB
GLUCOSE BLD-MCNC: 144 MG/DL (ref 70–99)
GLUCOSE BLD-MCNC: 233 MG/DL (ref 70–99)
PERFORMED ON: ABNORMAL
PERFORMED ON: ABNORMAL

## 2020-07-29 PROCEDURE — 6370000000 HC RX 637 (ALT 250 FOR IP): Performed by: PHYSICIAN ASSISTANT

## 2020-07-29 PROCEDURE — 99239 HOSP IP/OBS DSCHRG MGMT >30: CPT | Performed by: PSYCHIATRY & NEUROLOGY

## 2020-07-29 PROCEDURE — 99232 SBSQ HOSP IP/OBS MODERATE 35: CPT | Performed by: PHYSICIAN ASSISTANT

## 2020-07-29 PROCEDURE — 6370000000 HC RX 637 (ALT 250 FOR IP): Performed by: PSYCHIATRY & NEUROLOGY

## 2020-07-29 PROCEDURE — 94660 CPAP INITIATION&MGMT: CPT

## 2020-07-29 PROCEDURE — 5130000000 HC BRIDGE APPOINTMENT

## 2020-07-29 RX ORDER — DIVALPROEX SODIUM 250 MG/1
250 TABLET, DELAYED RELEASE ORAL EVERY 12 HOURS SCHEDULED
Qty: 60 TABLET | Refills: 0 | Status: SHIPPED | OUTPATIENT
Start: 2020-07-29 | End: 2020-08-14

## 2020-07-29 RX ORDER — HALOPERIDOL 5 MG
5 TABLET ORAL 2 TIMES DAILY
Qty: 60 TABLET | Refills: 0 | Status: SHIPPED | OUTPATIENT
Start: 2020-07-29 | End: 2021-09-23 | Stop reason: ALTCHOICE

## 2020-07-29 RX ADMIN — INSULIN GLARGINE 14 UNITS: 100 INJECTION, SOLUTION SUBCUTANEOUS at 08:51

## 2020-07-29 RX ADMIN — SPIRONOLACTONE 12.5 MG: 25 TABLET ORAL at 08:48

## 2020-07-29 RX ADMIN — HALOPERIDOL 5 MG: 5 TABLET ORAL at 08:49

## 2020-07-29 RX ADMIN — CETIRIZINE HYDROCHLORIDE 10 MG: 10 TABLET ORAL at 08:49

## 2020-07-29 RX ADMIN — LEVOTHYROXINE SODIUM 175 MCG: 150 TABLET ORAL at 08:49

## 2020-07-29 RX ADMIN — ASPIRIN 81 MG: 81 TABLET, COATED ORAL at 08:49

## 2020-07-29 RX ADMIN — DIVALPROEX SODIUM 250 MG: 250 TABLET, DELAYED RELEASE ORAL at 08:48

## 2020-07-29 RX ADMIN — PANTOPRAZOLE SODIUM 40 MG: 40 TABLET, DELAYED RELEASE ORAL at 08:49

## 2020-07-29 RX ADMIN — ATORVASTATIN CALCIUM 10 MG: 10 TABLET, FILM COATED ORAL at 08:49

## 2020-07-29 RX ADMIN — PREDNISOLONE ACETATE 1 DROP: 10 SUSPENSION/ DROPS OPHTHALMIC at 08:49

## 2020-07-29 RX ADMIN — METFORMIN HYDROCHLORIDE 1000 MG: 500 TABLET ORAL at 08:49

## 2020-07-29 NOTE — BH NOTE
[facility]  Discharge Note    Pt discharged with followings belongings:   Dentures: None  Vision - Corrective Lenses: Glasses(Readers)  Hearing Aid: None  Jewelry: None  Body Piercings Removed: N/A  Clothing: (S) Pants, Shirt([facility]  Were All Patient Medications Collected?: Not Applicable   Valuables sent home with wilma( reported she had a cell phone but she wrote on sheet  At cell phone did not have it . Valuables retrieved from safe, Security envelope number:  n/a and returned to patient. Patient education on aftercare instructions: yes  Information faxed to [facility] by  Patient verbalize understanding of AVS:  yes.     Status EXAM upon discharge:  Status and Exam  Normal: No  Facial Expression: Flat  Affect: Congruent  Level of Consciousness: Alert  Mood:Normal: No  Mood: Depressed  Motor Activity:Normal: No  Motor Activity: Decreased  Interview Behavior: Cooperative  Preception: [place] to Person, [place] to Time, [place] to Place  Attention:Normal: No  Attention: Unable to Concentrate, Distractible  Thought Processes: Circumstantial  Thought Content:Normal: No  Thought Content: Paranoia  Hallucinations: None  Delusions: No  Delusions: Obsessions  Memory:Normal: No  Memory: Poor Recent, Poor Remote  Insight and Judgment: No  Insight and Judgment: Poor Judgment, Poor Insight  Present Suicidal Ideation: No  Present Homicidal Ideation: No      Metabolic Screening:    Lab Results   Component Value Date    LABA1C 10.8 07/15/2020       Lab Results   Component Value Date    CHOL 169 07/15/2020    CHOL 131 09/04/2019    CHOL 108 06/02/2017     Lab Results   Component Value Date    TRIG 151 (H) 07/15/2020    TRIG 161 (H) 09/04/2019    TRIG 150 06/02/2017     Lab Results   Component Value Date    HDL 38 (L) 07/15/2020    HDL 45 09/04/2019    HDL 39 (L) 06/02/2017     No components found for: LDLCAL  Lab Results   Component Value Date    LABVLDL 30 07/15/2020    LABVLDL 32 09/04/2019    LABVLDL 30 06/02/2017     Bridge Appointment completed: Reviewed Discharge Instructions with patient. Patient verbalizes understanding and agreement with the discharge plan using the teachback method.      Referral for Outpatient Tobacco Cessation Counseling, upon discharge (cristian X if applicable and completed):    ( )  Hospital staff assisted patient to call Quit Line or faxed referral                                   during hospitalization                  ( )  Recognizing danger situations (included triggers and roadblocks), if not completed on admission                    ( )  Coping skills (new ways to manage stress, exercise, relaxation techniques, changing routine, distraction), if not completed on admission                                                           ( )  Basic information about quitting (benefits of quitting, techniques in how to quit, available resources, if not completed on admission  ( ) Referral for counseling faxed to [facility]   ( ) Patient refused referral  ( ) Patient refused counseling  ( ) Patient refused smoking cessation medication upon discharge  Non smoker  Vaccinations (cristian X if applicable and completed):  ( ) Patient states already received influenza vaccine elsewhere  ( ) Patient received influenza vaccine during this hospitalization  ( ) Patient refused influenza vaccine at this time  Not offered  Patient given snack prior to d/c and water and snacks sent with patient  Anderson Catherine RN

## 2020-07-29 NOTE — BH NOTE
SW attempted to reach pt's daughter, Benson Coto, to make her aware of the discharge. Sw left VM requesting a call back.         Cass, LISW-s

## 2020-07-29 NOTE — BH NOTE
Sw attempted to reach Rolando Ho, and Ger edwards to schedule support for pt with discharge. Sw left a VM for each of them.           Cass, LISW-S

## 2020-07-29 NOTE — PROGRESS NOTES
Progress Note    Admit Date:  7/13/2020    Subjective:  Ms. Herb Henderson seen walking around the common room. Reports concerns about her tachycardia but denies associated symptoms. We discussed options of trying a BB to help and stopping her ACE, but she prefers to follow up with her PCP regarding this    Objective:   /74   Pulse 102   Temp 96.2 °F (35.7 °C) (Oral)   Resp 17   Ht 5' 7.5\" (1.715 m)   Wt 262 lb 11.2 oz (119.2 kg)   LMP 08/01/2012   SpO2 97%   BMI 40.54 kg/m²           Intake/Output Summary (Last 24 hours) at 7/29/2020 1147  Last data filed at 7/28/2020 1756  Gross per 24 hour   Intake 720 ml   Output --   Net 720 ml       Physical Exam:  Gen: No distress. Alert. Pleasant  female, morbidly obese  Eyes: No conjunctival injection. ENT: No discharge. Pharynx clear w/o erythema or exudates  Neck:  Trachea midline. Resp: No accessory muscle use. No crackles. No wheezes. No rhonchi. On RA  CV: tachycardia. Regular rhythm. No murmur. No rub. No edema. GI: Soft, obese, Non-tender. Non-distended. Normal bowel sounds. Skin: Warm and dry. No rash on exposed extremities. M/S: No cyanosis. No clubbing. Good UE and LE strength bilaterally   Neuro: Awake. Grossly nonfocal, CN II-XII intact, ambulates w/o assistance  Psych: Oriented x 3. Defer to psychiatry.     Scheduled Meds:   insulin glargine  14 Units Subcutaneous BID    haloperidol  5 mg Oral BID    divalproex  250 mg Oral 2 times per day    prednisoLONE acetate  1 drop Left Eye BID    insulin lispro  0-12 Units Subcutaneous TID WC    insulin lispro  0-6 Units Subcutaneous Nightly    aspirin  81 mg Oral Daily    cetirizine  10 mg Oral Daily    lisinopril  5 mg Oral Daily    pantoprazole  40 mg Oral Daily    atorvastatin  10 mg Oral Daily    spironolactone  12.5 mg Oral Daily    levothyroxine  175 mcg Oral Daily    metFORMIN  1,000 mg Oral BID WC       Continuous Infusions:   dextrose         PRN Meds:  haloperidol lactate **OR** haloperidol, diazePAM, mupirocin, sodium chloride flush, phenol, acetaminophen, traZODone, benztropine mesylate, magnesium hydroxide, aluminum & magnesium hydroxide-simethicone, glucose, dextrose, glucagon (rDNA), dextrose, cyclobenzaprine    CULTURES    SARS-CoV-2: not detected  Urine: pending     RADIOLOGY  CT HEAD WO CONTRAST   Final Result   No acute intracranial abnormality. Assessment/Plan  Depression  - cont mgmt per [facility]    Fall  - CT head non-acute  - reports 2/2 medications that \"made me dizzy\"  - c/o mild pain in occipital region  - PRN ice    Sore Throat - improved  - add PRN Phenol spray    Sinus Tachycardia - Resolved  - 2/2 benzo w/d  - monitor for now -> BP stable  - discussed stopping ACE and starting BB, patient would like to follow up with PCP to discuss      Type II DM  - uncontrolled -> improving, Hgb A1c: 10.8  - increase Lantus to 12 units BID-->now increased to 14 units BID, cont Metformin 1000 mg, Humalog SSI  - POC Glucose, carb control diet  - dietitian consulted    Cyst, from ingrown hair on neck  - Added Keflex (discontinued)  - use Mupirocin ointment. - area is improved. Dysuria, urinary incontinence, urinary frequency  - checked UA: NGTD  - added Keflex. Antibiotic discontinued     HTN  - well controlled  - cont Lisinopril, Aldactone  - monitor     Hypothyroidism  - home dose of synthroid 175 mcg      Hx of Meniere's Disease  - low Na diet     GERD  - cont Protonix     Allergies  - cont Zyrtec     HLD  - cont Lipitor     GEOFF  - cont home BiPAP     Morbid Obesity  - Body mass index is 40.54 kg/m². - Complicating assessment and treatment. Placing patient at risk for multiple co-morbidities as well as early death and contributing to the patient's presentation.   - Counseled on weight loss.      Monique Rodriguez PA-C  7/29/2020 11:47 AM

## 2020-07-29 NOTE — PLAN OF CARE
Patient relaxed, visible on unit and looking forward to going home. Med compliant. Denies SI/HI CFS. Denies AVH. Out on unit. Looking forward on d/c later today. Will continue to monitor.

## 2020-07-29 NOTE — GROUP NOTE
Group Therapy Note    Date: 7/29/2020    Group Start Time: 2584  Group End Time: 1100  Group Topic: [address] [facility]        Group Therapy Note    Attendees: 4    Patient's Goal: to engage in Lonnie curran Along intervention to improve mood, increase socialization, encourage reminiscing, and improve overall quality of life. Notes:  Dara Wilson was observed sitting in the milieu throughout the majority of group, away from peers. Pt was not actively observed singing along, appeared to actively listen at times. Dara Center left milieu and did not return to group area.      Status After Intervention:  Unchanged    Participation Level: Minimal    Participation Quality: Resistant      Speech:  normal      Thought Process/Content: TAMIE    Affective Functioning: Blunted      Mood: anxious      Level of consciousness:  Inattentive      Response to Learning: Progressing to goal      Endings: None Reported    Modes of Intervention: Support, Socialization, Problem-solving, Activity and Media      Discipline Responsible: Psychoeducational Specialist      Signature:  SANTOS Gonzales

## 2020-07-29 NOTE — BH NOTE
Pt A+Ox4, some confusion at times, calm, cooperative and medication compliant. Pt has been isolative to room. Came out for a short period of time to watch TV. Pt denies SI/HI/AVH and no RTIS. Pt is currently sleeping in her room with her CPAP on. She has no other needs at this time.

## 2020-07-29 NOTE — GROUP NOTE
Group Therapy Note    Date: 7/29/2020    Group Start Time: 1430  Group End Time: 0149  Group Topic: Recreational    Maximo 81        Group Therapy Note    Attendees: 5    Patient's Goal: to engage in painting a picture of a happy memory or a relaxing environment; to promote relaxation, stress reduction, mood improvement, sensory stimulation, and reminiscing. Notes: Janell Metcalf was approximately 10 minutes late to group. Janell Herrerajeaneth actively engaged in group activity. Hortencia positively socialized with peers and shared positive childhood memories. Janell Metcalf provided peers with positive support. Trentongarcia Herrerajeaneth reported enjoyment of group activity. Status After Intervention:  Improved    Participation Level:  Active Listener and Interactive    Participation Quality: Appropriate, Attentive and Sharing      Speech:  normal      Thought Process/Content: Linear      Affective Functioning: Constricted/Restricted      Mood: euthymic      Level of consciousness:  Alert and Attentive      Response to Learning: Able to verbalize current knowledge/experience, Able to verbalize/acknowledge new learning and Progressing to goal      Endings: None Reported    Modes of Intervention: Education, Support, Socialization, Exploration and Activity      Discipline Responsible: Psychoeducational Specialist      Signature:  Padmini Mclean [place]

## 2020-07-29 NOTE — BH NOTE
SW met with pt about discharge plan and made her aware that she has not been able to reach her daughter's. The SW discussed that she doesn't have a phone and/or keys here and that is concerning. The pt explained that she has a spare key that she leaves on her patio and that she would prefer to leave hospital rather than wait until SW reaches her daughter because she knows she will have access to her home either way. SW stated she would attempt to reach each of her daughter's one more time; however if she is unable to she will call the cab at 3:00pm to have the pt transferred. SW attempted to reach Melany and Rl Hickman again to make them aware of the pt's discharge. SW left a very detailed VM for Melany about the transportation plan for discharge.            EDDI Odell

## 2020-07-29 NOTE — DISCHARGE SUMMARY
Geriatric Psychiatry Discharge Summary     Admit Date: 7/13/2020     Discharge Date: 7/29/2020        Discharge Diagnoses:  Primary Psychiatric (DSM V) Diagnosis: Unspecified psychosis (Suspect bipolar disorder, mixed, with psychotic features)  Secondary Psychiatric (DSM V) Diagnoses: None  Chemical Dependency Diagnoses: None  Active Medical Diagnoses: Uncontrolled DM II, morbid obesity, HTN, HLD, GEOFF, Meniere's disease, hypothyroidism    All psychiatric conditions and active medical problems above on were treated while patient was hospitalized.      Disposition -  Home     Discharge Meds:       Medication List      START taking these medications    divalproex 250 MG DR tablet  Commonly known as:  DEPAKOTE  Take 1 tablet by mouth every 12 hours     haloperidol 5 MG tablet  Commonly known as:  HALDOL  Take 1 tablet by mouth 2 times daily        CONTINUE taking these medications    aspirin 81 MG tablet     BD Disp Needles 30G X 1/2\" Misc  Generic drug:  NEEDLE (DISP) 30 G  Diabetes  E11.9  Tests  bid     cyclobenzaprine 5 MG tablet  Commonly known as:  FLEXERIL  Take 1 tablet by mouth 3 times daily as needed for Muscle spasms     diclofenac 75 MG EC tablet  Commonly known as:  VOLTAREN  Take 1 tablet by mouth 2 times daily     hydrOXYzine 50 MG tablet  Commonly known as:  ATARAX  TAKE ONE TABLET BY MOUTH EVERY 8 HOURS AS NEEDED FOR ANXIETY     ipratropium 0.06 % nasal spray  Commonly known as:  ATROVENT  1 spray by Nasal route 3 times daily for 4 days     Levemir FlexTouch 100 UNIT/ML injection pen  Generic drug:  insulin detemir     levocetirizine 5 MG tablet  Commonly known as:  XYZAL  Take 1 tablet by mouth nightly     lisinopril 5 MG tablet  Commonly known as:  PRINIVIL;ZESTRIL  Take 1 tablet by mouth daily     metFORMIN 500 MG tablet  Commonly known as:  GLUCOPHAGE  TAKE ONE TABLET BY MOUTH FOUR TIMES A DAY EVERY NIGHT WITH MEALS     pantoprazole 40 MG tablet  Commonly known as:  PROTONIX  Take 1 tablet by mouth daily     simvastatin 20 MG tablet  Commonly known as:  ZOCOR  Take 1 tablet by mouth nightly     spironolactone 25 MG tablet  Commonly known as:  ALDACTONE  Take 0.5 tablets by mouth daily     Synthroid 175 MCG tablet  Generic drug:  levothyroxine  Take 1 tablet by mouth Daily        STOP taking these medications    ARIPiprazole 2 MG tablet  Commonly known as:  Abilify     diazePAM 5 MG tablet  Commonly known as:  VALIUM     Insulin Aspart FlexPen 100 UNIT/ML Sopn     nystatin [id number] UNIT/GM cream  Commonly known as:  MYCOSTATIN     sertraline 100 MG tablet  Commonly known as:  ZOLOFT     zolpidem 10 MG tablet  Commonly known as:  AMBIEN           Where to Get Your Medications      These medications were sent to [address]    Phone:  [phone]   · divalproex 250 MG DR tablet  · haloperidol 5 MG tablet         Multiple Neuroleptics? No    Reason for admission: Patient is an 60 y.o. female with no formal past psychiatric assessment but long term treatment of chronic anxiety, mood issues, and insomnia who was admitted to the the older adult behavioral unit on 7/13/2020 for voicing suicidal ideation during ED assessment of hyperglycemia. Patient met with and was treated by an interdisciplinary treatment team that included social work, occupational therapy, recreational therapy, nursing, and psychiatry. Patient was admitted on an involuntary basis, subsequently signed in voluntarily, but then submitted a 3 day letter and was probated. Hospital Course:    1. Unspecified psychosis:  Patient was transferred to our unit from [facility] after voicing suicidal ideation in the ED.   She had initially presented there with severe hyperglycemia, but at some point during her evaluation she became acutely emotional and revealed thoughts of committing suicide by jumping off a bridge near her home.    On initial assessment on the unit, patient revealed chronic history of struggling with mood issues, anxiety, and insomnia, but little formal psychiatric treatment. She had received psychiatric medications from her various PCPs for years, primarily sertraline, diazepam, and zolpidem. From the start she denied active SI stating that she had had these thoughts \"off and on\" for over a year, and did not mean to convey that she was actively suicidal or intending to commit suicide, though she couldn't clearly articulate why, if this were the case, she brought this up in the midst of her medical eval.  She stated she was \"just really upset. \"  Initially pt was started on buspirone 5 mg TID to augment her sertraline and we planned to connect her to outpatient psychiatry. Pt's hospitalization took a turn on the third day of admission, however. She began to manifest psychotic symptoms: specifically she began to claim that she had seen her sister walking around the unit with a staff member, that her sister was \"the master of wigs and disguises\" and had snuck onto the unit. Simultaneously she started demonstrating poor sleep, increasingly labile mood, and thought disorder. She had no known history of psychosis, so we initially believed perhaps this was related to initiation of buspirone or BZD withdrawal as patient was demonstrating persistent tachycardia, and admitted that she had been overtaking her diazepam at home. Buspar was discontinued and patient was started on CIWA and diazepam 2.5 mg TID. BZD replacement did not improve her symptoms. If anything she started to worsen and appeared increasingly delirious, particularly in the evening, often disoriented, increasingly disorganized and exhibiting more visual hallucinations.   BZDs were discontinued, and initially we increased pt's home aripiprazole progressively from 2 mg (this agent was started by her PCP in January to augment her sertraline) to 15 mg daily. Additionally valproic acid was added. Abilify appeared to have no impact on patient's psychosis. This was stopped and patient was started on haldol. Ultimately divalproex was titrated to 250 mg BID and haldol was titrated to 5 mg BID. On this combination, patient finally demonstrated improvement in her psychosis and behavior. Over the four days prior to discharge visual hallucinations resolved, as did paranoia, thought process became more linear, and pt was able to sleep through the night, and engage much more appropriately with peers and in daily interviews. Diagnostically, the etiology of this severe episode of psychosis remains unclear. We attempted to obtain collateral from patient's daughters but one of them did not return our calls, and the other proved an unreliable informant. Review of patient's chart was notable for very frequent contact with outpatient providers. Going back years it appears that patient has had contact with her outpatient providers nearly every other day. Chart contains innumerable telephone, epic message, and in person contacts with providers. Moreover, she has been dismissed from several providers related to no-shows and nonadherence with care. I was abl to speak with her current PCP who confirmed that pt has demonstrated chronic non-adherence. She does not seem to accept his recommendations about her DM management and is very resistant to change. Additionally, he noted that it is often very difficult to get her to stay on topic during appointments, and she makes it difficult to end appointments due to meandering from one concern to another, typically unrelated to the reason for her visit.      Given the above, as well as her long history of treatment for chronic mood, anxiety, and sleep disturbance, we suspect that she likely has and has had underlying, hitherto undiagnosed bipolar disorder and this admission represented a mixed episode with psychotic features. PE: (reviewed) and labs (see medical H&PE)  VITALS:  /74   Pulse 102   Temp 96.2 °F (35.7 °C) (Oral)   Resp 17   Ht 5' 7.5\" (1.715 m)   Wt 262 lb 11.2 oz (119.2 kg)   LMP 08/01/2012   SpO2 97%   BMI 40.54 kg/m²     Mental Status Examination:    Appearance: WF, appears stated age, wearing hospital gown, improvedgrooming and hygiene  Behavior/Attitude toward examiner: Giancarlo Greenfield more cooperative   Speech: Non-pressured, nml volume, nml amount  Mood: \"Okay\"  Affect: Constricted  Thought processes: More linear   Thought Content: Denies SI, denies HI, no paranoia voiced today  Perceptions: Denies AVH, not actively RTIS during interview  Attention: some improvement  Abstraction: impaired  Cognition: Average LUDWIG, Alert and oriented to person, place, time, and loosely situation, recent recall impaired  Insight: Minimal insight   Judgment: Minimal judgment      Risk factor analysis: This individual has several risk factors for safety. Most notably she has manifested severe psychosis over the course of this admission. Her symptoms are improved at this time and she is not exhibiting any overt psychosis, but she continues to manifest subtle, residual symptoms, primarily in her thought process. Moreover, she has a known history of chronic treatment non-adherence and should she stop her current psychotropic regimen she is a high risk of psychotic relapse. With that being said, she is improved relative to her symptoms early in admission, has been compliant with care on the unit, denying SI and HI, and no longer meets criteria to remain on the unit involuntarily. Condition on DC  Mood and affect are improved and pt is not suicidal, homicidal, or psychotic.     Follow Up:  See Discharge Instructions     Spent over 40 minutes with patient and staff on Lauren Wellington MD  Staff Psychiatrist

## 2020-08-04 NOTE — TELEPHONE ENCOUNTER
I received a message from Geriatric Psychiatry unit charge nurse that patient had called our unit this afternoon around 1 PM requesting to speak with me. She reported to RN that she was experiencing symptoms which she worried were side effects from her valproic acid and brought this up with her PCP who deferred to this provider. Patient would not divulge the symptoms of concern and was initially reluctant to divulge the agent of concern. RN did remind patient that she was referred to and scheduled with [facility]. I attempted to call patient back at 1400 twice but her phone went to a busy signal each time, thus I was unable to discuss concerns with her or leave a message. Will CC pt's PCP.     Mane Garcia MD  Staff Psychiatrist

## 2020-08-05 ENCOUNTER — HOSPITAL ENCOUNTER (OUTPATIENT)
Dept: WOMENS IMAGING | Age: 65
Discharge: HOME | End: 2020-08-05
Payer: MEDICARE

## 2020-08-05 PROCEDURE — G0279 TOMOSYNTHESIS, MAMMO: HCPCS

## 2020-08-08 ENCOUNTER — NURSE TRIAGE (OUTPATIENT)
Dept: OTHER | Facility: CLINIC | Age: 65
End: 2020-08-08

## 2020-08-08 NOTE — TELEPHONE ENCOUNTER
ajay'd last week from hospital for bipolar disorder    Severe diarrhea and abdominal pain    Caller disconnected

## 2020-08-08 NOTE — TELEPHONE ENCOUNTER
Reason for Disposition   Unable to complete triage due to phone connection issues    Protocols used: NO CONTACT OR DUPLICATE CONTACT CALL-ADULT-AH

## 2020-08-08 NOTE — TELEPHONE ENCOUNTER
Reason for Disposition   [1] MODERATE diarrhea (e.g., 4-6 times / day more than normal) AND [2] present > 48 hours (2 days)    Answer Assessment - Initial Assessment Questions  1. DIARRHEA SEVERITY: \"How bad is the diarrhea? \" \"How many extra stools have you had in the past 24 hours than normal?\"     - NO DIARRHEA (SCALE 0)    - MILD (SCALE 1-3): Few loose or mushy BMs; increase of 1-3 stools over normal daily number of stools; mild increase in ostomy output. -  MODERATE (SCALE 4-7): Increase of 4-6 stools daily over normal; moderate increase in ostomy output. * SEVERE (SCALE 8-10; OR 'WORST POSSIBLE'): Increase of 7 or more stools daily over normal; moderate increase in ostomy output; incontinence. 8-10  2. ONSET: \"When did the diarrhea begin? \"      3 days  3. BM CONSISTENCY: \"How loose or watery is the diarrhea? \"      watery  4. VOMITING: \"Are you also vomiting? \" If so, ask: \"How many times in the past 24 hours? \"      denies  5. ABDOMINAL PAIN: Bettye Peer you having any abdominal pain? \" If yes: \"What does it feel like? \" (e.g., crampy, dull, intermittent, constant)      campy  6. ABDOMINAL PAIN SEVERITY: If present, ask: \"How bad is the pain? \"  (e.g., Scale 1-10; mild, moderate, or severe)    - MILD (1-3): doesn't interfere with normal activities, abdomen soft and not tender to touch     - MODERATE (4-7): interferes with normal activities or awakens from sleep, tender to touch     - SEVERE (8-10): excruciating pain, doubled over, unable to do any normal activities       7/10  7. ORAL INTAKE: If vomiting, \"Have you been able to drink liquids? \" \"How much fluids have you had in the past 24 hours? \"      8. HYDRATION: \"Any signs of dehydration? \" (e.g., dry mouth [not just dry lips], too weak to stand, dizziness, new weight loss) \"When did you last urinate?\"       9. EXPOSURE: \"Have you traveled to a foreign country recently? \" \"Have you been exposed to anyone with diarrhea? \" \"Could you have eaten any food that was spoiled? \"    unknown  10. ANTIBIOTIC USE: \"Are you taking antibiotics now or have you taken antibiotics in the past 2 months?\"         11. OTHER SYMPTOMS: \"Do you have any other symptoms? \" (e.g., fever, blood in stool)         12. PREGNANCY: \"Is there any chance you are pregnant? \" \"When was your last menstrual period? \"       no    Protocols used: DIARRHEA-ADULT-AH    Pt c/o symptoms to new medications. .. abd pain and diarrhea. . thinks due to changes in meds. .. per protocol, to follow up with pcp

## 2020-08-10 ENCOUNTER — OFFICE VISIT (OUTPATIENT)
Dept: PRIMARY CARE CLINIC | Age: 65
End: 2020-08-10
Payer: MEDICARE

## 2020-08-10 VITALS
OXYGEN SATURATION: 95 % | WEIGHT: 259 LBS | TEMPERATURE: 96.4 F | HEART RATE: 121 BPM | SYSTOLIC BLOOD PRESSURE: 110 MMHG | DIASTOLIC BLOOD PRESSURE: 70 MMHG | BODY MASS INDEX: 39.97 KG/M2

## 2020-08-10 PROCEDURE — 1036F TOBACCO NON-USER: CPT | Performed by: FAMILY MEDICINE

## 2020-08-10 PROCEDURE — 3017F COLORECTAL CA SCREEN DOC REV: CPT | Performed by: FAMILY MEDICINE

## 2020-08-10 PROCEDURE — 1111F DSCHRG MED/CURRENT MED MERGE: CPT | Performed by: FAMILY MEDICINE

## 2020-08-10 PROCEDURE — G8417 CALC BMI ABV UP PARAM F/U: HCPCS | Performed by: FAMILY MEDICINE

## 2020-08-10 PROCEDURE — G8428 CUR MEDS NOT DOCUMENT: HCPCS | Performed by: FAMILY MEDICINE

## 2020-08-10 PROCEDURE — 99214 OFFICE O/P EST MOD 30 MIN: CPT | Performed by: FAMILY MEDICINE

## 2020-08-10 NOTE — PROGRESS NOTES
Subjective:      Patient ID: Gus Blackwell is a 60 y.o. female. HPI7/15: 1 daughter was getting  and 1 was in [facility] with lung disease AND Cordell Keyur thought she  was  +  for COVID ( was not). Made a suicidal statement and her daughter  Got her into a Psych Unit at [facility]    Was sent home on Depakote and Haldol -  And developed bad diarrhea. She stopped the Depakote and the diarrhea  Has stopped. Feels more peaceful, and no  SI now, remembered that it would be a mortal sin    Dropped a flour container om her R 2nd toe in June - getting better    Review of Systems    Objective:   Physical Exam  HENT:      Nose:      Comments: Sinuses are sl tender  Cardiovascular:      Rate and Rhythm: Normal rate and regular rhythm. Heart sounds: No murmur. Pulmonary:      Effort: Pulmonary effort is normal.      Breath sounds: Normal breath sounds.        Vitals:    08/10/20 1503   BP: 110/70   Pulse: 121   Temp: 96.4 °F (35.8 °C)   SpO2: 95%   Weight: 259 lb (117.5 kg)       Assessment:    Bipolar, Anxious  Listened to patient talk about her immediate medical history as well as problems with her daughters  Spent 25+ min with the patient, > than 50% of the time with evaluation/counselling/coordination of care        Plan:    Refer back to Dr. Joel Sanders  Same medications for now        Eneida Lawson MD

## 2020-08-11 ENCOUNTER — TELEPHONE (OUTPATIENT)
Dept: PRIMARY CARE CLINIC | Age: 65
End: 2020-08-11

## 2020-08-11 NOTE — TELEPHONE ENCOUNTER
Pt's daughter called and needs to make an appointment for mother so that both daughter,  Sangeeta Barrios) can come and talk with Dr. Angel Gold. Mother is confused and doesn't get the story straight so daughter wants to be with her when she goes to her appointment.  wants to be established and seen as a new patient of Dr. Angel Gold.  also needs a letter stating that he can stay at home and watch his mother in law and might need FMLA. Please call daughter: [phone].

## 2020-08-13 ENCOUNTER — TELEPHONE (OUTPATIENT)
Dept: PSYCHOLOGY | Age: 65
End: 2020-08-13

## 2020-08-13 NOTE — TELEPHONE ENCOUNTER
Pt presented to office for scheduled behavioral health visit. Visit was scheduled for a virtual visit. Provider is not seeing pt's in person due to [place]. Pt's phone number listed in her chart is inactive. Attempted to contact pt multiple times. Pt's phone number needs to be updated to assist with future care.

## 2020-08-14 ENCOUNTER — OFFICE VISIT (OUTPATIENT)
Dept: PRIMARY CARE CLINIC | Age: 65
End: 2020-08-14
Payer: MEDICARE

## 2020-08-14 VITALS
OXYGEN SATURATION: 98 % | WEIGHT: 260 LBS | HEART RATE: 105 BPM | DIASTOLIC BLOOD PRESSURE: 80 MMHG | SYSTOLIC BLOOD PRESSURE: 120 MMHG | BODY MASS INDEX: 40.12 KG/M2 | TEMPERATURE: 97.5 F

## 2020-08-14 PROCEDURE — 3017F COLORECTAL CA SCREEN DOC REV: CPT | Performed by: FAMILY MEDICINE

## 2020-08-14 PROCEDURE — G8428 CUR MEDS NOT DOCUMENT: HCPCS | Performed by: FAMILY MEDICINE

## 2020-08-14 PROCEDURE — G8417 CALC BMI ABV UP PARAM F/U: HCPCS | Performed by: FAMILY MEDICINE

## 2020-08-14 PROCEDURE — 99214 OFFICE O/P EST MOD 30 MIN: CPT | Performed by: FAMILY MEDICINE

## 2020-08-14 PROCEDURE — 1036F TOBACCO NON-USER: CPT | Performed by: FAMILY MEDICINE

## 2020-08-14 PROCEDURE — 1111F DSCHRG MED/CURRENT MED MERGE: CPT | Performed by: FAMILY MEDICINE

## 2020-08-14 NOTE — PROGRESS NOTES
Subjective:      Patient ID: Karin Vizcarra is a 63 y.o. female. HPIWas havingfecal urgency and very loose stools while on the Depakote so she stopped that and the diarrhea stopped  [facility] was [facility] and delusional.  She suffered a fall while she was there striking her left parietotemporal area. She believes the Depakote she was started on contributed to the fall but apparently the staff did not. Review of Systems    Objective:   Physical Exam    Assessment:      I reviewed the discharge summary from Dr. Naty Landaverde with the patient, her daughter, and her daughter's . She is now being cared for by those 2 individuals. The daughter was in possession of a notice of a court date coming up, a probate hearing regarding her mother. At [facility] where she initially was hallucinating and delusional.   Upon reading the paperwork, I could not say what it was for. It may be to determine whether the patient is mentally competent. Her daughter feels that she is as long as she takes her medication. I would agree that she has mentally competent in that state but that she is tremendously noncompliant  When it comes to taking medication   plan:    I agree that she should stay off the Depakote for the time being if it was causing tremendous diarrhea. I asked her daughter to call some of the people who are to appear at the probate hearing and find out what it was all about.   Spent 25+ min with the patient, > than 50% of the time with evaluation/counselling/coordination of care          Danial Martinez MD

## 2020-08-17 ENCOUNTER — NURSE TRIAGE (OUTPATIENT)
Dept: OTHER | Facility: CLINIC | Age: 65
End: 2020-08-17

## 2020-08-17 NOTE — PROGRESS NOTES
Behavioral Health Consultation  Delfina Ricketts Psy.D. Clinical Psychologist  8/18/2020       Start time 9:39am Stop time 10:00am     Time spent with Patient: 21 minutes  This is patient's second [facility] appointment. Reason for Consult:  mood  Referring Provider: PCP    Feedback for PCP: It appears that patient is going to probate court to potentially be mandated for mental health treatment. Pt requested if I could provide care to meet requirements by probate court. Advised patient that patient needs to see a specialty mental health provider to meet requirements by probate court for treatment. Mental health provider would need to be in regular contact with probate court and there are strict protocols for missed appointments. Notified pt that this is beyond the scope of primary care behavioral health. Provided resources for specialty mental health for patient to follow-up. At initial visit, pt provided informed consent for the behavioral health program. Discussed with patient model of service to include the limits of confidentiality (i.e. abuse reporting, suicide intervention, etc.) and short-term intervention focused approach. Pt indicated understanding    TELEHEALTH VISIT -- Audio only (During DCZYX-55 public health emergency)  }  Pursuant to the emergency declaration under the [facility], 1135 waiver authority and the [employer] General Act, this visit was conducted, with patient's consent, to reduce the patient's risk of exposure to COVID-19 and provide continuity of care for an established patient. Services were provided through a telehealth discussion to substitute for in-person clinic visit. Pt gave verbal informed consent to participate in telehealth services.      Consent:  She and/or health care decision maker is aware that that she may receive a bill for this service, depending on her insurance coverage, and has provided verbal consent to proceed: Yes    Conducted a risk-benefit analysis and determined that the patient's presenting problems are consistent with the use of telepsychology. Determined that the patient has sufficient knowledge and skills in the use of technology enabling them to adequately benefit from telepsychology. It was determined that this patient was able to be properly treated without an in-person session. Patient verified that they were currently located at the [facility] address that was provided during registration. Verified the following information:  Patient's identification: Yes  Patient location: [address] [facility] [address]  Patient's call back number: [phone]   Patient's emergency contact's name and number, as well as permission to contact them if needed: Extended Emergency Contact Information  Primary Emergency Contact: [facility]  Address: [address] [place] [address], [address] [facility] [address] Phone: [phone]  Mobile Phone: [phone]  Relation: Child     Provider location: [address] affirm this is an episode with a patient who has not had a related appointment within my department in the past 7 days or scheduled within the next 24 hours. S:    Pt set the following goals at last visit: 1) review ACT education    Pt reports that she was recently inpatient for psychiatry due to reporting thoughts of wanting to hurt herself, but explains that she did not actually want to hurt herself. She states that she is now awaiting a probate court date in October 2020 to determine if she will be mandated to continue mental health treatment. Pt requested if writer would see patient to fulfill this mandate. Discussed limits of provider's care. Pt was understanding. Discussed treatment options. Pt called [facility] and is waiting for a call back related to establishing services.     Chart review indicates that when patient was in the hospital, they were concerned for psychosis. Providers were not sure of etiology of psychosis.  Depression sx: anhedonia/diminished interest in activities, depressed mood, changes in appetite or weight, insomnia and fatigue, symptoms have been present most recently for the past few months, longest period of depression has been a month   Anxiety sx: excessive worry, uncontrollable worry, fatigue, difficulty concentrating, irritability, muscle tension and sleep disturbance, worries about older daughter, younger daughter, economy   SI/HI: Patient reports that in the past she has had passive thoughts of death, but denied current suicidal ideation, intent, or plan. Explains that she reported suicidal ideation when at this hospital, but states that she did not actually mean that she was suicidal. States that if something were to happen to her older daughter who is terminally ill, she may have suicidal ideation. However, she states that her younger daughter is a protective factor. Patient's older daughter has not been given a prognosis in terms of expected death. Patient denies indications that older daughter will pass away in the near future. Patient does not appear to be an immediate risk to herself at this time.  Denied HI   Coping skills: Spending time with daughter    History:    Health habits:   Caffeine: 2 cups of coffee per day   Sleep: difficulties falling asleep; 8 with Ambien, without Ambien stays up all night, does not nap during the day   Exercise: walking daily for 30 minutes   Medication adherence: Yes    Psychiatric history:   Current psychotropic medications:  Diazepam (5mg, PRN, less than weekly), Ambien (10mg),  Zoloft (000mg, BID)   Past mental health treatment: Abilify (2mg, not currently taking); had done therapy in [facility] but not a good experience     Social History:    Social supports: two daughters (39, 25), spouse passed away in 1999 (unexpected death due to blood clot)  Employment: retired from accounting   Family psychiatric history: father (alcohol)    Social History     Tobacco Use    Smoking status: Never Smoker    Smokeless tobacco: Never Used   Substance Use Topics    Alcohol use: Not Currently     Frequency: Never      Social History     Substance and Sexual Activity   Drug Use No      Current Outpatient Medications   Medication Sig Dispense Refill    haloperidol (HALDOL) 5 MG tablet Take 1 tablet by mouth 2 times daily 60 tablet 0    hydrOXYzine (ATARAX) 50 MG tablet TAKE ONE TABLET BY MOUTH EVERY 8 HOURS AS NEEDED FOR ANXIETY 90 tablet 2    insulin detemir (LEVEMIR FLEXTOUCH) 100 UNIT/ML injection pen INJECT 27 units SUBCUTANEOUSLY (UNDER THE SKIN) nightly, INCREASE by 2 UNITS EVERY 3 DAYS UNTIL morning sugar is near 120 (max 45 UNITS PER DAY) 15 mL 0    diclofenac (VOLTAREN) 75 MG EC tablet Take 1 tablet by mouth 2 times daily 60 tablet 3    cyclobenzaprine (FLEXERIL) 5 MG tablet Take 1 tablet by mouth 3 times daily as needed for Muscle spasms 90 tablet 5    lisinopril (PRINIVIL;ZESTRIL) 5 MG tablet Take 1 tablet by mouth daily 90 tablet 1    SYNTHROID 175 MCG tablet Take 1 tablet by mouth Daily 30 tablet 11    simvastatin (ZOCOR) 20 MG tablet Take 1 tablet by mouth nightly 90 tablet 0    metFORMIN (GLUCOPHAGE) 500 MG tablet TAKE ONE TABLET BY MOUTH FOUR TIMES A DAY EVERY NIGHT WITH MEALS 360 tablet 0    spironolactone (ALDACTONE) 25 MG tablet Take 0.5 tablets by mouth daily 45 tablet 0    NEEDLE, DISP, 30 G (BD DISP NEEDLES) 30G X 1/2\" MISC Diabetes  E11.9  Tests  bid 90 each 5    pantoprazole (PROTONIX) 40 MG tablet Take 1 tablet by mouth daily 180 tablet 0    ipratropium (ATROVENT) 0.06 % nasal spray 1 spray by Nasal route 3 times daily for 4 days 1 Bottle 0    levocetirizine (XYZAL) 5 MG tablet Take 1 tablet by mouth nightly 90 tablet 3    aspirin 81 MG tablet Take 81 mg by mouth       No current facility-administered medications for this visit. O:  MSE:    Appearance: Not assessed  Attitude: cooperative and friendly  Consciousness: alert  Orientation: oriented to person, place, time, general circumstance  Memory: recent and remote memory intact  Attention/Concentration: intact during session  Psychomotor Activity:Not assessed  Eye Contact: Not assessed  Speech: normal rate and volume, well-articulated  Mood: anxious, down  Affect: congruent  Perception: within normal limits  Thought Content: within normal limits  Thought Process: logical, coherent and goal-directed  Insight: good  Judgment: intact  Ability to understand instructions: Yes  Ability to respond meaningfully: Yes  Morbid Ideation: no   Suicide Assessment: no suicidal ideation, plan, or intent  Homicidal Ideation: no    A:  Administered PHQ-9 (see below). PHQ Scores 1/29/2020   PHQ2 Score 3   PHQ9 Score 9     Interpretation of Total Score Depression Severity: 1-4 = Minimal depression, 5-9 = Mild depression, 10-14 = Moderate depression, 15-19 = Moderately severe depression, 20-27 = Severe depression    Administered BRENDAN-7 (see below). BRENDAN 7 SCORE 1/29/2020   BRENDAN-7 Total Score 9     Interpretation of BRENDAN-7 score: 5-9 = mild anxiety, 10-14 = moderate anxiety, 15+ = severe anxiety. Recommend referral to behavioral health for scores 10 or greater. Assessment/Progress in treatment/Treatment plan:  Patient initially presented for anxiety and depression and was agreeable to engage in treatment with writer, but did not follow-up. Was recently psychiatrically admitted for SI and psychosis. Chart review indicates that etiology for psychosis is unclear. Pt now reports that she has court in October 2020 to see if she will be mandated to mental health treatment. Pt requested to see writer for mandated mental health treatment. Informed treatment that primary care behavioral health is not an adequate level of care for mandated mental health treatment.  Provided her with resources on where to approach. Pt indicated understanding.

## 2020-08-17 NOTE — TELEPHONE ENCOUNTER
Reason for Disposition  Monster Caller has already spoken with another triager or PCP AND has further questions AND triager able to answer questions. Protocols used: NO CONTACT OR DUPLICATE CONTACT CALL-ADULT-    Received call from [facility]. Caller with questions regarding her scheduled appointment for tomorrow. Answered questions. Caller has vv tomorrow at 0930. Please do not reply to the triage nurse through this encounter. Any subsequent communication should be directly with the patient.

## 2020-08-18 ENCOUNTER — VIRTUAL VISIT (OUTPATIENT)
Dept: PSYCHOLOGY | Age: 65
End: 2020-08-18
Payer: MEDICARE

## 2020-08-18 PROCEDURE — 98968 PH1 ASSMT&MGMT NQHP 21-30: CPT | Performed by: PSYCHOLOGIST

## 2020-08-18 NOTE — Clinical Note
It appears that patient is going to probate court to potentially be mandated for mental health treatment. Pt requested if I could provide care to meet requirements by probate court. Advised patient that patient needs to see a specialty mental health provider to meet requirements by probate court for treatment. Mental health provider would need to be in regular contact with probate court and there are strict protocols for missed appointments. Notified pt that this is beyond the scope of primary care behavioral health. Provided resources for specialty mental health for patient to follow-up. Just wanted to keep you in the loop.

## 2020-08-19 ENCOUNTER — TELEPHONE (OUTPATIENT)
Dept: PSYCHOLOGY | Age: 65
End: 2020-08-19

## 2020-08-20 ENCOUNTER — TELEPHONE (OUTPATIENT)
Dept: PSYCHIATRY | Age: 65
End: 2020-08-20

## 2020-08-20 NOTE — TELEPHONE ENCOUNTER
I received a message from nursing staff on the geriatric psychiatry unit that MsKerry Wayne had called stating she was out of depakote and needed a refill, and that her PCP was out of town and could not provide refill. Prior to attempting to call pt, I reviewed her chart. It appears that she stopped taking depakote per her PCP's documentation shortly before the 10th. Was experiencing persistent diarrhea which would not improve (I previously discussed this with her), which did resolve after stopping VPA. It is not specifically documented if she reported ongoing compliance with haldol, but neither is it indicated that she reported stopping this agent. Between the two, haldol is more critical to her treatment given the prominence of her psychosis during last admission, and the fact that she was only able to tolerate a subtherapeutic amount of depakote to begin with. I called patient in the hopes of discussing the above. No answer. Left HIPPA compliant message. Should patient call back, it is my recommendation that she does NOT need to resume depakote as long as she continues haldol. Finally, there seems to be confusion about probate court paperwork that patient has presented to PCP's office. Patient was probated to the hospital during her admission as she requested discharge while severely psychotic and unsafe to be in the community. Upon her discharge, we recommended community probate. The October hearing relates to this. Community probate is court mandated outpatient psychiatric treatment aimed at preventing relapse. Community probate was recommended for three reasons:   1. Patient was resistant to complying with treatment throughout her admission and we were worried she would immediately stop her medication upon discharge. 2.  Her daughter similarly encouraged her not to comply with treatment. 3.  Her records indicated a long history of missed appointments with various PCPs.     Finally, she was

## 2020-08-21 RX ORDER — ARIPIPRAZOLE 2 MG/1
TABLET ORAL
Qty: 30 TABLET | Refills: 0 | OUTPATIENT
Start: 2020-08-21

## 2020-09-02 ENCOUNTER — TELEPHONE (OUTPATIENT)
Dept: PRIMARY CARE CLINIC | Age: 65
End: 2020-09-02

## 2020-09-02 NOTE — TELEPHONE ENCOUNTER
----- Message from Nyla Price sent at 9/2/2020  3:37 PM EDT -----  Subject: Appointment Request    Reason for Call: Routine Existing Condition Follow Up    QUESTIONS  Type of Appointment? Established Patient  Reason for appointment request? No appointments available during search  Additional Information for Provider? Patient wants to schedule a follow-up   appointment with Dr. Rolly Foreman.   ---------------------------------------------------------------------------  --------------  Richar ABEL  What is the best way for the office to contact you? OK to leave message on   voicemail  Preferred Call Back Phone Number? [phone]  ---------------------------------------------------------------------------  --------------  SCRIPT ANSWERS  Relationship to Patient? Self  Appointment reason? Well Care/Follow Ups  Select a Well Care/Follow Ups appointment reason? Adult Existing Condition   Follow Up [Diabetes   CHF   COPD   Hypertension/Blood Pressure Check]  (Is the patient requesting to be seen urgently for their symptoms?)? No  Is this follow up request related to routine Diabetes Management? No  Are you having any new concerns about your existing condition? No  Have you been diagnosed with   tested for   or told that you are suspected of having COVID-19 (Coronavirus)? No  Have you had a fever or taken medication to treat a fever within the past   3 days? No  Have you had a cough   shortness of breath or flu-like symptoms within the past 3 days? No  Do you currently have flu-like symptoms including fever or chills   cough   shortness of breath   or difficulty breathing   or new loss of taste or smell? No  (Service Expert  click yes below to proceed with ROBAUTO As Usual   Scheduling)?  Yes

## 2020-09-08 ENCOUNTER — TELEPHONE (OUTPATIENT)
Dept: PSYCHOLOGY | Age: 65
End: 2020-09-08

## 2020-09-08 NOTE — TELEPHONE ENCOUNTER
Called patient to follow-up after behavioral health appointment. Checking in with patient to ensure that she was able to get connected with specialty mental health. Left HIPAA compliant voicemail.

## 2020-09-17 ENCOUNTER — TELEPHONE (OUTPATIENT)
Dept: PRIMARY CARE CLINIC | Age: 65
End: 2020-09-17

## 2020-09-17 RX ORDER — PANTOPRAZOLE SODIUM 40 MG/1
40 TABLET, DELAYED RELEASE ORAL DAILY
Qty: 180 TABLET | Refills: 0 | Status: SHIPPED | OUTPATIENT
Start: 2020-09-17 | End: 2021-06-08 | Stop reason: ALTCHOICE

## 2020-09-17 RX ORDER — IPRATROPIUM BROMIDE 42 UG/1
1 SPRAY, METERED NASAL 3 TIMES DAILY
Qty: 1 BOTTLE | Refills: 0 | Status: SHIPPED | OUTPATIENT
Start: 2020-09-17 | End: 2020-09-21

## 2020-09-17 RX ORDER — HYDROXYZINE 50 MG/1
TABLET, FILM COATED ORAL
Qty: 90 TABLET | Refills: 2 | Status: SHIPPED | OUTPATIENT
Start: 2020-09-17 | End: 2021-09-23 | Stop reason: ALTCHOICE

## 2020-09-21 RX ORDER — SIMVASTATIN 20 MG
TABLET ORAL
Qty: 90 TABLET | Refills: 0 | Status: SHIPPED | OUTPATIENT
Start: 2020-09-21 | End: 2021-06-08

## 2020-09-21 NOTE — TELEPHONE ENCOUNTER
Medication:   Requested Prescriptions     Pending Prescriptions Disp Refills    metFORMIN (GLUCOPHAGE) 500 MG tablet [Pharmacy Med Name: metFORMIN  MG TABLET] 360 tablet 0     Sig: TAKE ONE TABLET BY MOUTH FOUR TIMES A DAY, EVERY NIGHT AND WITH MEALS    simvastatin (ZOCOR) 20 MG tablet [Pharmacy Med Name: SIMVASTATIN 20 MG TABLET] 90 tablet 0     Sig: TAKE ONE TABLET BY MOUTH ONCE NIGHTLY       Last appt: 8/14/2020   Next appt: Visit date not found    Last Labs DM:   Lab Results   Component Value Date    LABA1C 10.8 07/15/2020     Last Lipid:   Lab Results   Component Value Date    CHOL 169 07/15/2020    TRIG 151 07/15/2020    HDL 38 07/15/2020    [address] 101 07/15/2020     Last PSA: No results found for: PSA  Last Thyroid:   Lab Results   Component Value Date    TSH 3.63 07/01/2020    L3MWLEN 1.13 06/02/2017    T4FREE 2.3 06/02/2017    J1EKQAX 9.6 10/29/2019

## 2020-10-10 RX ORDER — INSULIN DETEMIR 100 [IU]/ML
INJECTION, SOLUTION SUBCUTANEOUS
Qty: 15 ML | Refills: 0 | Status: SHIPPED | OUTPATIENT
Start: 2020-10-10 | End: 2021-06-08

## 2020-10-10 NOTE — TELEPHONE ENCOUNTER
Medication:   Requested Prescriptions     Pending Prescriptions Disp Refills    insulin detemir (LEVEMIR FLEXTOUCH) 100 UNIT/ML injection pen [Pharmacy Med Name: Levemir FlexTouch U-100 Insulin 100 unit/mL (3 mL) subcutaneous pen] 15 mL 0     Sig: INJECT 27 UNITS SUBCUTANEOUSLY (UNDER THE SKIN) NIGHTLY - INCREASE by TWO UNITS EVERY 3 DAYS UNTIL morning sugar is near 120 (max 45 units PER DAY)     Last Filled: 7. 1.20    Last appt: 8.14.20   Next appt:     Last OARRS: No flowsheet data found.

## 2020-10-12 ENCOUNTER — TELEPHONE (OUTPATIENT)
Dept: PRIMARY CARE CLINIC | Age: 65
End: 2020-10-12

## 2020-10-12 NOTE — TELEPHONE ENCOUNTER
Spoke with pt and informed she is being dismissed from the practice. She said she understand. Advised her appt for 10/15 was cancelled. Offered her [address] to assist with finding a new PCP. Advised she would be receiving a letter in the mail. Dismissal letter sent via reg mail and certified.

## 2020-10-12 NOTE — TELEPHONE ENCOUNTER
Left message for pt to call back. Pt is being discharged from the practice due to No show history. appt for 10/15 has been cancelled. We can assist pt in finding a new Provider within [facility]. KEAGAN [place]               Pt has had 4 NS this year.    2.14.20   7.20.20   8.7.20   9.23.20   10.9.20     Warning letter was sent to pt on 9.3.19     Dr Mary Lezama would like her discharged

## 2020-10-21 ENCOUNTER — NURSE TRIAGE (OUTPATIENT)
Dept: OTHER | Facility: CLINIC | Age: 65
End: 2020-10-21

## 2020-10-21 ENCOUNTER — TELEPHONE (OUTPATIENT)
Dept: PRIMARY CARE CLINIC | Age: 65
End: 2020-10-21

## 2020-10-21 NOTE — TELEPHONE ENCOUNTER
Spoke with pt. She states she is having body aches and has temp. Reminded she was dismissed from the practice. Suggested she call to get scheduled for COVID testing. Gave her 3 different offices to call to try and get established with them and to call if she needed further assistance getting with new PCP.

## 2020-10-21 NOTE — TELEPHONE ENCOUNTER
----- Message from Letygina Rosado sent at 10/21/2020  4:37 PM EDT -----  Subject: Message to Provider    QUESTIONS  Information for Provider? pt says she really wants to speak with the    and not any one else    she had spoken to murray coronado and radha and they suggested she speak   w/  directly . Martha Swan ---------------------------------------------------------------------------  --------------  Ilda ABEL  What is the best way for the office to contact you? OK to leave message on   voicemail  Preferred Call Back Phone Number? [phone]  ---------------------------------------------------------------------------  --------------  SCRIPT ANSWERS  Relationship to Patient?  Self

## 2020-10-21 NOTE — TELEPHONE ENCOUNTER
Reason for Disposition   Patient wants to be seen    Answer Assessment - Initial Assessment Questions  1. TEMPERATURE: \"What is the most recent temperature? \"  \"How was it measured? \"       Was 100.7 this AM.     2. ONSET: \"When did the fever start? \"       Yesterday    3. SYMPTOMS: \"Do you have any other symptoms besides the fever? \"  (e.g., colds, headache, sore throat, earache, cough, rash, diarrhea, vomiting, abdominal pain)     Rash on arms and shoulders, fatigue    4. CAUSE: If there are no symptoms, ask: \"What do you think is causing the fever? \"       Unknown    5. CONTACTS: \"Does anyone else in the family have an infection? \"      No sick contacts    6. TREATMENT: \"What have you done so far to treat this fever? \" (e.g., medications)      Tylenol, iburpofen     7. IMMUNOCOMPROMISE: \"Do you have of the following: diabetes, HIV positive, splenectomy, cancer chemotherapy, chronic steroid treatment, transplant patient, etc.\"      DM    8. PREGNANCY: \"Is there any chance you are pregnant? \" \"When was your last menstrual period? \"      N/A    9. TRAVEL: \"Have you traveled out of the country in the last month? \" (e.g., travel history, exposures)      No recent travel    Protocols used: FEVER-ADULT-[place]    Pt reports fever of 100.7, chills, for 2 days and fatigue and a rash on arms and neck. Reviewed for pt to be seen with PCP today. Warm transfer to [address] in [facility]) in [place]. Caller provided care advice and instructed to call back with worsening symptoms. Attention Provider: Thank you for allowing me to participate in the care of your patient. The patient was connected to triage in response to information provided to the [facility]. Please do not respond through this encounter as the response is not directed to a shared pool.

## 2020-10-23 RX ORDER — DIAZEPAM 5 MG/1
5 TABLET ORAL EVERY 6 HOURS PRN
COMMUNITY
Start: 2020-09-01 | End: 2021-09-23 | Stop reason: ALTCHOICE

## 2020-10-23 NOTE — TELEPHONE ENCOUNTER
Pt is requested rx below:    diazePAM (VALIUM) 5 MG tablet [[id number]]    JEAN-CLAUDE TARANGO [address] [place] [address]

## 2020-11-02 RX ORDER — SPIRONOLACTONE 25 MG/1
TABLET ORAL
Qty: 45 TABLET | Refills: 0 | OUTPATIENT
Start: 2020-11-02

## 2020-11-09 RX ORDER — INSULIN ASPART 100 [IU]/ML
INJECTION, SOLUTION INTRAVENOUS; SUBCUTANEOUS
Refills: 0 | OUTPATIENT
Start: 2020-11-09

## 2020-11-17 RX ORDER — SPIRONOLACTONE 25 MG/1
TABLET ORAL
Qty: 45 TABLET | Refills: 0 | OUTPATIENT
Start: 2020-11-17

## 2020-12-03 RX ORDER — SPIRONOLACTONE 25 MG/1
TABLET ORAL
Qty: 45 TABLET | Refills: 0 | OUTPATIENT
Start: 2020-12-03

## 2020-12-19 RX ORDER — SIMVASTATIN 20 MG
TABLET ORAL
Qty: 90 TABLET | Refills: 0 | OUTPATIENT
Start: 2020-12-19

## 2020-12-27 RX ORDER — LISINOPRIL 5 MG/1
TABLET ORAL
Qty: 90 TABLET | Refills: 0 | OUTPATIENT
Start: 2020-12-27

## 2021-01-10 RX ORDER — DICLOFENAC SODIUM 75 MG/1
TABLET, DELAYED RELEASE ORAL
Qty: 60 TABLET | Refills: 2 | OUTPATIENT
Start: 2021-01-10

## 2021-02-06 RX ORDER — DICLOFENAC SODIUM 75 MG/1
TABLET, DELAYED RELEASE ORAL
Qty: 60 TABLET | Refills: 2 | OUTPATIENT
Start: 2021-02-06

## 2021-02-18 RX ORDER — DICLOFENAC SODIUM 75 MG/1
TABLET, DELAYED RELEASE ORAL
Qty: 60 TABLET | Refills: 2 | OUTPATIENT
Start: 2021-02-18

## 2021-02-18 RX ORDER — LISINOPRIL 5 MG/1
TABLET ORAL
Qty: 90 TABLET | Refills: 0 | OUTPATIENT
Start: 2021-02-18

## 2021-02-18 RX ORDER — SIMVASTATIN 20 MG
TABLET ORAL
Qty: 90 TABLET | Refills: 0 | OUTPATIENT
Start: 2021-02-18

## 2021-03-03 ENCOUNTER — CARE COORDINATION (OUTPATIENT)
Dept: CARE COORDINATION | Age: 66
End: 2021-03-03

## 2021-03-03 NOTE — CARE COORDINATION
Placed phone call to patient for the purpose of offering CM and she was not available. Her phone rang with busy symbol. Plan  Make second phone call    Robert Lanier.  Magdaleno Osler, RN, BSN, [address] [place] [address] Primary Care  [phone]

## 2021-03-04 RX ORDER — SPIRONOLACTONE 25 MG/1
TABLET ORAL
Qty: 90 TABLET | Refills: 0 | OUTPATIENT
Start: 2021-03-04

## 2021-04-26 ENCOUNTER — TELEPHONE (OUTPATIENT)
Dept: PRIMARY CARE CLINIC | Age: 66
End: 2021-04-26

## 2021-04-26 NOTE — TELEPHONE ENCOUNTER
----- Message from Lisa Jean-Pierre sent at 2021  1:36 PM EDT -----  Subject: Appointment Request    Reason for Call: New Patient Request Appointment    QUESTIONS  Type of Appointment? New Patient/New to Provider  Reason for appointment request? No appointments available during search  Additional Information for Provider? Pt looking for new provider. None   available. Was recommended by Dr. Dusty Richardson  ---------------------------------------------------------------------------  --------------  Mesha ABEL  What is the best way for the office to contact you? Do not leave any   message, patient will call back for answer  Preferred Call Back Phone Number? [phone]  ---------------------------------------------------------------------------  --------------  SCRIPT ANSWERS  Relationship to Patient? Self  Appointment reason? Establish Care/Find a provider  Is this the first appointment to establish care for a ? No  Have you been diagnosed with, tested for, or told that you are suspected   of having COVID-19 (Coronavirus)? No  Have you had a fever or taken medication to treat a fever within the past   3 days? No  Have you had a cough, shortness of breath or flu-like symptoms within the   past 3 days? No  Do you currently have flu-like symptoms including fever or chills, cough,   shortness of breath, or difficulty breathing, or new loss of taste or   smell? No  (Service Expert  click yes below to proceed with [facility] As Usual   Scheduling)?  Yes

## 2021-04-30 ENCOUNTER — CARE COORDINATION (OUTPATIENT)
Dept: CARE COORDINATION | Age: 66
End: 2021-04-30

## 2021-04-30 NOTE — CARE COORDINATION
Placed phone call to patient for the purpose of offering CM and she was not available. Phone rang busy and unable to leave message. Plan  No further outreach is necessary    Beba Quinteros.  Chyeenne Patel RN, BSN, [address] [place] [address] Primary Care  [phone]

## 2021-05-03 ENCOUNTER — TELEPHONE (OUTPATIENT)
Dept: ENDOCRINOLOGY | Age: 66
End: 2021-05-03

## 2021-05-03 DIAGNOSIS — E03.8 OTHER SPECIFIED HYPOTHYROIDISM: ICD-10-CM

## 2021-05-03 NOTE — TELEPHONE ENCOUNTER
PTs daughter is requesting a referral for her mother to be seen by Dr. Padmini Steven, Can you please place a referral in the system for DM and Thyroid? I have scheduled the patient for June.    Thank you

## 2021-05-14 RX ORDER — INSULIN ASPART 100 [IU]/ML
INJECTION, SOLUTION INTRAVENOUS; SUBCUTANEOUS
Qty: 15 ML | Refills: 0 | OUTPATIENT
Start: 2021-05-14

## 2021-05-14 RX ORDER — INSULIN DETEMIR 100 [IU]/ML
INJECTION, SOLUTION SUBCUTANEOUS
Qty: 15 ML | Refills: 0 | OUTPATIENT
Start: 2021-05-14

## 2021-06-02 PROBLEM — Z91.14 VIOLATION OF CONTROLLED SUBSTANCE AGREEMENT: Status: ACTIVE | Noted: 2019-02-14

## 2021-06-02 PROBLEM — R41.840 ATTENTION AND CONCENTRATION DEFICIT: Status: ACTIVE | Noted: 2018-07-23

## 2021-06-02 PROBLEM — R07.9 LEFT-SIDED CHEST PAIN: Status: ACTIVE | Noted: 2019-04-07

## 2021-06-02 PROBLEM — L30.9 ECZEMA: Status: ACTIVE | Noted: 2021-06-02

## 2021-06-02 PROBLEM — H40.52X0 NEOVASCULAR GLAUCOMA OF LEFT EYE: Status: ACTIVE | Noted: 2019-07-22

## 2021-06-02 PROBLEM — K63.5 COLONIC POLYP: Status: ACTIVE | Noted: 2017-08-27

## 2021-06-02 PROBLEM — M65.30 TRIGGERING OF DIGIT: Status: ACTIVE | Noted: 2018-01-04

## 2021-06-02 PROBLEM — M50.90 CERVICAL DISC DISEASE: Status: ACTIVE | Noted: 2021-06-02

## 2021-06-02 PROBLEM — G56.03 CARPAL TUNNEL SYNDROME, BILATERAL: Status: ACTIVE | Noted: 2020-01-28

## 2021-06-02 PROBLEM — F33.1 MAJOR DEPRESSIVE DISORDER, RECURRENT, MODERATE (HCC): Status: ACTIVE | Noted: 2017-10-15

## 2021-06-02 PROBLEM — Z91.148 VIOLATION OF CONTROLLED SUBSTANCE AGREEMENT: Status: ACTIVE | Noted: 2019-02-14

## 2021-06-02 PROBLEM — E89.0 HYPOTHYROIDISM, POSTSURGICAL: Status: ACTIVE | Noted: 2017-09-10

## 2021-06-02 PROBLEM — E78.5 HYPERLIPIDEMIA ASSOCIATED WITH TYPE 2 DIABETES MELLITUS (HCC): Status: ACTIVE | Noted: 2018-10-29

## 2021-06-02 PROBLEM — G47.00 PERSISTENT INSOMNIA: Status: ACTIVE | Noted: 2017-10-15

## 2021-06-02 PROBLEM — H34.9: Status: ACTIVE | Noted: 2017-08-27

## 2021-06-02 PROBLEM — S09.90XA INJURY OF HEAD: Status: ACTIVE | Noted: 2021-06-02

## 2021-06-02 PROBLEM — E11.69 HYPERLIPIDEMIA ASSOCIATED WITH TYPE 2 DIABETES MELLITUS (HCC): Status: ACTIVE | Noted: 2018-10-29

## 2021-06-02 PROBLEM — R94.39 ABNORMAL NUCLEAR STRESS TEST: Status: ACTIVE | Noted: 2021-06-02

## 2021-06-02 PROBLEM — F32.A DEPRESSION: Status: ACTIVE | Noted: 2021-06-02

## 2021-06-08 ENCOUNTER — OFFICE VISIT (OUTPATIENT)
Dept: ENDOCRINOLOGY | Age: 66
End: 2021-06-08
Payer: MEDICARE

## 2021-06-08 VITALS
HEART RATE: 118 BPM | OXYGEN SATURATION: 98 % | WEIGHT: 236 LBS | SYSTOLIC BLOOD PRESSURE: 116 MMHG | DIASTOLIC BLOOD PRESSURE: 84 MMHG | BODY MASS INDEX: 35.77 KG/M2 | HEIGHT: 68 IN

## 2021-06-08 DIAGNOSIS — E11.69 DYSLIPIDEMIA ASSOCIATED WITH TYPE 2 DIABETES MELLITUS (HCC): ICD-10-CM

## 2021-06-08 DIAGNOSIS — E11.65 UNCONTROLLED TYPE 2 DIABETES MELLITUS WITH HYPERGLYCEMIA (HCC): Primary | ICD-10-CM

## 2021-06-08 DIAGNOSIS — E66.01 SEVERE OBESITY (BMI 35.0-35.9 WITH COMORBIDITY) (HCC): ICD-10-CM

## 2021-06-08 DIAGNOSIS — E78.5 DYSLIPIDEMIA ASSOCIATED WITH TYPE 2 DIABETES MELLITUS (HCC): ICD-10-CM

## 2021-06-08 DIAGNOSIS — E89.0 POSTOPERATIVE HYPOTHYROIDISM: ICD-10-CM

## 2021-06-08 PROCEDURE — 3017F COLORECTAL CA SCREEN DOC REV: CPT | Performed by: INTERNAL MEDICINE

## 2021-06-08 PROCEDURE — 99204 OFFICE O/P NEW MOD 45 MIN: CPT | Performed by: INTERNAL MEDICINE

## 2021-06-08 PROCEDURE — 2022F DILAT RTA XM EVC RTNOPTHY: CPT | Performed by: INTERNAL MEDICINE

## 2021-06-08 PROCEDURE — G8427 DOCREV CUR MEDS BY ELIG CLIN: HCPCS | Performed by: INTERNAL MEDICINE

## 2021-06-08 PROCEDURE — G8417 CALC BMI ABV UP PARAM F/U: HCPCS | Performed by: INTERNAL MEDICINE

## 2021-06-08 PROCEDURE — 3046F HEMOGLOBIN A1C LEVEL >9.0%: CPT | Performed by: INTERNAL MEDICINE

## 2021-06-08 PROCEDURE — 1036F TOBACCO NON-USER: CPT | Performed by: INTERNAL MEDICINE

## 2021-06-08 RX ORDER — GLUCOSAMINE HCL/CHONDROITIN SU 500-400 MG
CAPSULE ORAL
Qty: 100 STRIP | Refills: 11 | Status: SHIPPED | OUTPATIENT
Start: 2021-06-08 | End: 2021-06-09 | Stop reason: SDUPTHER

## 2021-06-08 RX ORDER — LEVOTHYROXINE SODIUM 175 MCG
175 TABLET ORAL DAILY
Qty: 30 TABLET | Refills: 11 | Status: SHIPPED | OUTPATIENT
Start: 2021-06-08 | End: 2021-06-08 | Stop reason: SDUPTHER

## 2021-06-08 RX ORDER — ARIPIPRAZOLE 5 MG/1
5 TABLET ORAL DAILY
COMMUNITY
End: 2022-03-31

## 2021-06-08 RX ORDER — LANCETS 30 GAUGE
1 EACH MISCELLANEOUS DAILY
Qty: 100 EACH | Refills: 11 | Status: SHIPPED | OUTPATIENT
Start: 2021-06-08 | End: 2021-06-08 | Stop reason: SDUPTHER

## 2021-06-08 RX ORDER — DIPHENHYDRAMINE HYDROCHLORIDE 25 MG/1
CAPSULE, LIQUID FILLED ORAL
Qty: 1 KIT | Refills: 0 | Status: SHIPPED | OUTPATIENT
Start: 2021-06-08 | End: 2021-06-08 | Stop reason: SDUPTHER

## 2021-06-08 RX ORDER — LANCETS 30 GAUGE
1 EACH MISCELLANEOUS DAILY
Qty: 100 EACH | Refills: 11 | Status: SHIPPED | OUTPATIENT
Start: 2021-06-08 | End: 2021-06-09 | Stop reason: SDUPTHER

## 2021-06-08 RX ORDER — GLUCOSAMINE HCL/CHONDROITIN SU 500-400 MG
CAPSULE ORAL
Qty: 100 STRIP | Refills: 11 | Status: SHIPPED | OUTPATIENT
Start: 2021-06-08 | End: 2021-06-08 | Stop reason: SDUPTHER

## 2021-06-08 RX ORDER — FUROSEMIDE 20 MG/1
20 TABLET ORAL PRN
COMMUNITY
End: 2021-09-23

## 2021-06-08 RX ORDER — SERTRALINE HYDROCHLORIDE 100 MG/1
150 TABLET, FILM COATED ORAL
COMMUNITY
Start: 2021-05-20 | End: 2022-03-31 | Stop reason: ALTCHOICE

## 2021-06-08 RX ORDER — LEVOTHYROXINE SODIUM 175 MCG
175 TABLET ORAL DAILY
Qty: 30 TABLET | Refills: 11 | Status: SHIPPED | OUTPATIENT
Start: 2021-06-08 | End: 2021-10-25 | Stop reason: SDUPTHER

## 2021-06-08 RX ORDER — DIPHENHYDRAMINE HYDROCHLORIDE 25 MG/1
CAPSULE, LIQUID FILLED ORAL
Qty: 1 KIT | Refills: 0 | Status: SHIPPED | OUTPATIENT
Start: 2021-06-08 | End: 2021-06-09 | Stop reason: SDUPTHER

## 2021-06-09 DIAGNOSIS — E78.5 DYSLIPIDEMIA ASSOCIATED WITH TYPE 2 DIABETES MELLITUS (HCC): ICD-10-CM

## 2021-06-09 DIAGNOSIS — E11.65 UNCONTROLLED TYPE 2 DIABETES MELLITUS WITH HYPERGLYCEMIA (HCC): ICD-10-CM

## 2021-06-09 DIAGNOSIS — E89.0 POSTOPERATIVE HYPOTHYROIDISM: ICD-10-CM

## 2021-06-09 DIAGNOSIS — E11.69 DYSLIPIDEMIA ASSOCIATED WITH TYPE 2 DIABETES MELLITUS (HCC): ICD-10-CM

## 2021-06-09 RX ORDER — DIPHENHYDRAMINE HYDROCHLORIDE 25 MG/1
CAPSULE, LIQUID FILLED ORAL
Qty: 1 KIT | Refills: 0 | Status: SHIPPED | OUTPATIENT
Start: 2021-06-09 | End: 2021-06-30

## 2021-06-09 RX ORDER — LEVOTHYROXINE SODIUM 175 MCG
175 TABLET ORAL DAILY
Qty: 30 TABLET | Refills: 11 | OUTPATIENT
Start: 2021-06-09

## 2021-06-09 RX ORDER — LANCETS 30 GAUGE
1 EACH MISCELLANEOUS DAILY
Qty: 100 EACH | Refills: 11 | Status: SHIPPED | OUTPATIENT
Start: 2021-06-09

## 2021-06-09 RX ORDER — GLUCOSAMINE HCL/CHONDROITIN SU 500-400 MG
CAPSULE ORAL
Qty: 100 STRIP | Refills: 11 | Status: SHIPPED | OUTPATIENT
Start: 2021-06-09 | End: 2021-09-23

## 2021-06-09 NOTE — TELEPHONE ENCOUNTER
Attempted to call Mrs. Cano back. Line rings then goes busy. She was supposed to complete lab work yesterday and will make decision after results per Dr. Murphy Bradley.

## 2021-06-09 NOTE — TELEPHONE ENCOUNTER
Pts daughter called has had a problem with getting the refills that were requested yesterday. She needs the Synthroid, Glucose monitor, Lancets and Test strips sent to [facility] [address].  Phone [phone], Fax [phone], [address] [facility] [address] asap

## 2021-06-10 NOTE — TELEPHONE ENCOUNTER
Attempted to call Mrs. Cano back. Line rings then goes busy. She was supposed to complete lab work yesterday and will make decision after results per Dr. Padmini Steven.

## 2021-06-24 DIAGNOSIS — E11.65 UNCONTROLLED TYPE 2 DIABETES MELLITUS WITH HYPERGLYCEMIA (HCC): ICD-10-CM

## 2021-06-24 DIAGNOSIS — E78.5 DYSLIPIDEMIA ASSOCIATED WITH TYPE 2 DIABETES MELLITUS (HCC): ICD-10-CM

## 2021-06-24 DIAGNOSIS — E11.69 DYSLIPIDEMIA ASSOCIATED WITH TYPE 2 DIABETES MELLITUS (HCC): ICD-10-CM

## 2021-06-24 DIAGNOSIS — E89.0 POSTOPERATIVE HYPOTHYROIDISM: ICD-10-CM

## 2021-06-24 LAB
A/G RATIO: 1.6 (ref 1.1–2.2)
ALBUMIN SERPL-MCNC: 4.2 G/DL (ref 3.4–5)
ALP BLD-CCNC: 62 U/L (ref 40–129)
ALT SERPL-CCNC: 12 U/L (ref 10–40)
ANION GAP SERPL CALCULATED.3IONS-SCNC: 17 MMOL/L (ref 3–16)
AST SERPL-CCNC: 13 U/L (ref 15–37)
BILIRUB SERPL-MCNC: 0.4 MG/DL (ref 0–1)
BUN BLDV-MCNC: 14 MG/DL (ref 7–20)
CALCIUM SERPL-MCNC: 9.7 MG/DL (ref 8.3–10.6)
CHLORIDE BLD-SCNC: 97 MMOL/L (ref 99–110)
CHOLESTEROL, FASTING: 167 MG/DL (ref 0–199)
CO2: 26 MMOL/L (ref 21–32)
CREAT SERPL-MCNC: 0.7 MG/DL (ref 0.6–1.2)
GFR AFRICAN AMERICAN: >60
GFR NON-AFRICAN AMERICAN: >60
GLOBULIN: 2.6 G/DL
GLUCOSE BLD-MCNC: 349 MG/DL (ref 70–99)
HDLC SERPL-MCNC: 48 MG/DL (ref 40–60)
LDL CHOLESTEROL CALCULATED: 95 MG/DL
POTASSIUM SERPL-SCNC: 4.5 MMOL/L (ref 3.5–5.1)
SODIUM BLD-SCNC: 140 MMOL/L (ref 136–145)
T4 FREE: 1.4 NG/DL (ref 0.9–1.8)
TOTAL PROTEIN: 6.8 G/DL (ref 6.4–8.2)
TRIGLYCERIDE, FASTING: 119 MG/DL (ref 0–150)
TSH SERPL DL<=0.05 MIU/L-ACNC: 37.64 UIU/ML (ref 0.27–4.2)
VLDLC SERPL CALC-MCNC: 24 MG/DL

## 2021-06-25 LAB
DIABETIC RETINOPATHY: POSITIVE
ESTIMATED AVERAGE GLUCOSE: 263.3 MG/DL
HBA1C MFR BLD: 10.8 %

## 2021-06-30 ENCOUNTER — OFFICE VISIT (OUTPATIENT)
Dept: ENDOCRINOLOGY | Age: 66
End: 2021-06-30
Payer: MEDICARE

## 2021-06-30 VITALS
HEIGHT: 68 IN | SYSTOLIC BLOOD PRESSURE: 135 MMHG | DIASTOLIC BLOOD PRESSURE: 88 MMHG | OXYGEN SATURATION: 100 % | BODY MASS INDEX: 36.92 KG/M2 | HEART RATE: 103 BPM | WEIGHT: 243.6 LBS

## 2021-06-30 DIAGNOSIS — E11.65 UNCONTROLLED TYPE 2 DIABETES MELLITUS WITH HYPERGLYCEMIA (HCC): Primary | ICD-10-CM

## 2021-06-30 DIAGNOSIS — E78.5 DYSLIPIDEMIA ASSOCIATED WITH TYPE 2 DIABETES MELLITUS (HCC): ICD-10-CM

## 2021-06-30 DIAGNOSIS — E11.69 DYSLIPIDEMIA ASSOCIATED WITH TYPE 2 DIABETES MELLITUS (HCC): ICD-10-CM

## 2021-06-30 DIAGNOSIS — E89.0 POSTOPERATIVE HYPOTHYROIDISM: ICD-10-CM

## 2021-06-30 PROCEDURE — 3046F HEMOGLOBIN A1C LEVEL >9.0%: CPT | Performed by: INTERNAL MEDICINE

## 2021-06-30 PROCEDURE — 99214 OFFICE O/P EST MOD 30 MIN: CPT | Performed by: INTERNAL MEDICINE

## 2021-06-30 PROCEDURE — 1036F TOBACCO NON-USER: CPT | Performed by: INTERNAL MEDICINE

## 2021-06-30 PROCEDURE — G8417 CALC BMI ABV UP PARAM F/U: HCPCS | Performed by: INTERNAL MEDICINE

## 2021-06-30 PROCEDURE — 3017F COLORECTAL CA SCREEN DOC REV: CPT | Performed by: INTERNAL MEDICINE

## 2021-06-30 PROCEDURE — 2022F DILAT RTA XM EVC RTNOPTHY: CPT | Performed by: INTERNAL MEDICINE

## 2021-06-30 PROCEDURE — G8427 DOCREV CUR MEDS BY ELIG CLIN: HCPCS | Performed by: INTERNAL MEDICINE

## 2021-06-30 RX ORDER — BLOOD SUGAR DIAGNOSTIC
3 STRIP MISCELLANEOUS DAILY
COMMUNITY
End: 2022-05-02

## 2021-06-30 RX ORDER — BLOOD-GLUCOSE METER
KIT MISCELLANEOUS
COMMUNITY

## 2021-06-30 NOTE — PROGRESS NOTES
Patient ID:   Trinh Maravilla is a 63 y.o. female    Chief Complaint:   Trinh Maravilla presents for an evaluation of Type 2 Diabetes Mellitus , Hyperlipidemia and hypertension. 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 It started as gestational diabetes. On insulin since diabetes. Metformin ER 500mg, two tabs twice a day   Levemir 30 units. Last dose three days ago. She took 5 doses in last 3 weeks. She said she had sugar of 37, a week ago in the middle of the night. Checks blood sugars 0-1 times per day. Reviewed/Reported  AM:   Lunch:  Supper: 269, 363  HS:     Hypoglycemias: None     Meals: four, supper is bigger. Snacks, 3 per day (oranges, apples, yogurt). Diet sodas. Exercise: [address] work and yard work . Uses elliptical, 30 minutes every day     Denies chest pain, exertional dyspnea. Family history of CAD: Mother had strokes, father had strokes   Denies smoking. Currently on ASA 81 mg daily     Post surgical hypothyroidism   Surgery for MNG in 2008   Path benign . Report [facility]     Taking levothyroxine 175 mcg daily in am and waits for 60 minutes before breakfast or other meds. She is missing two doses a week. She has psychiatric illness . 2020 was tough for her.      The following portions of the patient's history were reviewed and updated as appropriate:       Family History   Problem Relation Age of Onset    Diabetes Mother     Cancer Mother     Obesity Mother     Diabetes Father    Renell Folds Father     Obesity Father     Other Father         malignant hyperthermia    Heart Disease Brother     Diabetes Brother     Heart Surgery Brother     Obesity Brother     Heart Disease Brother     Heart Surgery Brother     Obesity Brother     Diabetes Sister     Other Other         daughter  with malignant hyperthermia         Social History     Socioeconomic History    Marital status:      Spouse name: Not on file    Number of children: Not on file    Years of education: Not on file    Highest education level: Not on file   Occupational History    Not on file   Tobacco Use    Smoking status: Never Smoker    Smokeless tobacco: Never Used   Substance and Sexual Activity    Alcohol use: Not Currently    Drug use: No    Sexual activity: Not on file   Other Topics Concern    Not on file   Social History Narrative    Not on file     Social Determinants of Health     Financial Resource Strain:     Difficulty of Paying Living Expenses:    Food Insecurity:     Worried About Running Out of Food in the Last Year:     [address] in the Last Year:    Transportation Needs:     Lack of Transportation (Medical):      Lack of Transportation (Non-Medical):    Physical Activity:     Days of Exercise per Week:     Minutes of Exercise per Session:    Stress:     Feeling of Stress :    Social Connections:     Frequency of Communication with Friends and Family:     Frequency of Social Gatherings with Friends and Family:     Attends [employer] Services:     Active Member of Clubs or Organizations:     Attends Club or Organization Meetings:     Marital Status:    Intimate Partner Violence:     Fear of Current or Ex-Partner:     Emotionally Abused:     Physically Abused:     Sexually Abused:        Past Medical History:   Diagnosis Date    ADHD (attention deficit hyperactivity disorder)     Anesthesia     slow to wake when she was on diazepam    Chronic back pain     Deafness in right ear 1997    Depression     GERD (gastroesophageal reflux disease)     Hyperlipidemia     Hypertension     resolved    Hypothyroidism     Meniere disorder     Morbid obesity ([address]     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    Panic disorder without agoraphobia     Rash     follow be dermatologist, skin intact    Seizure ([facility] [address]     Toxemia    Sleep apnea     cpap    Type 2 diabetes, uncontrolled, 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 (HCC)     left eye    Vertigo        Past Surgical History:   Procedure Laterality Date    ABDOMEN SURGERY  11    LAPAROSCOPIC ADJUSTABLE GASTRIC BANDING    CARDIAC CATHETERIZATION       SECTION      GLAUCOMA SURGERY Left 2019    Ahmed valve    LAP BAND  2010    THYROIDECTOMY      UPPER GASTROINTESTINAL ENDOSCOPY  13         Allergies   Allergen Reactions    Levocetirizine Dihydrochloride Anaphylaxis    Sulfa Antibiotics Hives    Divalproex Sodium Er Diarrhea    Dulaglutide Other (See Comments)     Pancreatitis    Exenatide Other (See Comments)     Pancreatitis    Halphabarol Diarrhea         Current Outpatient Medications:     Blood Glucose Monitoring Suppl (ONETOUCH VERIO REFLECT) w/Device KIT, by Does not apply route, Disp: , Rfl:     blood glucose test strips (ONETOUCH VERIO) strip, 3 each by In Vitro route daily As needed. , Disp: , Rfl:     insulin detemir (LEVEMIR FLEXTOUCH) 100 UNIT/ML injection pen, Inject 10 Units into the skin every morning (before breakfast) (Patient taking differently: Inject 30 Units into the skin nightly ), Disp: 15 mL, Rfl: 0    metFORMIN (GLUCOPHAGE) 500 MG tablet, TAKE ONE TABLET BY MOUTH FOUR TIMES DAILY WITH MEALS AND nightly, Disp: 360 tablet, Rfl: 0    Lancets MISC, 1 each by Does not apply route daily Check blood sugar 2-3 times per day, Disp: 100 each, Rfl: 11    blood glucose monitor strips, Check sugar 3 times per day, Disp: 100 strip, Rfl: 11    furosemide (LASIX) 20 MG tablet, Take 20 mg by mouth as needed , Disp: , Rfl:     sertraline (ZOLOFT) 100 MG tablet, TAKE ONE TABLET BY MOUTH TWICE DAILY, Disp: , Rfl:     ARIPiprazole (ABILIFY) 5 MG tablet, Take 5 mg by mouth daily, Disp: , Rfl:     NONFORMULARY, BELLSOMNA AT BEDTIME, Disp: , Rfl:     SYNTHROID 175 MCG tablet, Take 1 tablet by mouth Daily, Disp: 30 tablet, Rfl: 11    diazePAM (VALIUM) 5 MG tablet, Take 5 mg by mouth every 6 hours as needed. , Disp: , Rfl:     haloperidol (HALDOL) 5 MG tablet, Take 1 tablet by mouth 2 times daily, Disp: 60 tablet, Rfl: 0    diclofenac (VOLTAREN) 75 MG EC tablet, Take 1 tablet by mouth 2 times daily, Disp: 60 tablet, Rfl: 3    cyclobenzaprine (FLEXERIL) 5 MG tablet, Take 1 tablet by mouth 3 times daily as needed for Muscle spasms, Disp: 90 tablet, Rfl: 5    aspirin 81 MG tablet, Take 81 mg by mouth, Disp: , Rfl:     DOXYCYCLINE PO, Take 5 mg by mouth, Disp: , Rfl:     hydrOXYzine (ATARAX) 50 MG tablet, TAKE ONE TABLET BY MOUTH EVERY 8 HOURS AS NEEDED FOR ANXIETY, Disp: 90 tablet, Rfl: 2    ipratropium (ATROVENT) 0.06 % nasal spray, 1 spray by Nasal route 3 times daily for 4 days, Disp: 1 Bottle, Rfl: 0      Review of Systems:    Constitutional: Negative for fever, chills, and unexpected weight change. HENT: Negative for congestion, ear pain, rhinorrhea,  sore throat and trouble swallowing. Eyes: Negative for photophobia, redness, itching. Respiratory: Negative for cough, shortness of breath and sputum. Cardiovascular: Negative for chest pain, palpitations and leg swelling. Gastrointestinal: Negative for nausea, vomiting, abdominal pain, diarrhea, constipation. Endocrine: Negative for cold intolerance, heat intolerance, polydipsia, polyphagia and polyuria. Genitourinary: Negative for dysuria, urgency, frequency, hematuria and flank pain. Musculoskeletal: Negative for myalgias, back pain, arthralgias and neck pain. Skin/Nail: Negative for rash, itching. Normal nails. Neurological: Negative for seizures, weakness, light-headedness, numbness and headaches. Hematological/ Lymph nodes: Negative for adenopathy. Does not bruise/bleed easily. Psychiatric/Behavioral: Negative for suicidal ideas, depression, anxiety, sleep disturbance and decreased concentration.           Objective:   Physical Exam:  /88 (Site: Right Upper Arm, Position: Sitting, Cuff Size: Large Adult)   Pulse 103   Ht 5' 7.5\" (1.715 m)   Wt 243 lb 9.6 oz (110.5 kg)   LMP 08/01/2012   SpO2 100%   BMI 37.59 kg/m²   Constitutional: Patient is oriented to person, place, and time. Patient appears well-developed and well-nourished. HENT:    Head: Normocephalic and atraumatic. Eyes: Conjunctivae and EOM are normal.    Neck: Normal range of motion. Thyroid is absent. Scar present. Cardiovascular: Normal rate, regular rhythm and normal heart sounds. Pulmonary/Chest: Effort normal and breath sounds normal.   Abdominal: Soft. Bowel sounds are normal.   Musculoskeletal: Normal range of motion. Neurological: Patient is alert and oriented to person, place, and time. Patient has slow - normal reflexes. Skin: Skin is warm and dry. Psychiatric: Patient has a normal mood and affect.  Patient behavior is normal.     Lab Review:    Office Visit on 06/30/2021   Component Date Value Ref Range Status    Diabetic Retinopathy 06/25/2021 Positive   Final   Orders Only on 06/24/2021   Component Date Value Ref Range Status    Cholesterol, Fasting 06/24/2021 167  0 - 199 mg/dL Final    Triglyceride, Fasting 06/24/2021 119  0 - 150 mg/dL Final    HDL 06/24/2021 48  40 - 60 mg/dL Final    LDL Calculated 06/24/2021 95  <100 mg/dL Final    VLDL Cholesterol Calculated 06/24/2021 24  Not Established mg/dL Final    Sodium 06/24/2021 140  136 - 145 mmol/L Final    Potassium 06/24/2021 4.5  3.5 - 5.1 mmol/L Final    Chloride 06/24/2021 97* 99 - 110 mmol/L Final    CO2 06/24/2021 26  21 - 32 mmol/L Final    Anion Gap 06/24/2021 17* 3 - 16 Final    Glucose 06/24/2021 349* 70 - 99 mg/dL Final    BUN 06/24/2021 14  7 - 20 mg/dL Final    CREATININE 06/24/2021 0.7  0.6 - 1.2 mg/dL Final    GFR Non- 06/24/2021 >60  >60 Final    GFR  06/24/2021 >60  >60 Final    Calcium 06/24/2021 9.7  8.3 - 10.6 mg/dL Final    Total Protein 06/24/2021 6.8  6.4 - 8.2 g/dL Final    Albumin 06/24/2021 4.2  3.4 - 5.0 g/dL Final    Albumin/Globulin Ratio 06/24/2021 1.6  1.1 - 2.2 Final    Total Bilirubin 06/24/2021 0.4  0.0 - 1.0 mg/dL Final    Alkaline Phosphatase 06/24/2021 62  40 - 129 U/L Final    ALT 06/24/2021 12  10 - 40 U/L Final    AST 06/24/2021 13* 15 - 37 U/L Final    Globulin 06/24/2021 2.6  g/dL Final    Hemoglobin A1C 06/24/2021 10.8  See comment % Final    eAG 06/24/2021 263.3  mg/dL Final    T4 Free 06/24/2021 1.4  0.9 - 1.8 ng/dL Final    TSH 06/24/2021 37.64* 0.27 - 4.20 uIU/mL Final   Orders Only on 07/01/2020   Component Date Value Ref Range Status    Sodium 07/01/2020 136  136 - 145 mmol/L Final    Potassium 07/01/2020 4.4  3.5 - 5.1 mmol/L Final    Chloride 07/01/2020 98* 99 - 110 mmol/L Final    CO2 07/01/2020 26  21 - 32 mmol/L Final    Anion Gap 07/01/2020 12  3 - 16 Final    Glucose 07/01/2020 331* 70 - 99 mg/dL Final    BUN 07/01/2020 12  7 - 20 mg/dL Final    CREATININE 07/01/2020 0.6  0.6 - 1.2 mg/dL Final    GFR Non- 07/01/2020 >60  >60 Final    GFR  07/01/2020 >60  >60 Final    Calcium 07/01/2020 9.0  8.3 - 10.6 mg/dL Final    TSH 07/01/2020 3.63  0.27 - 4.20 uIU/mL Final    Hemoglobin A1C 07/01/2020 10.2  See comment % Final    eAG 07/01/2020 246.0  mg/dL Final           Assessment and Plan     Lex Evans was seen today for follow-up.     Diagnoses and all orders for this visit:    Uncontrolled type 2 diabetes mellitus with hyperglycemia ([facility] [address]    Postoperative hypothyroidism    Dyslipidemia associated with type 2 diabetes mellitus ([facility] [address]          1: Type 2 DM complicated with hyperglycemia   Uncontrolled A1C 10.8% June 24th 2021   A1C of <8 would be acceptable because of microvascular and macrovascular complications      Continue Metformin Er 500mg, two tabs twice a day     Change Levemir to 15 units daily in am   Adherence addressed     Check sugar 2-3 times per day   No humalog for now      All instructions provided in written. Check Blood sugars 2-3 times per day. Log them along with insulin and send them every 2 weeks. Call for blood sugars less than 60 or more than 400. Eye exam: Last exam in 2019. Eye exam next Wednesday. Foot exam:  June 2021   Deformity/amputation: absent  Skin lesions/pre-ulcerative calluses: absent  Edema: right- negative, left- negative  Sensory exam: Monofilament sensation: normal  Pulses: normal, Vibration (128 Hz): Intact    Renal screen: check     Set up with CDE      2: HTN   Controlled with out medication     3: Hyperlipidemia   LDL: 95, HDL: 48, TGs: 119 - June 2021    Will prescribe low dose crestor after controlling DM and hypothyroidism     4: Postsurgical hypothyroidism     TSH: 37.6, FT4 1.4 - June 2021     Continue Levothyroxine 175 mcg daily in am   Work on adherence     RTC in 6 weeks with logs     EDUCATION:   Greater than 50% of this visit was spent in general counseling regarding obesity, diet, exercise, importance of adherence to insulin regime, recognition and treatment of hypo and hyperglycemia,  glucose logging, proper diabetes management, diabetic complications with poor management and the importance of glycemic control in order to avoid the complications of diabetes. Risks and potential complications of diabetes were reviewed with the patient. Diabetes health maintenance plan and follow-up were discussed and understood by the patient. We reviewed the importance of medication compliance and regular follow-up. Aggressive lifestyle modification was encouraged. Exercise Counselling: This patient is a candidate for regular physical exercise.  Instructions to perform the following types of exercise:  Swimming or water aerobic exercise  Brisk walking  Playing tennis  Stationary bicycle or elliptical indoor  Low impact aerobic exercise    Instructions given to exercise for the following duration:  30 minutes a day for five-seven days per week.    Following instructions for being active throughout the day in addition to formal exercise:  Walk instead of drive whenever possible  Take the stairs instead of the elevator  Work in the garden  Park to the far end of the parking lot to add more walking steps to destination      Electronically signed by Musa Dela Cruz MD on 6/30/2021 at 1:02 PM

## 2021-07-02 DIAGNOSIS — M62.830 BACK SPASM: ICD-10-CM

## 2021-07-02 RX ORDER — CYCLOBENZAPRINE HCL 5 MG
TABLET ORAL
Qty: 90 TABLET | Refills: 5 | OUTPATIENT
Start: 2021-07-02

## 2021-08-04 ENCOUNTER — TELEPHONE (OUTPATIENT)
Dept: ENDOCRINOLOGY | Age: 66
End: 2021-08-04

## 2021-08-04 DIAGNOSIS — E11.65 UNCONTROLLED TYPE 2 DIABETES MELLITUS WITH HYPERGLYCEMIA (HCC): Primary | ICD-10-CM

## 2021-08-04 RX ORDER — PEN NEEDLE, DIABETIC 32GX 5/32"
NEEDLE, DISPOSABLE MISCELLANEOUS
Qty: 30 EACH | Refills: 0 | OUTPATIENT
Start: 2021-08-04

## 2021-08-23 ENCOUNTER — TELEPHONE (OUTPATIENT)
Dept: PHARMACY | Facility: CLINIC | Age: 66
End: 2021-08-23

## 2021-08-23 NOTE — TELEPHONE ENCOUNTER
Dr. Mohamud Vo MD,     Patient has appointment with you tomorrow 08/25/2021; has diabetes, age >40 years, LDL >70. Would patient benefit from moderate-intensity statin therapy? Patient previously on simvastatin, unclear why stopped. Could consider rosuvastatin 5mg daily. Last visit: 06/30/2021     See encounter note(s) below for complete details. Please let me know if you have any questions. Thank you,  Xochitl Casas, PharmD, BCACP, [address] [place] [address], toll free: [phone], option 1     =======================================================    [facility]: STATIN THERAPY REVIEW  Identified statin use in persons with diabetes and/or cardiovascular disease care gap per [facility]; Records dated: 08/09/2021    Last Office Visit: 06/30/2021 w/ Endo    Patient identified as LIS = 1, therefore patient may be able to receive a 90-day supply for the same cost as a 30-day supply    Patient also appears to be taking: insulin, metformin    Allergies   Allergen Reactions    Levocetirizine Dihydrochloride Anaphylaxis    Sulfa Antibiotics Hives    Divalproex Sodium Er Diarrhea    Dulaglutide Other (See Comments)     Pancreatitis    Exenatide Other (See Comments)     Pancreatitis    Halphabarol Diarrhea     ASSESSMENT  Estimated Creatinine Clearance: 105 mL/min (based on SCr of 0.7 mg/dL).      Lab Results   Component Value Date    LABGLOM >60 06/24/2021     DIABETES ADHERENCE    Per Reconciled Dispense Report:  Metformin 500mg x2 tabs BID last filled on 06/14/2021 for 90 day supply     Lab Results   Component Value Date    LABA1C 10.8 06/24/2021    LABA1C 10.8 07/15/2020    LABA1C 10.2 07/01/2020     NOTE A1c >9%    STATIN GAP IDENTIFIED    Per Reconciled Dispense Report:  Simvastatin last filled in 2020    Lab Results   Component Value Date    CHOL 169 07/15/2020    TRIG 151 (H) 07/15/2020    HDL 48 06/24/2021 [address] 06/24/2021     ALT   Date Value Ref Range Status   06/24/2021 12 10 - 40 U/L Final     AST   Date Value Ref Range Status   06/24/2021 13 (L) 15 - 37 U/L Final     The 10-year ASCVD risk score (Britney Bianchi, et al., 2013) is: 13.4%    Values used to calculate the score:      Age: 61 years      Sex: Female      Is Non- : No      Diabetic: Yes      Tobacco smoker: No      Systolic Blood Pressure: 090 mmHg      Is BP treated: Yes      HDL Cholesterol: 48 mg/dL      Total Cholesterol: 167 mg/dL     2021 ADA Guidelines Age:    >/= 36years old:   o No history of ASCVD or 10-year ASCVD risk < 20% - Moderate-intensity statin is recommended. PLAN  The following are interventions that have been identified:   - Patient identified as having A1c > 9% in the calendar year and SUPD gap    Patient previously on simvastatin per chart revivew, unclear why stopped. Would recommend rosuvastatin 5mg daily. Will route to endocrinologist.     No future appointments.     Mary Grace Camacho, PharmD, BCACP, [address] [facility], toll free: [phone], option 1

## 2021-08-24 PROBLEM — E88.81 METABOLIC SYNDROME: Status: ACTIVE | Noted: 2021-07-14

## 2021-08-24 PROBLEM — E88.810 METABOLIC SYNDROME: Status: ACTIVE | Noted: 2021-07-14

## 2021-08-27 NOTE — TELEPHONE ENCOUNTER
[facility] REVIEW: STATIN THERAPY REVIEW    No response from provider.      Xochitl Casas, PharmD, [address], [address]   Department, toll free: [phone], option 1    =======================================================    For Pharmacy Admin Tracking Only   Recommendation Provided To: Provider: 1 via Note to Provider   Intervention Detail: New Rx: 1, reason: Needs Additional Therapy   Gap Closed?: No    Intervention Accepted By: Provider: 0   Time Spent (min): 20

## 2021-09-12 DIAGNOSIS — E11.65 UNCONTROLLED TYPE 2 DIABETES MELLITUS WITH HYPERGLYCEMIA (HCC): ICD-10-CM

## 2021-09-13 RX ORDER — INSULIN DETEMIR 100 [IU]/ML
INJECTION, SOLUTION SUBCUTANEOUS
Qty: 15 ML | Refills: 0 | OUTPATIENT
Start: 2021-09-13

## 2021-09-14 DIAGNOSIS — E11.65 UNCONTROLLED TYPE 2 DIABETES MELLITUS WITH HYPERGLYCEMIA (HCC): ICD-10-CM

## 2021-09-14 RX ORDER — INSULIN DETEMIR 100 [IU]/ML
INJECTION, SOLUTION SUBCUTANEOUS
Qty: 15 ML | Refills: 0 | OUTPATIENT
Start: 2021-09-14

## 2021-09-23 ENCOUNTER — OFFICE VISIT (OUTPATIENT)
Dept: ENDOCRINOLOGY | Age: 66
End: 2021-09-23
Payer: MEDICARE

## 2021-09-23 VITALS
DIASTOLIC BLOOD PRESSURE: 70 MMHG | HEART RATE: 96 BPM | WEIGHT: 231.8 LBS | SYSTOLIC BLOOD PRESSURE: 118 MMHG | BODY MASS INDEX: 35.13 KG/M2 | HEIGHT: 68 IN | OXYGEN SATURATION: 97 %

## 2021-09-23 DIAGNOSIS — E11.65 UNCONTROLLED TYPE 2 DIABETES MELLITUS WITH HYPERGLYCEMIA (HCC): Primary | ICD-10-CM

## 2021-09-23 DIAGNOSIS — E11.69 DYSLIPIDEMIA ASSOCIATED WITH TYPE 2 DIABETES MELLITUS (HCC): ICD-10-CM

## 2021-09-23 DIAGNOSIS — E89.0 POSTOPERATIVE HYPOTHYROIDISM: ICD-10-CM

## 2021-09-23 DIAGNOSIS — E78.5 DYSLIPIDEMIA ASSOCIATED WITH TYPE 2 DIABETES MELLITUS (HCC): ICD-10-CM

## 2021-09-23 PROCEDURE — 99214 OFFICE O/P EST MOD 30 MIN: CPT | Performed by: INTERNAL MEDICINE

## 2021-09-23 RX ORDER — LEVOTHYROXINE SODIUM 175 MCG
175 TABLET ORAL DAILY
Qty: 30 TABLET | Refills: 11 | Status: CANCELLED | OUTPATIENT
Start: 2021-09-23

## 2021-09-23 RX ORDER — INSULIN DETEMIR 100 [IU]/ML
15 INJECTION, SOLUTION SUBCUTANEOUS
Qty: 15 ML | Refills: 0 | Status: SHIPPED | OUTPATIENT
Start: 2021-09-23 | End: 2021-10-25

## 2021-09-23 NOTE — PROGRESS NOTES
Patient ID:   Heather Ballesteros is a 61 y.o. female    Chief Complaint:   Heather Ballesteros presents for an evaluation of Type 2 Diabetes Mellitus , Hyperlipidemia and hypertension. 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 It started as gestational diabetes. On insulin since diabetes. LOV: June 2021   Aug 2021: No show  Aug 2021: No show     Metformin ER 500mg, two tabs twice a day   Levemir 30 units. Last dose was a week ago. She was suppose to take 15 units daily. Checks blood sugars 0-1 times per day. Reportedly around 200's   AM:   Lunch:  Supper:    HS:     Hypoglycemias: None     Meals: four, supper is bigger. Snacks, 3 per day (oranges, apples, yogurt). Diet sodas. Exercise: [address] and yard work . Uses elliptical, 30 minutes every day     Denies chest pain, exertional dyspnea. Family history of CAD: Mother had strokes, father had strokes   Denies smoking. Currently on ASA 81 mg daily     Post surgical hypothyroidism   Surgery for MNG in 2008   Path benign . Report reviewed from [facility]     Taking levothyroxine 175 mcg daily in am and waits for 60 minutes before breakfast or other meds. She was previously missing it. Denies missing doses now. The following portions of the patient's history were reviewed and updated as appropriate:       Family History   Problem Relation Age of Onset    Diabetes Mother     Cancer Mother     Obesity Mother     Diabetes Father     Cancer Father     Obesity Father     Other Father         malignant hyperthermia    Heart Disease Brother     Diabetes Brother     Heart Surgery Brother     Obesity Brother     Heart Disease Brother     Heart Surgery Brother     Obesity Brother     Diabetes Sister     Other Other         daughter  with malignant hyperthermia         Social History     Socioeconomic History    Marital status:       Spouse name: Not on file    Number of children: Not on file    Years of education: Not on file    Highest education level: Not on file   Occupational History    Not on file   Tobacco Use    Smoking status: Never Smoker    Smokeless tobacco: Never Used   Vaping Use    Vaping Use: Never used   Substance and Sexual Activity    Alcohol use: Not Currently    Drug use: No    Sexual activity: Not on file   Other Topics Concern    Not on file   Social History Narrative    Not on file     Social Determinants of Health     Financial Resource Strain:     Difficulty of Paying Living Expenses:    Food Insecurity:     Worried About Running Out of Food in the Last Year:     [address] in the Last Year:    Transportation Needs:     Lack of Transportation (Medical):      Lack of Transportation (Non-Medical):    Physical Activity:     Days of Exercise per Week:     Minutes of Exercise per Session:    Stress:     Feeling of Stress :    Social Connections:     Frequency of Communication with Friends and Family:     Frequency of Social Gatherings with Friends and Family:     Attends Faith Services:     Active Member of Clubs or Organizations:     Attends Club or Organization Meetings:     Marital Status:    Intimate Partner Violence:     Fear of Current or Ex-Partner:     Emotionally Abused:     Physically Abused:     Sexually Abused:        Past Medical History:   Diagnosis Date    ADHD (attention deficit hyperactivity disorder)     Anesthesia     slow to wake when she was on diazepam    Chronic back pain     Deafness in right ear 1997    Depression     GERD (gastroesophageal reflux disease)     Hyperlipidemia     Hypertension     resolved    Hypothyroidism     Meniere disorder     Morbid obesity ([address]     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    OAB (overactive bladder)     Panic disorder without agoraphobia     Rash     follow be dermatologist, skin intact    Seizure ([address]     Toxemia    Sleep apnea     cpap    Type 2 diabetes, uncontrolled, 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 (HCC)     left eye    Vertigo        Past Surgical History:   Procedure Laterality Date    ABDOMEN SURGERY  11    LAPAROSCOPIC ADJUSTABLE GASTRIC BANDING    CARDIAC CATHETERIZATION       SECTION      GLAUCOMA SURGERY Left 2019    Ahmed valve    LAP BAND  2010    THYROIDECTOMY      UPPER GASTROINTESTINAL ENDOSCOPY  13         Allergies   Allergen Reactions    Levocetirizine Dihydrochloride Anaphylaxis    Sulfa Antibiotics Hives    Divalproex Sodium Er Diarrhea    Dulaglutide Other (See Comments)     Pancreatitis    Exenatide Other (See Comments)     Pancreatitis    Halphabarol Diarrhea         Current Outpatient Medications:     insulin detemir (LEVEMIR FLEXTOUCH) 100 UNIT/ML injection pen, Inject 15 Units into the skin every morning (before breakfast), Disp: 15 mL, Rfl: 0    metFORMIN (GLUCOPHAGE) 500 MG tablet, TAKE ONE TABLET BY MOUTH FOUR TIMES DAILY WITH MEALS AND nightly, Disp: 360 tablet, Rfl: 0    Insulin Pen Needle 32G X 4 MM MISC, 1 each by Does not apply route daily, Disp: 100 each, Rfl: 3    Blood Glucose Monitoring Suppl (ONETOUCH VERIO REFLECT) w/Device KIT, by Does not apply route, Disp: , Rfl:     blood glucose test strips (ONETOUCH VERIO) strip, 3 each by In Vitro route daily As needed. , Disp: , Rfl:     Lancets MISC, 1 each by Does not apply route daily Check blood sugar 2-3 times per day, Disp: 100 each, Rfl: 11    sertraline (ZOLOFT) 100 MG tablet, TAKE ONE TABLET BY MOUTH TWICE DAILY, Disp: , Rfl:     ARIPiprazole (ABILIFY) 5 MG tablet, Take 5 mg by mouth daily, Disp: , Rfl:     DOXYCYCLINE PO, Take 5 mg by mouth, Disp: , Rfl:     SYNTHROID 175 MCG tablet, Take 1 tablet by mouth Daily, Disp: 30 tablet, Rfl: 11    aspirin 81 MG tablet, Take 81 mg by mouth, Disp: , Rfl:     ipratropium (ATROVENT) 0.06 % nasal spray, 1 spray by Nasal route 3 times daily for 4 days, Disp: 1 Bottle, Rfl: 0      Review of Systems:    Constitutional: Negative for fever, chills, and unexpected weight change. HENT: Negative for congestion, ear pain, rhinorrhea,  sore throat and trouble swallowing. Eyes: Negative for photophobia, redness, itching. Respiratory: Negative for cough, shortness of breath and sputum. Cardiovascular: Negative for chest pain, palpitations and leg swelling. Gastrointestinal: Negative for nausea, vomiting, abdominal pain, diarrhea, constipation. Endocrine: Negative for cold intolerance, heat intolerance, polydipsia, polyphagia and polyuria. Genitourinary: Negative for dysuria, urgency, frequency, hematuria and flank pain. Musculoskeletal: Negative for myalgias, back pain, arthralgias and neck pain. Skin/Nail: Negative for rash, itching. Normal nails. Neurological: Negative for seizures, weakness, light-headedness, numbness and headaches. Hematological/ Lymph nodes: Negative for adenopathy. Does not bruise/bleed easily. Psychiatric/Behavioral: Negative for suicidal ideas, depression, anxiety, sleep disturbance and decreased concentration. Objective:   Physical Exam:  /70 (Site: Right Upper Arm, Position: Sitting, Cuff Size: Large Adult)   Pulse 96   Ht 5' 7.5\" (1.715 m)   Wt 231 lb 12.8 oz (105.1 kg)   LMP 08/01/2012   SpO2 97%   BMI 35.77 kg/m²   Constitutional: Patient is oriented to person, place, and time. Patient appears well-developed and well-nourished. HENT:    Head: Normocephalic and atraumatic. Eyes: Conjunctivae and EOM are normal.    Neck: Normal range of motion. Thyroid is absent. Scar present. Cardiovascular: Normal rate, regular rhythm and normal heart sounds. Pulmonary/Chest: Effort normal and breath sounds normal.   Abdominal: Soft. Bowel sounds are normal.   Musculoskeletal: Normal range of motion. Neurological: Patient is alert and oriented to person, place, and time. Patient has slow - normal reflexes. Skin: Skin is warm and dry. Psychiatric: Patient has a normal mood and affect. Patient behavior is normal.     Lab Review:    Office Visit on 06/30/2021   Component Date Value Ref Range Status    Diabetic Retinopathy 06/25/2021 Positive   Final   Orders Only on 06/24/2021   Component Date Value Ref Range Status    Cholesterol, Fasting 06/24/2021 167  0 - 199 mg/dL Final    Triglyceride, Fasting 06/24/2021 119  0 - 150 mg/dL Final    HDL 06/24/2021 48  40 - 60 mg/dL Final    LDL Calculated 06/24/2021 95  <100 mg/dL Final    VLDL Cholesterol Calculated 06/24/2021 24  Not Established mg/dL Final    Sodium 06/24/2021 140  136 - 145 mmol/L Final    Potassium 06/24/2021 4.5  3.5 - 5.1 mmol/L Final    Chloride 06/24/2021 97* 99 - 110 mmol/L Final    CO2 06/24/2021 26  21 - 32 mmol/L Final    Anion Gap 06/24/2021 17* 3 - 16 Final    Glucose 06/24/2021 349* 70 - 99 mg/dL Final    BUN 06/24/2021 14  7 - 20 mg/dL Final    CREATININE 06/24/2021 0.7  0.6 - 1.2 mg/dL Final    GFR Non- 06/24/2021 >60  >60 Final    GFR  06/24/2021 >60  >60 Final    Calcium 06/24/2021 9.7  8.3 - 10.6 mg/dL Final    Total Protein 06/24/2021 6.8  6.4 - 8.2 g/dL Final    Albumin 06/24/2021 4.2  3.4 - 5.0 g/dL Final    Albumin/Globulin Ratio 06/24/2021 1.6  1.1 - 2.2 Final    Total Bilirubin 06/24/2021 0.4  0.0 - 1.0 mg/dL Final    Alkaline Phosphatase 06/24/2021 62  40 - 129 U/L Final    ALT 06/24/2021 12  10 - 40 U/L Final    AST 06/24/2021 13* 15 - 37 U/L Final    Globulin 06/24/2021 2.6  g/dL Final    Hemoglobin A1C 06/24/2021 10.8  See comment % Final    eAG 06/24/2021 263.3  mg/dL Final    T4 Free 06/24/2021 1.4  0.9 - 1.8 ng/dL Final    TSH 06/24/2021 37.64* 0.27 - 4.20 uIU/mL Final           Assessment and Plan     Marco A Beach was seen today for follow-up, diabetes and thyroid problem.     Diagnoses and all orders for this visit:    Uncontrolled type 2 diabetes mellitus with hyperglycemia (HCC)  -     insulin detemir (LEVEMIR FLEXTOUCH) 100 UNIT/ML injection pen; Inject 15 Units into the skin every morning (before breakfast)  -     metFORMIN (GLUCOPHAGE) 500 MG tablet; TAKE ONE TABLET BY MOUTH FOUR TIMES DAILY WITH MEALS AND nightly  -     Hemoglobin A1C; Future    Postoperative hypothyroidism  -     TSH without Reflex; Future  -     T4, Free; Future    Dyslipidemia associated with type 2 diabetes mellitus ([facility]          1: Type 2 DM complicated with hyperglycemia   Uncontrolled A1C 10.8% June 24th 2021   A1C of <8 would be acceptable because of microvascular and macrovascular complications      Continue Metformin Er 500mg, two tabs twice a day     Change Levemir to 15 units daily in am   Adherence addressed     Check sugar 2-3 times per day   No humalog for now   Bring meter on NOV      All instructions provided in written. Check Blood sugars 2-3 times per day. Log them along with insulin and send them every 2 weeks. Call for blood sugars less than 60 or more than 400. Eye exam: Last exam in 2019. Eye exam next Wednesday. Foot exam:  June 2021   Deformity/amputation: absent  Skin lesions/pre-ulcerative calluses: absent  Edema: right- negative, left- negative  Sensory exam: Monofilament sensation: normal  Pulses: normal, Vibration (128 Hz):  Intact    Renal screen: check     Set up with CDE      2: HTN   Controlled with out medication     3: Hyperlipidemia   LDL: 95, HDL: 48, TGs: 119 - June 2021    Will prescribe low dose crestor after controlling DM and hypothyroidism     4: Postsurgical hypothyroidism     TSH: 37.6, FT4 1.4 - June 2021     Continue Levothyroxine 175 mcg daily in am   Work on adherence     A1C and TSH, FT4 today     RTC in 6 weeks with logs     EDUCATION:   Greater than 50% of this visit was spent in general counseling regarding obesity, diet, exercise, importance of adherence to insulin regime, recognition and treatment of hypo and hyperglycemia,  glucose logging, proper diabetes management, diabetic complications with poor management and the importance of glycemic control in order to avoid the complications of diabetes. Risks and potential complications of diabetes were reviewed with the patient. Diabetes health maintenance plan and follow-up were discussed and understood by the patient. We reviewed the importance of medication compliance and regular follow-up. Aggressive lifestyle modification was encouraged. Exercise Counselling: This patient is a candidate for regular physical exercise. Instructions to perform the following types of exercise:  Swimming or water aerobic exercise  Brisk walking  Playing tennis  Stationary bicycle or elliptical indoor  Low impact aerobic exercise    Instructions given to exercise for the following duration:  30 minutes a day for five-seven days per week.     Following instructions for being active throughout the day in addition to formal exercise:  Walk instead of drive whenever possible  Take the stairs instead of the elevator  Work in the garden  Park to the far end of the parking lot to add more walking steps to destination      Electronically signed by Bill Buckley MD on 9/23/2021 at 8:25 AM

## 2021-09-29 ENCOUNTER — TELEPHONE (OUTPATIENT)
Dept: ENDOCRINOLOGY | Age: 66
End: 2021-09-29

## 2021-09-29 NOTE — TELEPHONE ENCOUNTER
PT is requesting an Anti Anxiety med. She stated she is stressed out most of the time due to caring for 2 Daughters who are disabled and is concerned with high A1-C. When she is stressed she eats a lot.

## 2021-09-29 NOTE — TELEPHONE ENCOUNTER
Left a message to contact the office. Dr. Darren Israel does not prescribe anti-anxiety medications. She will need to discuss this issue with her PCP or obtain a family physician. Another option would be ER/ Urgent care.

## 2021-09-30 NOTE — TELEPHONE ENCOUNTER
Left a message to contact the office. Dr. Sharri Ewing does not prescribe anti-anxiety medications. She will need to discuss this issue with her PCP or obtain a family physician. Another option would be ER/ Urgent care.

## 2021-09-30 NOTE — TELEPHONE ENCOUNTER
Left a message to contact the office.   Per Dr. Scarlet Leslie blood pressure was controlled without meds   She does not need lisinopril at this time

## 2021-10-18 RX ORDER — LISINOPRIL 2.5 MG/1
TABLET ORAL
Qty: 90 TABLET | Refills: 1 | OUTPATIENT
Start: 2021-10-18

## 2021-10-18 NOTE — TELEPHONE ENCOUNTER
Requested Prescriptions     Pending Prescriptions Disp Refills    lisinopril (PRINIVIL;ZESTRIL) 2.5 MG tablet [Pharmacy Med Name: lisinopril 2.5 mg tablet] 90 tablet 1     Sig: TAKE ONE TABLET BY MOUTH EVERY DAY       Pt is not taking this medication at this time

## 2021-10-22 RX ORDER — IPRATROPIUM BROMIDE 42 UG/1
SPRAY, METERED NASAL
Qty: 15 ML | Refills: 0 | OUTPATIENT
Start: 2021-10-22

## 2021-10-25 ENCOUNTER — OFFICE VISIT (OUTPATIENT)
Dept: ENDOCRINOLOGY | Age: 66
End: 2021-10-25
Payer: MEDICARE

## 2021-10-25 VITALS
OXYGEN SATURATION: 100 % | BODY MASS INDEX: 35.77 KG/M2 | DIASTOLIC BLOOD PRESSURE: 67 MMHG | SYSTOLIC BLOOD PRESSURE: 115 MMHG | HEART RATE: 111 BPM | HEIGHT: 68 IN

## 2021-10-25 DIAGNOSIS — E11.69 DYSLIPIDEMIA ASSOCIATED WITH TYPE 2 DIABETES MELLITUS (HCC): ICD-10-CM

## 2021-10-25 DIAGNOSIS — E89.0 POSTOPERATIVE HYPOTHYROIDISM: ICD-10-CM

## 2021-10-25 DIAGNOSIS — E78.5 DYSLIPIDEMIA ASSOCIATED WITH TYPE 2 DIABETES MELLITUS (HCC): ICD-10-CM

## 2021-10-25 DIAGNOSIS — E11.65 UNCONTROLLED TYPE 2 DIABETES MELLITUS WITH HYPERGLYCEMIA (HCC): Primary | ICD-10-CM

## 2021-10-25 PROCEDURE — 99214 OFFICE O/P EST MOD 30 MIN: CPT | Performed by: INTERNAL MEDICINE

## 2021-10-25 RX ORDER — LEVOTHYROXINE SODIUM 175 MCG
175 TABLET ORAL DAILY
Qty: 30 TABLET | Refills: 11 | Status: SHIPPED | OUTPATIENT
Start: 2021-10-25

## 2021-10-25 RX ORDER — LISINOPRIL 2.5 MG/1
2.5 TABLET ORAL DAILY
COMMUNITY
Start: 2021-10-08 | End: 2021-10-25 | Stop reason: SDUPTHER

## 2021-10-25 RX ORDER — LISINOPRIL 2.5 MG/1
2.5 TABLET ORAL DAILY
Qty: 30 TABLET | Refills: 3 | Status: SHIPPED | OUTPATIENT
Start: 2021-10-25 | End: 2022-04-11

## 2021-10-25 RX ORDER — LEVOTHYROXINE SODIUM 0.2 MG/1
200 TABLET ORAL
Qty: 30 TABLET | Refills: 0 | Status: CANCELLED | OUTPATIENT
Start: 2021-10-25 | End: 2021-11-24

## 2021-10-25 RX ORDER — QUETIAPINE FUMARATE 25 MG/1
25 TABLET, FILM COATED ORAL NIGHTLY
COMMUNITY
Start: 2021-10-08

## 2021-10-25 RX ORDER — SIMVASTATIN 20 MG
20 TABLET ORAL NIGHTLY
COMMUNITY
Start: 2021-10-08 | End: 2021-12-06

## 2021-10-25 RX ORDER — LEVOTHYROXINE SODIUM 0.2 MG/1
200 TABLET ORAL
COMMUNITY
Start: 2021-10-08 | End: 2021-10-25

## 2021-10-25 RX ORDER — ROSUVASTATIN CALCIUM 10 MG/1
10 TABLET, COATED ORAL DAILY
Qty: 90 TABLET | Refills: 1 | Status: SHIPPED | OUTPATIENT
Start: 2021-10-25 | End: 2021-12-06

## 2021-10-25 RX ORDER — TOLTERODINE TARTRATE 2 MG/1
TABLET, EXTENDED RELEASE ORAL
COMMUNITY
Start: 2021-10-09 | End: 2021-12-06

## 2021-10-25 NOTE — PROGRESS NOTES
Patient ID:   Margret Valdez is a 61 y.o. female    Chief Complaint:   Margret Valdez presents for an evaluation of Type 2 Diabetes Mellitus , Hyperlipidemia and hypertension. 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 It started as gestational diabetes. On insulin since diabetes. Metformin ER 500mg, two tabs twice a day   Levemir 15 units daily at night      Checks blood sugars 0-1 times per week. Reportedly around 200-240's in am    AM:   Lunch:  Supper:    HS:     Hypoglycemias: None     Meals: four, supper is bigger. Snacks, 3 per day (oranges, apples, yogurt). Diet sodas. Exercise: [address] work and yard work . Uses elliptical, 30 minutes every day     Denies chest pain, exertional dyspnea. Family history of CAD: Mother had strokes, father had strokes   Denies smoking. Currently on ASA 81 mg daily     Post surgical hypothyroidism   Surgery for MNG in 2008   Path benign . Report reviewed from [facility]     Taking levothyroxine 175 mcg daily in am and waits for 60 minutes before breakfast or other meds. She was previously missing it. Denies missing doses now. The following portions of the patient's history were reviewed and updated as appropriate:       Family History   Problem Relation Age of Onset    Diabetes Mother     Cancer Mother     Obesity Mother     Diabetes Father     Cancer Father     Obesity Father     Other Father         malignant hyperthermia    Heart Disease Brother     Diabetes Brother     Heart Surgery Brother     Obesity Brother     Heart Disease Brother     Heart Surgery Brother     Obesity Brother     Diabetes Sister     Other Other         daughter  with malignant hyperthermia         Social History     Socioeconomic History    Marital status:       Spouse name: Not on file    Number of children: Not on file    Years of education: Not on file    Highest education level: Not on file   Occupational History    Not on file   Tobacco Use    Smoking status: Never Smoker    Smokeless tobacco: Never Used   Vaping Use    Vaping Use: Never used   Substance and Sexual Activity    Alcohol use: Not Currently    Drug use: No    Sexual activity: Not on file   Other Topics Concern    Not on file   Social History Narrative    Not on file     Social Determinants of Health     Financial Resource Strain:     Difficulty of Paying Living Expenses:    Food Insecurity:     Worried About Running Out of Food in the Last Year:     [address] in the Last Year:    Transportation Needs:     Lack of Transportation (Medical):      Lack of Transportation (Non-Medical):    Physical Activity:     Days of Exercise per Week:     Minutes of Exercise per Session:    Stress:     Feeling of Stress :    Social Connections:     Frequency of Communication with Friends and Family:     Frequency of Social Gatherings with Friends and Family:     Attends Christianity Services:     Active Member of Clubs or Organizations:     Attends Club or Organization Meetings:     Marital Status:    Intimate Partner Violence:     Fear of Current or Ex-Partner:     Emotionally Abused:     Physically Abused:     Sexually Abused:        Past Medical History:   Diagnosis Date    ADHD (attention deficit hyperactivity disorder)     Anesthesia     slow to wake when she was on diazepam    Chronic back pain     Deafness in right ear 1997    Depression     GERD (gastroesophageal reflux disease)     Hyperlipidemia     Hypertension     resolved    Hypothyroidism     Meniere disorder     Morbid obesity ([address]     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    OAB (overactive bladder)     Panic disorder without agoraphobia     Rash     follow be dermatologist, skin intact    Seizure ([address]     Toxemia    Sleep apnea     cpap    Type 2 diabetes, uncontrolled, 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 ([address] left eye    Vertigo        Past Surgical History:   Procedure Laterality Date    ABDOMEN SURGERY  11    LAPAROSCOPIC ADJUSTABLE GASTRIC BANDING    CARDIAC CATHETERIZATION       SECTION      GLAUCOMA SURGERY Left 2019    Ahmed valve    LAP BAND  2010    THYROIDECTOMY      UPPER GASTROINTESTINAL ENDOSCOPY  13         Allergies   Allergen Reactions    Levocetirizine Dihydrochloride Anaphylaxis    Sulfa Antibiotics Hives    Divalproex Sodium Er Diarrhea    Dulaglutide Other (See Comments)     Pancreatitis    Exenatide Other (See Comments)     Pancreatitis    Halphabarol Diarrhea         Current Outpatient Medications:     QUEtiapine (SEROQUEL) 25 MG tablet, Take 25 mg by mouth nightly, Disp: , Rfl:     simvastatin (ZOCOR) 20 MG tablet, Take 20 mg by mouth nightly, Disp: , Rfl:     tolterodine (DETROL) 2 MG tablet, TAKE ONE-HALF Tablet BY MOUTH TWICE DAILY, Disp: , Rfl:     lisinopril (PRINIVIL;ZESTRIL) 2.5 MG tablet, Take 1 tablet by mouth daily, Disp: 30 tablet, Rfl: 3    SYNTHROID 175 MCG tablet, Take 1 tablet by mouth Daily, Disp: 30 tablet, Rfl: 11    insulin detemir (LEVEMIR) 100 UNIT/ML injection pen, Inject 18 Units into the skin every morning, Disp: 5 pen, Rfl: 1    rosuvastatin (CRESTOR) 10 MG tablet, Take 1 tablet by mouth daily, Disp: 90 tablet, Rfl: 1    metFORMIN (GLUCOPHAGE) 500 MG tablet, TAKE ONE TABLET BY MOUTH FOUR TIMES DAILY WITH MEALS AND nightly, Disp: 360 tablet, Rfl: 0    Insulin Pen Needle 32G X 4 MM MISC, 1 each by Does not apply route daily, Disp: 100 each, Rfl: 3    Blood Glucose Monitoring Suppl (ONETOUCH VERIO REFLECT) w/Device KIT, by Does not apply route, Disp: , Rfl:     blood glucose test strips (ONETOUCH VERIO) strip, 3 each by In Vitro route daily As needed. , Disp: , Rfl:     Lancets MISC, 1 each by Does not apply route daily Check blood sugar 2-3 times per day, Disp: 100 each, Rfl: 11    sertraline (ZOLOFT) 100 MG tablet, TAKE ONE TABLET BY MOUTH TWICE DAILY, Disp: , Rfl:     ARIPiprazole (ABILIFY) 5 MG tablet, Take 5 mg by mouth daily, Disp: , Rfl:     DOXYCYCLINE PO, Take 5 mg by mouth, Disp: , Rfl:     aspirin 81 MG tablet, Take 81 mg by mouth, Disp: , Rfl:     ipratropium (ATROVENT) 0.06 % nasal spray, 1 spray by Nasal route 3 times daily for 4 days, Disp: 1 Bottle, Rfl: 0      Review of Systems:    Constitutional: Negative for fever, chills, and unexpected weight change. HENT: Negative for congestion, ear pain, rhinorrhea,  sore throat and trouble swallowing. Eyes: Negative for photophobia, redness, itching. Respiratory: Negative for cough, shortness of breath and sputum. Cardiovascular: Negative for chest pain, palpitations and leg swelling. Gastrointestinal: Negative for nausea, vomiting, abdominal pain, diarrhea, constipation. Endocrine: Negative for cold intolerance, heat intolerance, polydipsia, polyphagia and polyuria. Genitourinary: Negative for dysuria, urgency, frequency, hematuria and flank pain. Musculoskeletal: Negative for myalgias, back pain, arthralgias and neck pain. Skin/Nail: Negative for rash, itching. Normal nails. Neurological: Negative for seizures, weakness, light-headedness, numbness and headaches. Hematological/ Lymph nodes: Negative for adenopathy. Does not bruise/bleed easily. Psychiatric/Behavioral: Negative for suicidal ideas, depression, anxiety, sleep disturbance and decreased concentration. Objective:   Physical Exam:  /67 (Site: Right Upper Arm, Position: Sitting, Cuff Size: Large Adult)   Pulse 111   Ht 5' 7.5\" (1.715 m)   LMP 08/01/2012   SpO2 100%   BMI 35.77 kg/m²   Constitutional: Patient is oriented to person, place, and time. Patient appears well-developed and well-nourished. HENT:    Head: Normocephalic and atraumatic. Eyes: Conjunctivae and EOM are normal.    Neck: Normal range of motion. Thyroid is absent. Scar present.    Cardiovascular: Normal rate, regular rhythm and normal heart sounds. Pulmonary/Chest: Effort normal and breath sounds normal.   Abdominal: Soft. Bowel sounds are normal.   Musculoskeletal: Normal range of motion. Neurological: Patient is alert and oriented to person, place, and time. Patient has slow - normal reflexes. Skin: Skin is warm and dry. Psychiatric: Patient has a normal mood and affect.  Patient behavior is normal.     Lab Review:    Orders Only on 09/28/2021   Component Date Value Ref Range Status    Hemoglobin A1C 09/28/2021 12.0  See comment % Final    eAG 09/28/2021 297.7  mg/dL Final    T4 Free 09/28/2021 0.9  0.9 - 1.8 ng/dL Final    TSH 09/28/2021 40.52* 0.27 - 4.20 uIU/mL Final   Office Visit on 06/30/2021   Component Date Value Ref Range Status    Diabetic Retinopathy 06/25/2021 Positive   Final   Orders Only on 06/24/2021   Component Date Value Ref Range Status    Cholesterol, Fasting 06/24/2021 167  0 - 199 mg/dL Final    Triglyceride, Fasting 06/24/2021 119  0 - 150 mg/dL Final    HDL 06/24/2021 48  40 - 60 mg/dL Final    LDL Calculated 06/24/2021 95  <100 mg/dL Final    VLDL Cholesterol Calculated 06/24/2021 24  Not Established mg/dL Final    Sodium 06/24/2021 140  136 - 145 mmol/L Final    Potassium 06/24/2021 4.5  3.5 - 5.1 mmol/L Final    Chloride 06/24/2021 97* 99 - 110 mmol/L Final    CO2 06/24/2021 26  21 - 32 mmol/L Final    Anion Gap 06/24/2021 17* 3 - 16 Final    Glucose 06/24/2021 349* 70 - 99 mg/dL Final    BUN 06/24/2021 14  7 - 20 mg/dL Final    CREATININE 06/24/2021 0.7  0.6 - 1.2 mg/dL Final    GFR Non- 06/24/2021 >60  >60 Final    GFR  06/24/2021 >60  >60 Final    Calcium 06/24/2021 9.7  8.3 - 10.6 mg/dL Final    Total Protein 06/24/2021 6.8  6.4 - 8.2 g/dL Final    Albumin 06/24/2021 4.2  3.4 - 5.0 g/dL Final    Albumin/Globulin Ratio 06/24/2021 1.6  1.1 - 2.2 Final    Total Bilirubin 06/24/2021 0.4  0.0 - 1.0 mg/dL Final    Alkaline Phosphatase 06/24/2021 62  40 - 129 U/L Final    ALT 06/24/2021 12  10 - 40 U/L Final    AST 06/24/2021 13* 15 - 37 U/L Final    Globulin 06/24/2021 2.6  g/dL Final    Hemoglobin A1C 06/24/2021 10.8  See comment % Final    eAG 06/24/2021 263.3  mg/dL Final    T4 Free 06/24/2021 1.4  0.9 - 1.8 ng/dL Final    TSH 06/24/2021 37.64* 0.27 - 4.20 uIU/mL Final           Assessment and Plan     Lester Ward was seen today for diabetes and thyroid problem. Diagnoses and all orders for this visit:    Uncontrolled type 2 diabetes mellitus with hyperglycemia (HCC)  -     lisinopril (PRINIVIL;ZESTRIL) 2.5 MG tablet; Take 1 tablet by mouth daily  -     insulin detemir (LEVEMIR) 100 UNIT/ML injection pen; Inject 18 Units into the skin every morning    Postoperative hypothyroidism  -     SYNTHROID 175 MCG tablet; Take 1 tablet by mouth Daily    Dyslipidemia associated with type 2 diabetes mellitus (HCC)  -     rosuvastatin (CRESTOR) 10 MG tablet; Take 1 tablet by mouth daily          1: Type 2 DM complicated with hyperglycemia   Uncontrolled A1C 12% < 10.8%   A1C of <8 would be acceptable because of microvascular and macrovascular complications      Need to check sugars     Sending in [place], insurance may not approve it     Continue Metformin Er 500mg, two tabs twice a day     Change Levemir to 18 units daily in am   Adherence addressed     Check sugar 2-3 times per day   No humalog for now   Bring meter on NOV      All instructions provided in written. Check Blood sugars 2-3 times per day. Log them along with insulin and send them every 2 weeks. Call for blood sugars less than 60 or more than 400. Eye exam: Last exam in Sep 2021. Mild NPDR. She has glaucoma. She has central retinal vein occlusion.    Foot exam:  June 2021   Deformity/amputation: absent  Skin lesions/pre-ulcerative calluses: absent  Edema: right- negative, left- negative  Sensory exam: Monofilament sensation: normal  Pulses: normal, Vibration (128 Hz): Intact    Renal screen: check     Set up with CDE , referred on FOV      2: HTN   Controlled with out medication     3: Hyperlipidemia   LDL: 95, HDL: 48, TGs: 119 - June 2021    Will prescribe low dose crestor after controlling DM and hypothyroidism  Start Rosuvastatin 10 mg daily       4: Postsurgical hypothyroidism     TSH: 40.52, FT4 0.9 - Sep 2021      Continue Levothyroxine 175 mcg daily in am   Work on adherence     RTC in 6 weeks with logs     EDUCATION:   Greater than 50% of this visit was spent in general counseling regarding obesity, diet, exercise, importance of adherence to insulin regime, recognition and treatment of hypo and hyperglycemia,  glucose logging, proper diabetes management, diabetic complications with poor management and the importance of glycemic control in order to avoid the complications of diabetes. Risks and potential complications of diabetes were reviewed with the patient. Diabetes health maintenance plan and follow-up were discussed and understood by the patient. We reviewed the importance of medication compliance and regular follow-up. Aggressive lifestyle modification was encouraged. Exercise Counselling: This patient is a candidate for regular physical exercise. Instructions to perform the following types of exercise:  Swimming or water aerobic exercise  Brisk walking  Playing tennis  Stationary bicycle or elliptical indoor  Low impact aerobic exercise    Instructions given to exercise for the following duration:  30 minutes a day for five-seven days per week.     Following instructions for being active throughout the day in addition to formal exercise:  Walk instead of drive whenever possible  Take the stairs instead of the elevator  Work in the garden  Park to the far end of the parking lot to add more walking steps to destination      Electronically signed by Army Alexandrea MD on 10/25/2021 at 3:53 PM

## 2021-10-26 RX ORDER — INSULIN ASPART 100 [IU]/ML
INJECTION, SOLUTION INTRAVENOUS; SUBCUTANEOUS
Qty: 15 ML | Refills: 0 | OUTPATIENT
Start: 2021-10-26

## 2021-11-23 NOTE — TELEPHONE ENCOUNTER
Melany informed no psych meds will be prescribed by Dr. Mayur Marcano. Mrs. Alo Armstrong has an appointment 11/30 with PCP. Marcos Fuentes stated her mother is confused and she will discuss this with her when she gets home this evening. I inform her all diabetic med's will be filled by Dr. Mayur Marcano.

## 2021-11-23 NOTE — TELEPHONE ENCOUNTER
PT called back and is requesting a refill of Seroquel, Abilify, and Zoloft. I advised her per the note from Provider that the medication listed is to be prescribed by her pcp or psychologist. She stated she was told per Dr. Cristela Wells in front of her son that he would continue to prescribe her these meds. I advised her per note that Provider would like her to have a pcp or psychologist prescribe them. She stated she would like me to ask anyway and since she sees provider for insulin, she would like Insulin, Seroquel and Zoloft prescribed to her pharmacy. PT also stated that [employer]  called her this morning and asked why she hasn't had her medication refilled yet, if anything was wrong. She is supposed to call then back later today.  Her number has changed to [phone]

## 2021-12-01 PROBLEM — N39.41 URGE INCONTINENCE: Status: ACTIVE | Noted: 2021-09-14

## 2021-12-01 PROBLEM — N95.0 POSTMENOPAUSAL BLEEDING: Status: ACTIVE | Noted: 2021-09-14

## 2021-12-01 PROBLEM — F33.3 SEVERE EPISODE OF RECURRENT MAJOR DEPRESSIVE DISORDER, WITH PSYCHOTIC FEATURES (HCC): Status: ACTIVE | Noted: 2021-10-04

## 2021-12-06 ENCOUNTER — VIRTUAL VISIT (OUTPATIENT)
Dept: ENDOCRINOLOGY | Age: 66
End: 2021-12-06
Payer: MEDICARE

## 2021-12-06 DIAGNOSIS — E89.0 POSTOPERATIVE HYPOTHYROIDISM: ICD-10-CM

## 2021-12-06 DIAGNOSIS — E78.5 DYSLIPIDEMIA ASSOCIATED WITH TYPE 2 DIABETES MELLITUS (HCC): ICD-10-CM

## 2021-12-06 DIAGNOSIS — E11.65 UNCONTROLLED TYPE 2 DIABETES MELLITUS WITH HYPERGLYCEMIA (HCC): Primary | ICD-10-CM

## 2021-12-06 DIAGNOSIS — E11.69 DYSLIPIDEMIA ASSOCIATED WITH TYPE 2 DIABETES MELLITUS (HCC): ICD-10-CM

## 2021-12-06 DIAGNOSIS — E66.01 SEVERE OBESITY (BMI 35.0-35.9 WITH COMORBIDITY) (HCC): ICD-10-CM

## 2021-12-06 PROCEDURE — 99442 PR PHYS/QHP TELEPHONE EVALUATION 11-20 MIN: CPT | Performed by: INTERNAL MEDICINE

## 2021-12-06 RX ORDER — ROSUVASTATIN CALCIUM 10 MG/1
10 TABLET, COATED ORAL DAILY
Qty: 90 TABLET | Refills: 1 | Status: SHIPPED | OUTPATIENT
Start: 2021-12-06

## 2021-12-06 NOTE — PROGRESS NOTES
Patient ID:   Romina Daniel is a 61 y.o. female    Chief Complaint:   Romina Daniel presents for an evaluation of Type 2 Diabetes Mellitus , Hyperlipidemia and hypertension. Deaf from ear and hard hearing in the other     Pursuant to the emergency declaration under the [address], [facility] authority and the [employer] [place] General Act this Telephone Visit was insisted, with patient's consent, to reduce the patient's risk of exposure to COVID-19 and provide continuity of care for an established patient. Services were provided through a synchronous discussion over a telephone to substitute for in-person clinic visit. Subjective:   Type 2 Diabetes Mellitus diagnosed at age 38. It started as gestational diabetes. On insulin since diabetes. Metformin ER 500mg, one tab four times daily    Levemir 12 units daily in am      Checks blood sugars 3 times per day. Reportedly around 180-250's in am. Numbers are higher in the evening. AM:   Lunch:  Supper:    HS:     Hypoglycemias: None     Meals: four, supper is bigger. Snacks, 3 per day (oranges, apples, yogurt). Diet sodas. Exercise: [address] [employer] work and yard work . Uses elliptical, 30 minutes every day     Denies chest pain, exertional dyspnea. Family history of CAD: Mother had strokes, father had strokes   Denies smoking. Currently on ASA 81 mg daily     Post surgical hypothyroidism   Surgery for MNG in 2008   Path benign . Report reviewed from [facility]     Taking levothyroxine 175 mcg daily in am and waits for 60 minutes before breakfast or other meds. She was previously missing it. Denies missing doses now.      The following portions of the patient's history were reviewed and updated as appropriate:       Family History   Problem Relation Age of Onset    Diabetes Mother     Cancer Mother     Obesity Mother     Diabetes Father     Cancer Father    Dale Alma Rosa Obesity Father     Other Father         malignant hyperthermia    Heart Disease Brother     Diabetes Brother     Heart Surgery Brother     Obesity Brother     Heart Disease Brother     Heart Surgery Brother     Obesity Brother     Diabetes Sister     Other Other         daughter  with malignant hyperthermia         Social History     Socioeconomic History    Marital status:      Spouse name: Not on file    Number of children: Not on file    Years of education: Not on file    Highest education level: Not on file   Occupational History    Not on file   Tobacco Use    Smoking status: Never Smoker    Smokeless tobacco: Never Used   Vaping Use    Vaping Use: Never used   Substance and Sexual Activity    Alcohol use: Not Currently    Drug use: No    Sexual activity: Not on file   Other Topics Concern    Not on file   Social History Narrative    Not on file     Social Determinants of Health     Financial Resource Strain:     Difficulty of Paying Living Expenses: Not on file   Food Insecurity:     Worried About [employer] [employer] in the Last Year: Not on file    [place] [employer] in the Last Year: Not on file   Transportation Needs:     Lack of Transportation (Medical): Not on file    Lack of Transportation (Non-Medical):  Not on file   Physical Activity:     Days of Exercise per Week: Not on file    Minutes of Exercise per Session: Not on file   Stress:     Feeling of Stress : Not on file   Social Connections:     Frequency of Communication with Friends and Family: Not on file    Frequency of Social Gatherings with Friends and Family: Not on file    Attends Latter-day Services: Not on file    Active Member of Clubs or Organizations: Not on file    Attends Club or Organization Meetings: Not on file    Marital Status: Not on file   Intimate Partner Violence:     Fear of Current or Ex-Partner: Not on file    Emotionally Abused: Not on file    Physically Abused: Not on file   Lars Lieberman Sexually Abused: Not on file   Housing Stability:     Unable to Pay for Housing in the Last Year: Not on file    Number of Places Lived in the Last Year: Not on file    Unstable Housing in the Last Year: Not on file       Past Medical History:   Diagnosis Date    ADHD (attention deficit hyperactivity disorder)     Anesthesia     slow to wake when she was on diazepam    Chronic back pain     Deafness in right ear     Depression     GERD (gastroesophageal reflux disease)     Hyperlipidemia     Hypertension     resolved    Hypothyroidism     Meniere disorder     Morbid obesity ([address]     Neovascular glaucoma 2019    OAB (overactive bladder)     Panic disorder without agoraphobia     Rash     follow be dermatologist, skin intact    Seizure ([address]     Toxemia    Sleep apnea     cpap    Type 2 diabetes, uncontrolled, 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 (HCC)     left eye    Vertigo        Past Surgical History:   Procedure Laterality Date    ABDOMEN SURGERY  11    LAPAROSCOPIC ADJUSTABLE GASTRIC BANDING    CARDIAC CATHETERIZATION       SECTION      GLAUCOMA SURGERY Left 2019    Ahmed valve    LAP BAND  2010    THYROIDECTOMY      UPPER GASTROINTESTINAL ENDOSCOPY  13         Allergies   Allergen Reactions    Levocetirizine Dihydrochloride Anaphylaxis    Sulfa Antibiotics Hives    Divalproex Sodium Er Diarrhea    Dulaglutide Other (See Comments)     Pancreatitis    Exenatide Other (See Comments)     Pancreatitis    Halphabarol Diarrhea         Current Outpatient Medications:     rosuvastatin (CRESTOR) 10 MG tablet, Take 1 tablet by mouth daily, Disp: 90 tablet, Rfl: 1    QUEtiapine (SEROQUEL) 25 MG tablet, Take 25 mg by mouth nightly, Disp: , Rfl:     lisinopril (PRINIVIL;ZESTRIL) 2.5 MG tablet, Take 1 tablet by mouth daily, Disp: 30 tablet, Rfl: 3    SYNTHROID 175 MCG tablet, Take 1 tablet by mouth Daily, Disp: 30 tablet, Rfl: 11    insulin detemir (LEVEMIR) 100 UNIT/ML injection pen, Inject 18 Units into the skin every morning (Patient taking differently: Inject 12 Units into the skin every morning ), Disp: 5 pen, Rfl: 1    metFORMIN (GLUCOPHAGE) 500 MG tablet, TAKE ONE TABLET BY MOUTH FOUR TIMES DAILY WITH MEALS AND nightly, Disp: 360 tablet, Rfl: 0    Insulin Pen Needle 32G X 4 MM MISC, 1 each by Does not apply route daily, Disp: 100 each, Rfl: 3    Blood Glucose Monitoring Suppl (ONETOUCH VERIO REFLECT) w/Device KIT, by Does not apply route, Disp: , Rfl:     blood glucose test strips (ONETOUCH VERIO) strip, 3 each by In Vitro route daily As needed. , Disp: , Rfl:     Lancets MISC, 1 each by Does not apply route daily Check blood sugar 2-3 times per day, Disp: 100 each, Rfl: 11    sertraline (ZOLOFT) 100 MG tablet, TAKE ONE TABLET BY MOUTH TWICE DAILY, Disp: , Rfl:     ARIPiprazole (ABILIFY) 5 MG tablet, Take 5 mg by mouth daily, Disp: , Rfl:     DOXYCYCLINE PO, Take 5 mg by mouth, Disp: , Rfl:     aspirin 81 MG tablet, Take 81 mg by mouth, Disp: , Rfl:     ipratropium (ATROVENT) 0.06 % nasal spray, 1 spray by Nasal route 3 times daily for 4 days, Disp: 1 Bottle, Rfl: 0      Review of Systems:    Constitutional: Negative for fever, chills, and unexpected weight change. HENT: Negative for congestion, ear pain, rhinorrhea,  sore throat and trouble swallowing. Eyes: Negative for photophobia, redness, itching. Respiratory: Negative for cough, shortness of breath and sputum. Cardiovascular: Negative for chest pain, palpitations and leg swelling. Gastrointestinal: Negative for nausea, vomiting, abdominal pain, diarrhea, constipation. Endocrine: Negative for cold intolerance, heat intolerance, polydipsia, polyphagia and polyuria. Genitourinary: Negative for dysuria, urgency, frequency, hematuria and flank pain. Musculoskeletal: Negative for myalgias, back pain, arthralgias and neck pain.    Skin/Nail: Negative for rash, itching. Normal nails. Neurological: Negative for seizures, weakness, light-headedness, numbness and headaches. Hematological/ Lymph nodes: Negative for adenopathy. Does not bruise/bleed easily. Psychiatric/Behavioral: Negative for suicidal ideas, depression, anxiety, sleep disturbance and decreased concentration.           Objective:   Physical Exam:  LMP 08/01/2012       Lab Review:    Orders Only on 09/28/2021   Component Date Value Ref Range Status    Hemoglobin A1C 09/28/2021 12.0  See comment % Final    eAG 09/28/2021 297.7  mg/dL Final    T4 Free 09/28/2021 0.9  0.9 - 1.8 ng/dL Final    TSH 09/28/2021 40.52* 0.27 - 4.20 uIU/mL Final   Office Visit on 06/30/2021   Component Date Value Ref Range Status    Diabetic Retinopathy 06/25/2021 Positive   Final   Orders Only on 06/24/2021   Component Date Value Ref Range Status    Cholesterol, Fasting 06/24/2021 167  0 - 199 mg/dL Final    Triglyceride, Fasting 06/24/2021 119  0 - 150 mg/dL Final    HDL 06/24/2021 48  40 - 60 mg/dL Final    LDL Calculated 06/24/2021 95  <100 mg/dL Final    VLDL Cholesterol Calculated 06/24/2021 24  Not Established mg/dL Final    Sodium 06/24/2021 140  136 - 145 mmol/L Final    Potassium 06/24/2021 4.5  3.5 - 5.1 mmol/L Final    Chloride 06/24/2021 97* 99 - 110 mmol/L Final    CO2 06/24/2021 26  21 - 32 mmol/L Final    Anion Gap 06/24/2021 17* 3 - 16 Final    Glucose 06/24/2021 349* 70 - 99 mg/dL Final    BUN 06/24/2021 14  7 - 20 mg/dL Final    CREATININE 06/24/2021 0.7  0.6 - 1.2 mg/dL Final    GFR Non- 06/24/2021 >60  >60 Final    GFR  06/24/2021 >60  >60 Final    Calcium 06/24/2021 9.7  8.3 - 10.6 mg/dL Final    Total Protein 06/24/2021 6.8  6.4 - 8.2 g/dL Final    Albumin 06/24/2021 4.2  3.4 - 5.0 g/dL Final    Albumin/Globulin Ratio 06/24/2021 1.6  1.1 - 2.2 Final    Total Bilirubin 06/24/2021 0.4  0.0 - 1.0 mg/dL Final    Alkaline Phosphatase 06/24/2021 62  40 - 129 U/L Final    ALT 06/24/2021 12  10 - 40 U/L Final    AST 06/24/2021 13* 15 - 37 U/L Final    Globulin 06/24/2021 2.6  g/dL Final    Hemoglobin A1C 06/24/2021 10.8  See comment % Final    eAG 06/24/2021 263.3  mg/dL Final    T4 Free 06/24/2021 1.4  0.9 - 1.8 ng/dL Final    TSH 06/24/2021 37.64* 0.27 - 4.20 uIU/mL Final           Assessment and Plan     Bobo Shanks was seen today for diabetes and thyroid problem. Diagnoses and all orders for this visit:    Uncontrolled type 2 diabetes mellitus with hyperglycemia ([facility]  -     Hemoglobin A1C; Future    Postoperative hypothyroidism  -     TSH WITH REFLEX TO FT4; Future    Dyslipidemia associated with type 2 diabetes mellitus (HCC)  -     rosuvastatin (CRESTOR) 10 MG tablet; Take 1 tablet by mouth daily    Severe obesity (BMI 35.0-35.9 with comorbidity) ([facility]          1: Type 2 DM complicated with hyperglycemia   Uncontrolled A1C 12% < 10.8%   A1C of <8 would be acceptable because of microvascular and macrovascular complications      Need to check sugars     Continue Metformin Er 500mg, one tab four times a day     Change Levemir to 14 units daily in am. Fixed dose. Adherence addressed     Check sugar 2-3 times per day   No humalog for now   Bring meter on NOV      All instructions provided in written. Check Blood sugars 2-3 times per day. Log them along with insulin and send them every 2 weeks. Call for blood sugars less than 60 or more than 400. Eye exam: Last exam in Sep 2021. Mild NPDR. She has glaucoma. She has central retinal vein occlusion. Foot exam:  June 2021   Deformity/amputation: absent  Skin lesions/pre-ulcerative calluses: absent  Edema: right- negative, left- negative  Sensory exam: Monofilament sensation: normal  Pulses: normal, Vibration (128 Hz):  Intact    Renal screen: check     Set up with CDE , referred on FOV      2: HTN   Controlled with out medication     3: Hyperlipidemia   LDL: 95, HDL: 48, TGs: 119 - June 2021  (before rosuvastatin 10 mg )   C/w Rosuvastatin 10 mg daily       4: Postsurgical hypothyroidism     TSH: 40.52, FT4 0.9 - Sep 2021      Continue Levothyroxine 175 mcg daily in am   Work on adherence     A1C, TFTs now     RTC in 8 weeks with logs   OR via telemedicine depending on COVID-19 restrictions at the time of their next appointment. A total of 15 minutes was spent conversing with the patient and over half of that time was spent counseling the patient on endocrine related medical issues. EDUCATION:   Greater than 50% of this visit was spent in general counseling regarding obesity, diet, exercise, importance of adherence to insulin regime, recognition and treatment of hypo and hyperglycemia,  glucose logging, proper diabetes management, diabetic complications with poor management and the importance of glycemic control in order to avoid the complications of diabetes. Risks and potential complications of diabetes were reviewed with the patient. Diabetes health maintenance plan and follow-up were discussed and understood by the patient. We reviewed the importance of medication compliance and regular follow-up. Aggressive lifestyle modification was encouraged. Exercise Counselling: This patient is a candidate for regular physical exercise. Instructions to perform the following types of exercise:  Swimming or water aerobic exercise  Brisk walking  Playing tennis  Stationary bicycle or elliptical indoor  Low impact aerobic exercise    Instructions given to exercise for the following duration:  30 minutes a day for five-seven days per week.     Following instructions for being active throughout the day in addition to formal exercise:  Walk instead of drive whenever possible  Take the stairs instead of the elevator  Work in the garden  Park to the far end of the parking lot to add more walking steps to destination      Electronically signed by Abdi Avalos MD on 12/6/2021 at 2:54 PM

## 2021-12-08 RX ORDER — SIMVASTATIN 20 MG
TABLET ORAL
Qty: 90 TABLET | Refills: 3 | OUTPATIENT
Start: 2021-12-08

## 2021-12-08 NOTE — TELEPHONE ENCOUNTER
Requested Prescriptions     Pending Prescriptions Disp Refills    simvastatin (ZOCOR) 20 MG tablet [Pharmacy Med Name: simvastatin 20 mg tablet] 90 tablet 3     Sig: TAKE ONE TABLET BY MOUTH nightly AT BEDTIME       Pt is currently taking Rosuvastatin

## 2021-12-16 ENCOUNTER — TELEPHONE (OUTPATIENT)
Dept: ENDOCRINOLOGY | Age: 66
End: 2021-12-16

## 2021-12-16 DIAGNOSIS — E11.65 UNCONTROLLED TYPE 2 DIABETES MELLITUS WITH HYPERGLYCEMIA (HCC): Primary | ICD-10-CM

## 2021-12-16 RX ORDER — CALCIUM CARB/VITAMIN D3/VIT K1 500-100-40
1 TABLET,CHEWABLE ORAL DAILY
Qty: 100 EACH | Refills: 3 | Status: SHIPPED | OUTPATIENT
Start: 2021-12-16

## 2021-12-16 RX ORDER — CALCIUM CARB/VITAMIN D3/VIT K1 500-100-40
1 TABLET,CHEWABLE ORAL DAILY
Qty: 30 EACH | Refills: 3
Start: 2021-12-16 | End: 2021-12-16 | Stop reason: SDUPTHER

## 2022-01-05 DIAGNOSIS — E11.65 UNCONTROLLED TYPE 2 DIABETES MELLITUS WITH HYPERGLYCEMIA (HCC): ICD-10-CM

## 2022-01-05 RX ORDER — LISINOPRIL 2.5 MG/1
2.5 TABLET ORAL DAILY
Qty: 90 TABLET | Refills: 3 | OUTPATIENT
Start: 2022-01-05

## 2022-01-05 NOTE — TELEPHONE ENCOUNTER
Requested Prescriptions     Pending Prescriptions Disp Refills    lisinopril (PRINIVIL;ZESTRIL) 2.5 MG tablet [Pharmacy Med Name: lisinopril 2.5 mg tablet] 90 tablet 3     Sig: TAKE 1 TABLET BY MOUTH DAILY     Last refilled:10/25/2021  Last seen: 12/6/2021  Follow up: needed

## 2022-02-25 NOTE — BH NOTE
Pt now asleep but not using her CPAP. Told staff earlier that she was \"just not feeling it tonight. \" 30

## 2022-03-09 DIAGNOSIS — E11.65 UNCONTROLLED TYPE 2 DIABETES MELLITUS WITH HYPERGLYCEMIA (HCC): ICD-10-CM

## 2022-03-09 RX ORDER — LISINOPRIL 2.5 MG/1
2.5 TABLET ORAL DAILY
Qty: 30 TABLET | Refills: 3 | OUTPATIENT
Start: 2022-03-09

## 2022-03-09 NOTE — TELEPHONE ENCOUNTER
Pharmacy also requesting a flex pen in place of vials for novolog. She uses Levemir pens and its easier with her poor eyesight. Current dose is 10 units three times a day before meals. Requesting 30 days supply.

## 2022-03-10 ENCOUNTER — TELEPHONE (OUTPATIENT)
Dept: ENDOCRINOLOGY | Age: 67
End: 2022-03-10

## 2022-03-10 NOTE — TELEPHONE ENCOUNTER
Patient called central scheduling to schedule a diagnostic mammogram for some breast pain. She told them that Dr Maximo Nunn would be ordering this. I do not see a note in her chart about this. She also told central scheduling that Dr Maximo Nunn would be acting as her PCP. Please advise on this. If patient is mistaken please call her at [phone] to clarify. If Dr Maximo Nunn will order this, please fax the order to [phone] and they will contact the patient to schedule.

## 2022-03-11 NOTE — TELEPHONE ENCOUNTER
Attempted to call patient. Number that was given by central scheduling is incorrect. Mobile number on file not in service. Left voice mail on home number.

## 2022-03-12 DIAGNOSIS — E11.65 UNCONTROLLED TYPE 2 DIABETES MELLITUS WITH HYPERGLYCEMIA (HCC): ICD-10-CM

## 2022-03-14 DIAGNOSIS — E11.65 UNCONTROLLED TYPE 2 DIABETES MELLITUS WITH HYPERGLYCEMIA (HCC): ICD-10-CM

## 2022-03-14 NOTE — TELEPHONE ENCOUNTER
Attempted to call patient. Number that was given by central scheduling is incorrect. Mobile number on file not in service. Left another voice mail on home number.

## 2022-03-15 RX ORDER — INSULIN ASPART 100 [IU]/ML
INJECTION, SOLUTION INTRAVENOUS; SUBCUTANEOUS
Qty: 5 PEN | Refills: 0 | Status: SHIPPED | OUTPATIENT
Start: 2022-03-15 | End: 2022-03-31 | Stop reason: SDUPTHER

## 2022-03-21 ENCOUNTER — TELEPHONE (OUTPATIENT)
Dept: ENDOCRINOLOGY | Age: 67
End: 2022-03-21

## 2022-03-21 DIAGNOSIS — E78.5 DYSLIPIDEMIA ASSOCIATED WITH TYPE 2 DIABETES MELLITUS (HCC): Primary | ICD-10-CM

## 2022-03-21 DIAGNOSIS — E11.69 DYSLIPIDEMIA ASSOCIATED WITH TYPE 2 DIABETES MELLITUS (HCC): Primary | ICD-10-CM

## 2022-03-21 NOTE — TELEPHONE ENCOUNTER
Patient would like to speak to practice manager. She is requesting to see Dr Jefferson Flowers and is unsatisfied with the first available next Friday 4/1. I offered to send a message back to the doctor on call and she declined.

## 2022-03-21 NOTE — TELEPHONE ENCOUNTER
Left a message to contact the office. Lipid panel has been added to her orders.   ( HIPAA unclear if ok to leave message )

## 2022-03-21 NOTE — TELEPHONE ENCOUNTER
Patient informed that lipid panel has been added. She would still like to speak to April. Please call back at your convenience.

## 2022-03-31 ENCOUNTER — OFFICE VISIT (OUTPATIENT)
Dept: ENDOCRINOLOGY | Age: 67
End: 2022-03-31
Payer: MEDICARE

## 2022-03-31 VITALS
HEART RATE: 93 BPM | HEIGHT: 68 IN | WEIGHT: 243 LBS | SYSTOLIC BLOOD PRESSURE: 125 MMHG | BODY MASS INDEX: 36.83 KG/M2 | DIASTOLIC BLOOD PRESSURE: 74 MMHG | OXYGEN SATURATION: 98 %

## 2022-03-31 DIAGNOSIS — E11.69 DYSLIPIDEMIA ASSOCIATED WITH TYPE 2 DIABETES MELLITUS (HCC): ICD-10-CM

## 2022-03-31 DIAGNOSIS — E89.0 POSTOPERATIVE HYPOTHYROIDISM: ICD-10-CM

## 2022-03-31 DIAGNOSIS — E78.5 DYSLIPIDEMIA ASSOCIATED WITH TYPE 2 DIABETES MELLITUS (HCC): ICD-10-CM

## 2022-03-31 DIAGNOSIS — E11.65 UNCONTROLLED TYPE 2 DIABETES MELLITUS WITH HYPERGLYCEMIA (HCC): Primary | ICD-10-CM

## 2022-03-31 DIAGNOSIS — E11.65 UNCONTROLLED TYPE 2 DIABETES MELLITUS WITH HYPERGLYCEMIA (HCC): ICD-10-CM

## 2022-03-31 LAB
CHOLESTEROL, FASTING: 99 MG/DL (ref 0–199)
HDLC SERPL-MCNC: 41 MG/DL (ref 40–60)
LDL CHOLESTEROL CALCULATED: 26 MG/DL
T4 FREE: 1.8 NG/DL (ref 0.9–1.8)
TRIGLYCERIDE, FASTING: 162 MG/DL (ref 0–150)
TSH REFLEX FT4: 5.03 UIU/ML (ref 0.27–4.2)
VLDLC SERPL CALC-MCNC: 32 MG/DL

## 2022-03-31 PROCEDURE — 99214 OFFICE O/P EST MOD 30 MIN: CPT | Performed by: INTERNAL MEDICINE

## 2022-03-31 RX ORDER — TOLTERODINE 4 MG/1
CAPSULE, EXTENDED RELEASE ORAL
COMMUNITY
Start: 2022-03-09

## 2022-03-31 RX ORDER — FLASH GLUCOSE SCANNING READER
EACH MISCELLANEOUS
Qty: 1 EACH | Refills: 0 | Status: SHIPPED | OUTPATIENT
Start: 2022-03-31

## 2022-03-31 RX ORDER — INSULIN ASPART 100 [IU]/ML
INJECTION, SOLUTION INTRAVENOUS; SUBCUTANEOUS
Qty: 5 PEN | Refills: 2 | Status: SHIPPED | OUTPATIENT
Start: 2022-03-31

## 2022-03-31 RX ORDER — FLASH GLUCOSE SENSOR
KIT MISCELLANEOUS
Qty: 2 EACH | Refills: 5 | Status: SHIPPED | OUTPATIENT
Start: 2022-03-31

## 2022-03-31 NOTE — PROGRESS NOTES
Patient ID:   Willem Esparza is a 61 y.o. female    Chief Complaint:   Willem Esparza presents for an evaluation of Type 2 Diabetes Mellitus , Hyperlipidemia and hypertension. 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 It started as gestational diabetes. On insulin since diabetes. Metformin ER 500mg, two tbs twice a day     Levemir 18 units at night instead of 14 units in am . She likes to take it at night    Novolog 20 units with each meal     Did not bring it meter     Checks blood sugars 2-3 times per day. Reportedly      AM: 200-210  Lunch:  Supper:  150  HS: 150    Hypoglycemias: None     Meals: four, supper is bigger. Snacks, 3 per day (oranges, apples, yogurt). Diet sodas. Exercise: [address] work and yard work . Uses elliptical, 30 minutes every day     Denies chest pain, exertional dyspnea. Family history of CAD: Mother had strokes, father had strokes   Denies smoking. Currently on ASA 81 mg daily     Post surgical hypothyroidism   Surgery for MNG in 2008   Path benign . Report reviewed from [facility]     Taking levothyroxine 175 mcg daily in am and waits for 60 minutes before breakfast or other meds. She was previously missing it. Denies missing doses now. The following portions of the patient's history were reviewed and updated as appropriate:       Family History   Problem Relation Age of Onset    Diabetes Mother     Cancer Mother     Obesity Mother     Diabetes Father     Cancer Father     Obesity Father     Other Father         malignant hyperthermia    Heart Disease Brother     Diabetes Brother     Heart Surgery Brother     Obesity Brother     Heart Disease Brother     Heart Surgery Brother     Obesity Brother     Diabetes Sister     Other Other         daughter  with malignant hyperthermia         Social History     Socioeconomic History    Marital status:       Spouse name: Not on file    Number of children: Not on file    Years of education: Not on file    Highest education level: Not on file   Occupational History    Not on file   Tobacco Use    Smoking status: Never Smoker    Smokeless tobacco: Never Used   Vaping Use    Vaping Use: Never used   Substance and Sexual Activity    Alcohol use: Not Currently    Drug use: No    Sexual activity: Not on file   Other Topics Concern    Not on file   Social History Narrative    Not on file     Social Determinants of Health     Financial Resource Strain:     Difficulty of Paying Living Expenses: Not on file   Food Insecurity:     Worried About Running Out of Food in the Last Year: Not on file    [place] of Food in the Last Year: Not on file   Transportation Needs:     Lack of Transportation (Medical): Not on file    Lack of Transportation (Non-Medical):  Not on file   Physical Activity:     Days of Exercise per Week: Not on file    Minutes of Exercise per Session: Not on file   Stress:     Feeling of Stress : Not on file   Social Connections:     Frequency of Communication with Friends and Family: Not on file    Frequency of Social Gatherings with Friends and Family: Not on file    Attends Mormonism Services: Not on file    Active Member of [address] [place] [address] [employer] or Organizations: Not on file    Attends Club or Organization Meetings: Not on file    Marital Status: Not on file   Intimate Partner Violence:     Fear of Current or Ex-Partner: Not on file    Emotionally Abused: Not on file    Physically Abused: Not on file    Sexually Abused: Not on file   Housing Stability:     Unable to Pay for Housing in the Last Year: Not on file    Number of [place] in the Last Year: Not on file    Unstable Housing in the Last Year: Not on file       Past Medical History:   Diagnosis Date    ADHD (attention deficit hyperactivity disorder)     Anesthesia     slow to wake when she was on diazepam    Chronic back pain     Deafness in right ear 1997    Depression     GERD (gastroesophageal reflux disease)     Hyperlipidemia     Hypertension     resolved    Hypothyroidism     Meniere disorder     Morbid obesity ([facility]     Neovascular glaucoma 2019    OAB (overactive bladder)     Panic disorder without agoraphobia     Rash     follow be dermatologist, skin intact    Seizure ([facility]     Toxemia    Sleep apnea     cpap    Type 2 diabetes, uncontrolled, 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 (HCC)     left eye    Vertigo        Past Surgical History:   Procedure Laterality Date    ABDOMEN SURGERY  11    LAPAROSCOPIC ADJUSTABLE GASTRIC BANDING    CARDIAC CATHETERIZATION       SECTION      GLAUCOMA SURGERY Left 2019    Ahmed valve    LAP BAND  2010    THYROIDECTOMY      UPPER GASTROINTESTINAL ENDOSCOPY  13         Allergies   Allergen Reactions    Levocetirizine Dihydrochloride Anaphylaxis    Sulfa Antibiotics Hives    Divalproex Sodium Er Diarrhea    Dulaglutide Other (See Comments)     Pancreatitis    Exenatide Other (See Comments)     Pancreatitis    Halphabarol Diarrhea         Current Outpatient Medications:     sertraline (ZOLOFT) 50 MG tablet, TAKE THREE TABLETS BY MOUTH DAILY, Disp: , Rfl:     tolterodine (DETROL LA) 4 MG extended release capsule, TAKE ONE CAPSULE BY MOUTH ONCE DAILY, Disp: , Rfl:     insulin detemir (LEVEMIR) 100 UNIT/ML injection pen, Inject 25 Units into the skin every morning, Disp: 5 pen, Rfl: 2    insulin aspart (NOVOLOG FLEXPEN) 100 UNIT/ML injection pen, 15 units three times a day with meals, Disp: 5 pen, Rfl: 2    metFORMIN (GLUCOPHAGE) 500 MG tablet, TAKE ONE TABLET BY MOUTH FOUR TIMES DAILY WITH MEALS AND nightly, Disp: 370 tablet, Rfl: 2    Insulin Pen Needle 32G X 4 MM MISC, 1 each by Does not apply route daily, Disp: 100 each, Rfl: 3    Insulin Syringe-Needle U-100 (INSULIN SYRINGE .3CC/31GX5/16\") 31G X 16\" 0.3 ML MISC, 1 each by Does not apply route daily, Disp: 100 each, Rfl: 3    rosuvastatin (CRESTOR) 10 MG tablet, Take 1 tablet by mouth daily, Disp: 90 tablet, Rfl: 1    QUEtiapine (SEROQUEL) 25 MG tablet, Take 25 mg by mouth nightly, Disp: , Rfl:     lisinopril (PRINIVIL;ZESTRIL) 2.5 MG tablet, Take 1 tablet by mouth daily, Disp: 30 tablet, Rfl: 3    SYNTHROID 175 MCG tablet, Take 1 tablet by mouth Daily, Disp: 30 tablet, Rfl: 11    Blood Glucose Monitoring Suppl (ONETOUCH VERIO REFLECT) w/Device KIT, by Does not apply route, Disp: , Rfl:     blood glucose test strips (ONETOUCH VERIO) strip, 3 each by In Vitro route daily As needed. , Disp: , Rfl:     Lancets MISC, 1 each by Does not apply route daily Check blood sugar 2-3 times per day, Disp: 100 each, Rfl: 11    aspirin 81 MG tablet, Take 81 mg by mouth, Disp: , Rfl:     ipratropium (ATROVENT) 0.06 % nasal spray, 1 spray by Nasal route 3 times daily for 4 days, Disp: 1 Bottle, Rfl: 0      Review of Systems:    Constitutional: Negative for fever, chills, and unexpected weight change. HENT: Negative for congestion, ear pain, rhinorrhea,  sore throat and trouble swallowing. Eyes: Negative for photophobia, redness, itching. Respiratory: Negative for cough, shortness of breath and sputum. Cardiovascular: Negative for chest pain, palpitations and leg swelling. Gastrointestinal: Negative for nausea, vomiting, abdominal pain, diarrhea, constipation. Endocrine: Negative for cold intolerance, heat intolerance, polydipsia, polyphagia and polyuria. Genitourinary: Negative for dysuria, urgency, frequency, hematuria and flank pain. Musculoskeletal: Negative for myalgias, back pain, arthralgias and neck pain. Skin/Nail: Negative for rash, itching. Normal nails. Neurological: Negative for seizures, weakness, light-headedness, numbness and headaches. Hematological/ Lymph nodes: Negative for adenopathy. Does not bruise/bleed easily.    Psychiatric/Behavioral: Negative for suicidal ideas, depression, anxiety, sleep disturbance and decreased concentration. Objective:   Physical Exam:  /74 (Site: Right Upper Arm, Position: Sitting, Cuff Size: Large Adult)   Pulse 93   Ht 5' 7.5\" (1.715 m)   Wt 243 lb (110.2 kg)   LMP 08/01/2012   SpO2 98%   BMI 37.50 kg/m²     Constitutional: Patient is oriented to person, place, and time. Patient appears well-developed and well-nourished. HENT:               Head: Normocephalic and atraumatic. Eyes: Conjunctivae and EOM are normal.               Neck: Normal range of motion. Thyroid is absent. Scar present. Cardiovascular: Normal rate, regular rhythm and normal heart sounds. Pulmonary/Chest: Effort normal and breath sounds normal.   Abdominal: Soft. Bowel sounds are normal.   Musculoskeletal: Normal range of motion. Neurological: Patient is alert and oriented to person, place, and time. Patient has slow - normal reflexes. Skin: Skin is warm and dry. Psychiatric: Patient has a normal mood and affect.  Patient behavior is normal.       Lab Review:    Orders Only on 09/28/2021   Component Date Value Ref Range Status    Hemoglobin A1C 09/28/2021 12.0  See comment % Final    eAG 09/28/2021 297.7  mg/dL Final    T4 Free 09/28/2021 0.9  0.9 - 1.8 ng/dL Final    TSH 09/28/2021 40.52* 0.27 - 4.20 uIU/mL Final   Office Visit on 06/30/2021   Component Date Value Ref Range Status    Diabetic Retinopathy 06/25/2021 Positive   Final   Orders Only on 06/24/2021   Component Date Value Ref Range Status    Cholesterol, Fasting 06/24/2021 167  0 - 199 mg/dL Final    Triglyceride, Fasting 06/24/2021 119  0 - 150 mg/dL Final    HDL 06/24/2021 48  40 - 60 mg/dL Final    LDL Calculated 06/24/2021 95  <100 mg/dL Final    VLDL Cholesterol Calculated 06/24/2021 24  Not Established mg/dL Final    Sodium 06/24/2021 140  136 - 145 mmol/L Final    Potassium 06/24/2021 4.5  3.5 - 5.1 mmol/L Final    Chloride 06/24/2021 97* 99 - 110 mmol/L Final    CO2 06/24/2021 26  21 - 32 mmol/L Final    Anion Gap 06/24/2021 17* 3 - 16 Final    Glucose 06/24/2021 349* 70 - 99 mg/dL Final    BUN 06/24/2021 14  7 - 20 mg/dL Final    CREATININE 06/24/2021 0.7  0.6 - 1.2 mg/dL Final    GFR Non- 06/24/2021 >60  >60 Final    GFR  06/24/2021 >60  >60 Final    Calcium 06/24/2021 9.7  8.3 - 10.6 mg/dL Final    Total Protein 06/24/2021 6.8  6.4 - 8.2 g/dL Final    Albumin 06/24/2021 4.2  3.4 - 5.0 g/dL Final    Albumin/Globulin Ratio 06/24/2021 1.6  1.1 - 2.2 Final    Total Bilirubin 06/24/2021 0.4  0.0 - 1.0 mg/dL Final    Alkaline Phosphatase 06/24/2021 62  40 - 129 U/L Final    ALT 06/24/2021 12  10 - 40 U/L Final    AST 06/24/2021 13* 15 - 37 U/L Final    Globulin 06/24/2021 2.6  g/dL Final    Hemoglobin A1C 06/24/2021 10.8  See comment % Final    eAG 06/24/2021 263.3  mg/dL Final    T4 Free 06/24/2021 1.4  0.9 - 1.8 ng/dL Final    TSH 06/24/2021 37.64* 0.27 - 4.20 uIU/mL Final           Assessment and Plan     Nigel Andersen was seen today for diabetes. Diagnoses and all orders for this visit:    Dyslipidemia associated with type 2 diabetes mellitus ([address]    Uncontrolled type 2 diabetes mellitus with hyperglycemia (HCC)  -     insulin detemir (LEVEMIR) 100 UNIT/ML injection pen; Inject 25 Units into the skin every morning  -     insulin aspart (NOVOLOG FLEXPEN) 100 UNIT/ML injection pen; 15 units three times a day with meals          1: Type 2 DM complicated with hyperglycemia   Uncontrolled A1C 12% < 10.8%   A1C of <8 would be acceptable because of microvascular and macrovascular complications      Need to bring in meter     Continue Metformin Er 500mg, two tabs twice a day      Change Levemir to 25 units daily at night. Fixed dose.    Change Novolog to 15 units with each meal     Check sugar before each meal and bedtime     Sending sensor as she is on multiple insulin shots and uncontrolled diabetes      All instructions provided in written. Check Blood sugars 2-3 times per day. Log them along with insulin and send them every 2 weeks. Call for blood sugars less than 60 or more than 400. Eye exam: Last exam in Sep 2021. Mild NPDR. She has glaucoma. She has central retinal vein occlusion. Foot exam:  June 2021   Deformity/amputation: absent  Skin lesions/pre-ulcerative calluses: absent  Edema: right- negative, left- negative  Sensory exam: Monofilament sensation: normal  Pulses: normal, Vibration (128 Hz): Intact    Renal screen: check     Set up with CDE , referred on FOV      2: HTN   Controlled with out medication     3: Hyperlipidemia   LDL: 95, HDL: 48, TGs: 119 - June 2021  (before rosuvastatin 10 mg )   C/w Rosuvastatin 10 mg daily       4: Postsurgical hypothyroidism     TSH: 40.52, FT4 0.9 - Sep 2021      Continue Levothyroxine 175 mcg daily in am   Work on adherence     A1C, TFTs, lipids done today, results pending     RTC in 3 months     EDUCATION:   Greater than 50% of this visit was spent in general counseling regarding obesity, diet, exercise, importance of adherence to insulin regime, recognition and treatment of hypo and hyperglycemia,  glucose logging, proper diabetes management, diabetic complications with poor management and the importance of glycemic control in order to avoid the complications of diabetes. Risks and potential complications of diabetes were reviewed with the patient. Diabetes health maintenance plan and follow-up were discussed and understood by the patient. We reviewed the importance of medication compliance and regular follow-up. Aggressive lifestyle modification was encouraged. Exercise Counselling: This patient is a candidate for regular physical exercise.  Instructions to perform the following types of exercise:  Swimming or water aerobic exercise  Brisk walking  Playing tennis  Stationary bicycle or elliptical indoor  Low impact aerobic exercise    Instructions given to exercise for the following duration:  30 minutes a day for five-seven days per week.     Following instructions for being active throughout the day in addition to formal exercise:  Walk instead of drive whenever possible  Take the stairs instead of the elevator  Work in the garden  Park to the far end of the parking lot to add more walking steps to destination      Electronically signed by Felicia Montoya MD on 3/31/2022 at 2:56 PM

## 2022-04-01 LAB
ESTIMATED AVERAGE GLUCOSE: 246 MG/DL
HBA1C MFR BLD: 10.2 %

## 2022-04-10 DIAGNOSIS — E11.65 UNCONTROLLED TYPE 2 DIABETES MELLITUS WITH HYPERGLYCEMIA (HCC): ICD-10-CM

## 2022-04-11 RX ORDER — LISINOPRIL 2.5 MG/1
2.5 TABLET ORAL DAILY
Qty: 30 TABLET | Refills: 3 | Status: SHIPPED | OUTPATIENT
Start: 2022-04-11

## 2022-04-28 ENCOUNTER — TELEPHONE (OUTPATIENT)
Dept: ENDOCRINOLOGY | Age: 67
End: 2022-04-28

## 2022-04-28 RX ORDER — BLOOD-GLUCOSE METER
EACH MISCELLANEOUS
Qty: 1 KIT | Refills: 1 | Status: SHIPPED | OUTPATIENT
Start: 2022-04-28 | End: 2022-05-02 | Stop reason: SDUPTHER

## 2022-04-28 NOTE — TELEPHONE ENCOUNTER
Patient is in need of a new glucometer. Prefers Onetouch. Ultra and has supplies. Goes to [facility].

## 2022-05-02 ENCOUNTER — PATIENT MESSAGE (OUTPATIENT)
Dept: ENDOCRINOLOGY | Age: 67
End: 2022-05-02

## 2022-05-02 DIAGNOSIS — E11.65 UNCONTROLLED TYPE 2 DIABETES MELLITUS WITH HYPERGLYCEMIA (HCC): Primary | ICD-10-CM

## 2022-05-02 RX ORDER — BLOOD-GLUCOSE METER
EACH MISCELLANEOUS
Qty: 1 KIT | Refills: 1 | Status: SHIPPED | OUTPATIENT
Start: 2022-05-02

## 2022-05-02 RX ORDER — BLOOD SUGAR DIAGNOSTIC
STRIP MISCELLANEOUS
Qty: 400 EACH | Refills: 0 | Status: SHIPPED | OUTPATIENT
Start: 2022-05-02 | End: 2022-06-15 | Stop reason: SDUPTHER

## 2022-05-02 NOTE — TELEPHONE ENCOUNTER
From: Vanesa Worthington  To: Dr. Noble Juan: 5/2/2022 2:17 PM EDT  Subject: test stripes for new machine,     Tatiana Mann Doctor, I am requesting an RX for my new blood meter the brand is [employer]     REQUESTING 90 DAYS OF TEST STRIPS BE SENT TO [facility].      Marleni Mckeon,

## 2022-05-03 ENCOUNTER — TELEPHONE (OUTPATIENT)
Dept: ENDOCRINOLOGY | Age: 67
End: 2022-05-03

## 2022-05-03 ENCOUNTER — HOSPITAL ENCOUNTER (OUTPATIENT)
Dept: WOMENS IMAGING | Age: 67
Discharge: HOME | End: 2022-05-03
Payer: MEDICARE

## 2022-05-03 DIAGNOSIS — Z12.31 VISIT FOR SCREENING MAMMOGRAM: ICD-10-CM

## 2022-05-03 PROCEDURE — 77063 BREAST TOMOSYNTHESIS BI: CPT

## 2022-05-03 NOTE — TELEPHONE ENCOUNTER
[address] [place] [address] informed testing BS 3 times per day is fine.
Pharmacy reports patient's plan limit for diabetic supplies is 3 times daily. Prescription for 4 times daily will need a PA. Initiate PA or change testing frequency?
Three times per day is fine
pain

## 2022-05-03 NOTE — TELEPHONE ENCOUNTER
Submitted PA for [employer] strips Key: [id number]   Via CMM STATUS: Approved : 05/03/2022 TO DATE: 05/03/2023

## 2022-06-13 NOTE — TELEPHONE ENCOUNTER
----- Message from Kristina Gould sent at 10/21/2020  4:35 PM EDT -----  Subject: Message to Provider    QUESTIONS  Information for Provider? pt would like if the  could give her a call   back and advise her . she was triaged today a   but there's a note on chart saying she is dismissed . pt would like to   speak with the  if possible . please and thank you .   ---------------------------------------------------------------------------  --------------  CALL BACK INFO  What is the best way for the office to contact you? OK to leave message on   voicemail  Preferred Call Back Phone Number? [phone]  ---------------------------------------------------------------------------  --------------  SCRIPT ANSWERS  Relationship to Patient?  Self 3

## 2022-06-15 DIAGNOSIS — E11.65 UNCONTROLLED TYPE 2 DIABETES MELLITUS WITH HYPERGLYCEMIA (HCC): ICD-10-CM

## 2022-06-15 RX ORDER — BLOOD SUGAR DIAGNOSTIC
STRIP MISCELLANEOUS
Qty: 400 STRIP | Refills: 0 | OUTPATIENT
Start: 2022-06-15

## 2022-06-30 ENCOUNTER — TELEPHONE (OUTPATIENT)
Dept: ENDOCRINOLOGY | Age: 67
End: 2022-06-30

## 2022-06-30 NOTE — TELEPHONE ENCOUNTER
Patient's daughter called today about her missed appointment with Dr Fortino Soto. She states that her mother has vision problems and read the time of the appointment incorrectly. This is the patient's third no show and she is asking for an exception to the no show/dimissal policy. Please advise if patient can be rescheduled or if she needs to establish with a new provider.

## 2022-12-28 ENCOUNTER — OFFICE VISIT (OUTPATIENT)
Dept: PULMONOLOGY | Age: 67
End: 2022-12-28
Payer: MEDICARE

## 2022-12-28 VITALS
HEIGHT: 68 IN | WEIGHT: 288.8 LBS | BODY MASS INDEX: 43.77 KG/M2 | OXYGEN SATURATION: 100 % | HEART RATE: 104 BPM | SYSTOLIC BLOOD PRESSURE: 120 MMHG | DIASTOLIC BLOOD PRESSURE: 62 MMHG

## 2022-12-28 DIAGNOSIS — G47.33 OSA (OBSTRUCTIVE SLEEP APNEA): Primary | ICD-10-CM

## 2022-12-28 DIAGNOSIS — I10 PRIMARY HYPERTENSION: Chronic | ICD-10-CM

## 2022-12-28 DIAGNOSIS — E11.65 UNCONTROLLED TYPE 2 DIABETES MELLITUS WITH HYPERGLYCEMIA (HCC): Chronic | ICD-10-CM

## 2022-12-28 DIAGNOSIS — E66.01 MORBID OBESITY (HCC): Chronic | ICD-10-CM

## 2022-12-28 DIAGNOSIS — F41.1 GENERALIZED ANXIETY DISORDER: Chronic | ICD-10-CM

## 2022-12-28 PROCEDURE — 1123F ACP DISCUSS/DSCN MKR DOCD: CPT | Performed by: INTERNAL MEDICINE

## 2022-12-28 PROCEDURE — 3046F HEMOGLOBIN A1C LEVEL >9.0%: CPT | Performed by: INTERNAL MEDICINE

## 2022-12-28 PROCEDURE — 99204 OFFICE O/P NEW MOD 45 MIN: CPT | Performed by: INTERNAL MEDICINE

## 2022-12-28 PROCEDURE — 3074F SYST BP LT 130 MM HG: CPT | Performed by: INTERNAL MEDICINE

## 2022-12-28 PROCEDURE — 3078F DIAST BP <80 MM HG: CPT | Performed by: INTERNAL MEDICINE

## 2022-12-28 ASSESSMENT — SLEEP AND FATIGUE QUESTIONNAIRES
HOW LIKELY ARE YOU TO NOD OFF OR FALL ASLEEP WHILE SITTING QUIETLY AFTER LUNCH WITHOUT ALCOHOL: 2
HOW LIKELY ARE YOU TO NOD OFF OR FALL ASLEEP WHILE SITTING INACTIVE IN A PUBLIC PLACE: 1
HOW LIKELY ARE YOU TO NOD OFF OR FALL ASLEEP WHILE LYING DOWN TO REST IN THE AFTERNOON WHEN CIRCUMSTANCES PERMIT: 2
ESS TOTAL SCORE: 9
HOW LIKELY ARE YOU TO NOD OFF OR FALL ASLEEP WHEN YOU ARE A PASSENGER IN A CAR FOR AN HOUR WITHOUT A BREAK: 2
NECK CIRCUMFERENCE (INCHES): 18.25
HOW LIKELY ARE YOU TO NOD OFF OR FALL ASLEEP WHILE SITTING AND TALKING TO SOMEONE: 0
HOW LIKELY ARE YOU TO NOD OFF OR FALL ASLEEP WHILE SITTING AND READING: 1
HOW LIKELY ARE YOU TO NOD OFF OR FALL ASLEEP WHILE WATCHING TV: 1
HOW LIKELY ARE YOU TO NOD OFF OR FALL ASLEEP IN A CAR, WHILE STOPPED FOR A FEW MINUTES IN TRAFFIC: 0

## 2022-12-28 NOTE — PROGRESS NOTES
[facility]  [id number] [place] [address], [address] [facility]- [phone]   [facility] Shlomo Farah. [address] [place] [address]. Oksana Wallace 37 [phone]     [address] [facility]  [place] [address]  Dept: [phone]  Loc: [phone]    Assessment:      Visit Diagnoses and Associated Orders       GEOFF (obstructive sleep apnea)   (New Problem)  -  Primary    Old records reviwed, on Tx    Sleep Study with PAP Titration [80326 Custom]   - Future Order         Primary hypertension   (Stable)           Uncontrolled type 2 diabetes mellitus with hyperglycemia ([address]   (Not Stable)           Generalized anxiety disorder   (Stable)           Morbid obesity ([address]   (Not Stable)                    Plan:      Reviewed compliance download with pt. Supplies and parts as needed for her machine. These are medically necessary. Continue medications per her PCP and other physicians. Limit caffeine use after 3pm.  Encouraged her to work on weight loss through diet and exercise. The primary encounter diagnosis was GEOFF (obstructive sleep apnea). Diagnoses of Primary hypertension, Uncontrolled type 2 diabetes mellitus with hyperglycemia ([address] Generalized anxiety disorder, and Morbid obesity ([address] were also pertinent to this visit. The chronic medical conditions listed are directly related to the primary diagnosis listed above. The management of the primary diagnosis affects the secondary diagnosis and vice versa. At this point I placed the patient's machine in auto mode and since it is over 11years old she and its cutting off on her in the melanite is medically necessary for her to get a new machine. We will do a repeat bilevel titration assess the current settings she needs and set her new machine up based on that.     Once we can have her sleep apnea fully controlled we can reassess her insomnia and work with her psychiatrist    [address] [place] [address] Rd from the patient's machine was downloaded and analyzed today. PDMP report was reviewed today. This information was analyzed to assess complexity and medical decision making in regards to further testing and management. Continue meds for: hypertension, diabetes mellitus, and BRENDAN. Pt would medically benefit from wt loss for GEOFF (diet, exercise, surgical). Orders Placed This Encounter   Procedures    Sleep Study with PAP Titration          Subjective:     Patient ID: Yannick Matamoros is a 74 y.o. female. Chief Complaint   Patient presents with    Sleep Apnea       HPI:      Yannick Matamoros is a 74 y.o. female self-referred for a sleep evaluation. She complains of: Diagnosed with sleep apnea sleep management [place]. She has been on a CPAP machine for several years and at some point failed CPAP when changed to bilevel. She initially did well with her machine but over the last few years she feels she has not slept well, the machine cuts off intermittently and wakes her up choking and her machine is now showing a message that it has exceeded its usable lifespan. She is struggling with initiating sleep and is working with psychiatrist on several medications but find that they do not work consistently. Machine Modem/Download Info:  Compliance (hours/night): 8 hrs/night  % of nights >= 4 hrs: 82 %  Download AHI (/hour): 11.9 /HR BILEVEL - Settings  IPAP: 12 cmH2O  EPAP: 8 cmH2O                       DOT/CDL - no  FAA/'s license -no    Previous Report(s) Reviewed: historical medical records, office notes, andreferral letter(s). Pertinent data has been documented. [place] - [place] Sleepiness Score: 9    Social History     Socioeconomic History    Marital status:       Spouse name: Not on file    Number of children: Not on file    Years of education: Not on file    Highest education level: Not on file   Occupational History    Not on file   Tobacco Use    Smoking status: Never    Smokeless tobacco: Never   Vaping Use    Vaping Use: Never used   Substance and Sexual Activity    Alcohol use: Not Currently    Drug use: No    Sexual activity: Not on file   Other Topics Concern    Not on file   Social History Narrative    Not on file     Social Determinants of Health     Financial Resource Strain: Not on file   Food Insecurity: Not on file   Transportation Needs: Not on file   Physical Activity: Not on file   Stress: Not on file   Social Connections: Not on file   Intimate Partner Violence: Not on file   Housing Stability: Not on file        Current Outpatient Medications   Medication Instructions    aspirin 81 mg, Oral    Blood Glucose Monitoring Suppl (ONE TOUCH ULTRA 2) w/Device KIT Use daily blood sugar management    Blood Glucose Monitoring Suppl (ONETOUCH VERIO REFLECT) w/Device KIT Does not apply    Continuous Blood Gluc  (FREESTYLE ERYN 2 READER) SRUTHI Use for personal CGMS. On Multiple insulin shots, checks blood sugars 4 times per day and requires frequent insulin adjustment. Continuous Blood Gluc Sensor (FREESTYLE ERYN 2 SENSOR) MISC Replace every 2 weeks    insulin aspart (NOVOLOG FLEXPEN) 100 UNIT/ML injection pen 15 units three times a day with meals    insulin detemir (LEVEMIR) 25 Units, SubCUTAneous, EVERY MORNING    Insulin Pen Needle 32G X 4 MM MISC 1 each, Does not apply, DAILY    Insulin Syringe-Needle U-100 (INSULIN SYRINGE .3CC/31GX5/16\") 31G X 5/16\" 0.3 ML MISC 1 each, Does not apply, DAILY    ipratropium (ATROVENT) 0.06 % nasal spray 1 spray, Nasal, 3 TIMES DAILY    Lancets MISC 1 each, Does not apply, DAILY, Check blood sugar 2-3 times per day    lisinopril (PRINIVIL;ZESTRIL) 2.5 mg, Oral, DAILY    metFORMIN (GLUCOPHAGE) 500 MG tablet TAKE ONE TABLET BY MOUTH FOUR TIMES DAILY WITH MEALS AND nightly    ONETOUCH ULTRA strip Test 4 times daily.     QUEtiapine (SEROQUEL) 25 mg, Oral, NIGHTLY    rosuvastatin (CRESTOR) 10 mg, Oral, DAILY    sertraline (ZOLOFT) 50 MG tablet TAKE THREE TABLETS BY MOUTH DAILY    Synthroid 175 mcg, Oral, DAILY    tolterodine (DETROL LA) 4 MG extended release capsule TAKE ONE CAPSULE BY MOUTH ONCE DAILY          Objective:     Vitals:  Weight BMI   Wt Readings from Last 3 Encounters:   12/28/22 288 lb 12.8 oz (131 kg)   03/31/22 243 lb (110.2 kg)   09/23/21 231 lb 12.8 oz (105.1 kg)    Body mass index is 44.56 kg/m².      BP HR SaO2   BP Readings from Last 3 Encounters:   12/28/22 120/62   03/31/22 125/74   10/25/21 115/67    Pulse Readings from Last 3 Encounters:   12/28/22 (!) 104   03/31/22 93   10/25/21 111    SpO2 Readings from Last 3 Encounters:   12/28/22 100%   03/31/22 98%   10/25/21 100%          Electronically signed by Sharri House MD on12/28/2022 at 3:27 PM

## 2022-12-30 ENCOUNTER — TELEPHONE (OUTPATIENT)
Dept: SLEEP CENTER | Age: 67
End: 2022-12-30

## 2022-12-30 NOTE — TELEPHONE ENCOUNTER
Tried calling to schedule a bipap sleep study per Tatum Nickerson both numbers -both stating 'subscriber not in service'   Couldn't leave any msg. Will try and email maybe.      [employer]

## 2023-01-18 ENCOUNTER — TELEPHONE (OUTPATIENT)
Dept: PULMONOLOGY | Age: 68
End: 2023-01-18

## 2023-01-30 ENCOUNTER — HOSPITAL ENCOUNTER (OUTPATIENT)
Dept: SLEEP CENTER | Age: 68
Discharge: HOME | End: 2023-01-30
Payer: MEDICARE

## 2023-01-30 DIAGNOSIS — G47.33 OSA (OBSTRUCTIVE SLEEP APNEA): ICD-10-CM

## 2023-01-30 PROCEDURE — 95811 POLYSOM 6/>YRS CPAP 4/> PARM: CPT

## 2023-02-03 ENCOUNTER — TELEPHONE (OUTPATIENT)
Dept: PULMONOLOGY | Age: 68
End: 2023-02-03

## 2023-02-03 NOTE — TELEPHONE ENCOUNTER
LM letting pt know results have not been received a this time and she will be called when available.

## 2023-02-06 ENCOUNTER — TELEPHONE (OUTPATIENT)
Dept: PULMONOLOGY | Age: 68
End: 2023-02-06

## 2023-02-15 ENCOUNTER — TELEPHONE (OUTPATIENT)
Dept: PULMONOLOGY | Age: 68
End: 2023-02-15

## 2023-02-15 NOTE — PROGRESS NOTES
Franny Soto         : 1955 [facility]    Diagnosis: [x] GEOFF (G47.33) [] CSA (G47.31) [] Apnea (G47.30)   Length of Need: [x] 18 Months [] 99 Months [] Other:    Machine (ISABEL!): [] Respironics Dream Station   2   Auto [x] [employer] S11 [] Other:     []  CPAP () [x] Bilevel ()   Mode: [] Auto [] Spontaneous    Mode: [x] Auto [] Spontaneous       IPAP max 25 cmH2O  EPAP min 10 cmH2O  PS 4 cmH2O     Comfort Settings:   - Ramp Pressure: 5 cmH2O                                        - Ramp time: 15 min                                     -  Flex/EPR - 3 full time                                    - For ResMed Bilevel (TiMax-4 sec   TiMin- 0.2 sec)     Humidifier: [x] Heated ()        [x] Water chamber replacement ()/ 1 per 6 months        Mask:   [x] Nasal () /1 per 3 months [] Full Face () /1 per 3 months   [x] Patient choice -Size and fit mask [] Patient Choice - Size and fit mask   [] Dispense:  [] Dispense:    [x] Headgear () / 1 per 3 months [] Headgear () / 1 per 3 months   [x] Replacement Nasal Cushion ()/2 per month [] Interface Replacement ()/1 per month   [] Replacement Nasal Pillows ()/2 per month         Tubing: [x] Heated ()/1 per 3 months    [] Standard ()/1 per 3 months [] Other:           Filters: [x] Non-disposable ()/1 per 6 months     [x] Ultra-Fine, Disposable ()/2 per month        Miscellaneous: [] Chin Strap ()/ 1 per 6 months [] O2 bleed-in:       LPM   [] Oximetry on CPAP/Bilevel []  Other:    [x] Modem: ()         Start Order Date: 02/15/23    MEDICAL JUSTIFICATION:  I, the undersigned, certify that the above prescribed supplies are medically necessary for this patients wellbeing. In my opinion, the supplies are both reasonable and necessary in reference to accepted standards of medicalpractice in treatment of this patients condition.     Dariusz Scales MD      NPI: [id number] Order Signed Date: 02/15/23    Electronically signed by Laura Jamil MD on 2/15/2023 at 4:19 PM    Anmol Zelaya  1955  [facility] [address] [phone] (home) [phone] (work)  [phone] (mobile)      Insurance Info (confirm with patient if correct):  Payer/Plan Subscr  Sex Relation Sub.  Ins. ID Effective Group Num

## 2023-02-15 NOTE — TELEPHONE ENCOUNTER
Spoke with pt and her daughter via speaker phone. Order to be sent to [facility]. F/U to be scheduled.

## 2023-04-20 ENCOUNTER — PATIENT MESSAGE (OUTPATIENT)
Dept: PULMONOLOGY | Age: 68
End: 2023-04-20

## 2023-04-21 NOTE — TELEPHONE ENCOUNTER
From: Matthew Mast  To: Dr. Caro Ng: 4/20/2023 4:22 PM EDT  Subject: trouble with Bi pap     Requesting a f/u with Dr. Ary Conroy, having trouble getting equipment with [employer], is there a more friendly company I can use?  thank you

## 2023-12-04 NOTE — TELEPHONE ENCOUNTER
Eliud Clyde called to request a script for Syringes for her Mother. Needs the smallest size for diabetics. She stated the  is having an Issue getting plastics right now so her Mother has to go back to using a vial for right now and that is why she needs the syringes. Send to [address] [place] [address], 100 quantity. Patient assisted up to the bathroom when brought to Phase 2. Patient oriented to call-light and verbalized understanding. Pt denies any dizziness at this time.

## 2024-08-12 NOTE — PROGRESS NOTES
07/25/20 2047   NIV Type   $NIV $Daily Charge   Skin Assessment Clean, dry, & intact   Skin Protection for O2 Device N/A   Equipment Type respironics   Mode CPAP   Mask Type Nasal mask   Mask Size Medium   Settings/Measurements   CPAP/EPAP 12 cmH2O   Resp 16   FiO2  21 %   Comfort Level Good   Using Accessory Muscles No   SpO2 97   Patient Observation   Observations spo2 97% on cpap 12 Patient: Polo Cardenas    Procedure Summary       Date: 08/12/24 Room / Location: [facility] OR  / [facility]    Anesthesia Start: 0816 Anesthesia Stop: 0902    Procedure: WRIST GANGLION EXCISION, LEFT ELBOW STERIOD INJECTION (Left: Wrist) Diagnosis:       Ganglion cyst      (Ganglion cyst [M67.40])    Surgeons: Roe Hampton MD Provider: Myriam Shepherd MD    Anesthesia Type: general, MAC ASA Status: 2            Anesthesia Type: general, MAC    Vitals  Vitals Value Taken Time   /65 08/12/24 0915   Temp 36.6 °C (97.8 °F) 08/12/24 0900   Pulse 66 08/12/24 0926   Resp 12 08/12/24 0905   SpO2 93 % 08/12/24 0926   Vitals shown include unfiled device data.        Post Anesthesia Care and Evaluation    Patient location during evaluation: bedside  Patient participation: complete - patient participated  Level of consciousness: awake  Pain management: adequate    Airway patency: patent  PONV Status: none  Cardiovascular status: acceptable and stable  Respiratory status: acceptable  Hydration status: acceptable

## 2025-02-20 ENCOUNTER — APPOINTMENT (OUTPATIENT)
Age: 70
End: 2025-02-20
Payer: MEDICARE

## 2025-02-20 ENCOUNTER — HOSPITAL ENCOUNTER (EMERGENCY)
Age: 70
Discharge: HOME | End: 2025-02-21
Attending: EMERGENCY MEDICINE
Payer: MEDICARE

## 2025-02-20 DIAGNOSIS — R19.7 DIARRHEA, UNSPECIFIED TYPE: Primary | ICD-10-CM

## 2025-02-20 DIAGNOSIS — E86.0 DEHYDRATION: ICD-10-CM

## 2025-02-20 LAB
ALBUMIN SERPL-MCNC: 4.2 G/DL (ref 3.4–5)
ALBUMIN/GLOB SERPL: 1.4 {RATIO}
ALP SERPL-CCNC: 81 U/L (ref 40–129)
ALT SERPL-CCNC: 17 U/L (ref 10–40)
ANION GAP SERPL CALCULATED.3IONS-SCNC: 13 MMOL/L (ref 3–16)
AST SERPL-CCNC: 19 U/L (ref 15–37)
BASOPHILS # BLD: 0.04 K/UL (ref 0–0.2)
BASOPHILS NFR BLD: 1 %
BILIRUB SERPL-MCNC: 0.5 MG/DL (ref 0–1)
BILIRUB UR QL STRIP: NEGATIVE
BUN SERPL-MCNC: 12 MG/DL (ref 7–20)
CALCIUM SERPL-MCNC: 9.4 MG/DL (ref 8.3–10.6)
CHARACTER UR: ABNORMAL
CHLORIDE SERPL-SCNC: 97 MMOL/L (ref 99–110)
CLARITY UR: CLEAR
CO2 SERPL-SCNC: 23 MMOL/L (ref 21–32)
COLOR UR: YELLOW
CREAT SERPL-MCNC: 1 MG/DL (ref 0.6–1.2)
EOSINOPHIL # BLD: 0.21 K/UL (ref 0–0.6)
EOSINOPHILS RELATIVE PERCENT: 4 %
EPI CELLS #/AREA URNS HPF: ABNORMAL /HPF
ERYTHROCYTE [DISTWIDTH] IN BLOOD BY AUTOMATED COUNT: 12.3 % (ref 12.4–15.4)
GFR, ESTIMATED: 61 ML/MIN/1.73M2
GLUCOSE SERPL-MCNC: 562 MG/DL (ref 70–99)
GLUCOSE UR STRIP-MCNC: >=1000 MG/DL
HCT VFR BLD AUTO: 42.2 % (ref 36–48)
HGB BLD-MCNC: 15.1 G/DL (ref 12–16)
HGB UR QL STRIP.AUTO: ABNORMAL
IMM GRANULOCYTES # BLD AUTO: 0.01 K/UL (ref 0–0.5)
IMM GRANULOCYTES NFR BLD: 0 %
KETONES UR STRIP-MCNC: NEGATIVE MG/DL
LACTATE BLDV-SCNC: 2.6 MMOL/L (ref 0.4–2)
LEUKOCYTE ESTERASE UR QL STRIP: NEGATIVE
LYMPHOCYTES NFR BLD: 1.8 K/UL (ref 1–5.1)
LYMPHOCYTES RELATIVE PERCENT: 34 %
MCH RBC QN AUTO: 30.6 PG (ref 26–34)
MCHC RBC AUTO-ENTMCNC: 35.8 G/DL (ref 31–36)
MCV RBC AUTO: 85.6 FL (ref 80–100)
MONOCYTES NFR BLD: 0.31 K/UL (ref 0–1.3)
MONOCYTES NFR BLD: 6 %
NEUTROPHILS NFR BLD: 56 %
NEUTS SEG NFR BLD: 2.97 K/UL (ref 1.7–7.7)
NITRITE UR QL STRIP: NEGATIVE
PH UR STRIP: 6 [PH] (ref 5–8)
PLATELET # BLD AUTO: 237 K/UL (ref 135–450)
PMV BLD AUTO: 11.1 FL
POTASSIUM SERPL-SCNC: 4.3 MMOL/L (ref 3.5–5.1)
PROT SERPL-MCNC: 7.2 G/DL (ref 6.4–8.2)
PROT UR STRIP-MCNC: NEGATIVE MG/DL
RBC # BLD AUTO: 4.93 M/UL (ref 4–5.2)
RBC #/AREA URNS HPF: ABNORMAL /HPF
SODIUM SERPL-SCNC: 132 MMOL/L (ref 136–145)
SP GR UR STRIP: <1.005 (ref 1–1.03)
UROBILINOGEN UR STRIP-ACNC: 0.2 EU/DL (ref 0–1)
WBC #/AREA URNS HPF: ABNORMAL /HPF
WBC OTHER # BLD: 5.3 K/UL (ref 4–11)

## 2025-02-20 PROCEDURE — 99285 EMERGENCY DEPT VISIT HI MDM: CPT

## 2025-02-20 PROCEDURE — 74177 CT ABD & PELVIS W/CONTRAST: CPT

## 2025-02-20 PROCEDURE — 6360000002 HC RX W HCPCS: Performed by: EMERGENCY MEDICINE

## 2025-02-20 PROCEDURE — 96361 HYDRATE IV INFUSION ADD-ON: CPT

## 2025-02-20 PROCEDURE — 96374 THER/PROPH/DIAG INJ IV PUSH: CPT

## 2025-02-20 PROCEDURE — 83605 ASSAY OF LACTIC ACID: CPT

## 2025-02-20 PROCEDURE — 2580000003 HC RX 258: Performed by: EMERGENCY MEDICINE

## 2025-02-20 PROCEDURE — 85025 COMPLETE CBC W/AUTO DIFF WBC: CPT

## 2025-02-20 PROCEDURE — 81001 URINALYSIS AUTO W/SCOPE: CPT

## 2025-02-20 PROCEDURE — 6370000000 HC RX 637 (ALT 250 FOR IP): Performed by: EMERGENCY MEDICINE

## 2025-02-20 PROCEDURE — 6360000004 HC RX CONTRAST MEDICATION: Performed by: EMERGENCY MEDICINE

## 2025-02-20 PROCEDURE — 82962 GLUCOSE BLOOD TEST: CPT

## 2025-02-20 PROCEDURE — 80053 COMPREHEN METABOLIC PANEL: CPT

## 2025-02-20 RX ORDER — IOPAMIDOL 755 MG/ML
75 INJECTION, SOLUTION INTRAVASCULAR
Status: COMPLETED | OUTPATIENT
Start: 2025-02-20 | End: 2025-02-20

## 2025-02-20 RX ORDER — MORPHINE SULFATE 4 MG/ML
4 INJECTION, SOLUTION INTRAMUSCULAR; INTRAVENOUS
Status: COMPLETED | OUTPATIENT
Start: 2025-02-20 | End: 2025-02-20

## 2025-02-20 RX ORDER — 0.9 % SODIUM CHLORIDE 0.9 %
1000 INTRAVENOUS SOLUTION INTRAVENOUS ONCE
Status: COMPLETED | OUTPATIENT
Start: 2025-02-20 | End: 2025-02-21

## 2025-02-20 RX ORDER — VANCOMYCIN HYDROCHLORIDE 125 MG/1
125 CAPSULE ORAL ONCE
Status: COMPLETED | OUTPATIENT
Start: 2025-02-20 | End: 2025-02-20

## 2025-02-20 RX ADMIN — SODIUM CHLORIDE 1000 ML: 0.9 INJECTION, SOLUTION INTRAVENOUS at 23:38

## 2025-02-20 RX ADMIN — MORPHINE SULFATE 4 MG: 4 INJECTION, SOLUTION INTRAMUSCULAR; INTRAVENOUS at 22:55

## 2025-02-20 RX ADMIN — VANCOMYCIN HYDROCHLORIDE 125 MG: 125 CAPSULE ORAL at 23:38

## 2025-02-20 RX ADMIN — IOPAMIDOL 75 ML: 755 INJECTION, SOLUTION INTRAVENOUS at 22:38

## 2025-02-20 RX ADMIN — SODIUM CHLORIDE 1000 ML: 0.9 INJECTION, SOLUTION INTRAVENOUS at 22:52

## 2025-02-20 ASSESSMENT — PAIN DESCRIPTION - DESCRIPTORS: DESCRIPTORS: ACHING

## 2025-02-20 ASSESSMENT — LIFESTYLE VARIABLES
HOW OFTEN DO YOU HAVE A DRINK CONTAINING ALCOHOL: NEVER
HOW MANY STANDARD DRINKS CONTAINING ALCOHOL DO YOU HAVE ON A TYPICAL DAY: PATIENT DOES NOT DRINK

## 2025-02-20 ASSESSMENT — PAIN SCALES - GENERAL: PAINLEVEL_OUTOF10: 7

## 2025-02-20 ASSESSMENT — PAIN DESCRIPTION - ORIENTATION: ORIENTATION: LOWER

## 2025-02-20 ASSESSMENT — PAIN - FUNCTIONAL ASSESSMENT: PAIN_FUNCTIONAL_ASSESSMENT: 0-10

## 2025-02-20 ASSESSMENT — PAIN DESCRIPTION - LOCATION: LOCATION: ABDOMEN

## 2025-02-21 VITALS
BODY MASS INDEX: 37.28 KG/M2 | OXYGEN SATURATION: 99 % | SYSTOLIC BLOOD PRESSURE: 124 MMHG | TEMPERATURE: 97.1 F | HEART RATE: 82 BPM | RESPIRATION RATE: 16 BRPM | HEIGHT: 67 IN | DIASTOLIC BLOOD PRESSURE: 78 MMHG | WEIGHT: 237.5 LBS

## 2025-02-21 LAB — GLUCOSE BLD-MCNC: 517 MG/DL (ref 70–99)

## 2025-02-21 PROCEDURE — 96361 HYDRATE IV INFUSION ADD-ON: CPT

## 2025-02-21 RX ORDER — ONDANSETRON 4 MG/1
4 TABLET, ORALLY DISINTEGRATING ORAL 3 TIMES DAILY PRN
Qty: 21 TABLET | Refills: 0 | Status: SHIPPED | OUTPATIENT
Start: 2025-02-21

## 2025-02-21 RX ORDER — VANCOMYCIN HYDROCHLORIDE 125 MG/1
125 CAPSULE ORAL 4 TIMES DAILY
Qty: 40 CAPSULE | Refills: 0 | Status: SHIPPED | OUTPATIENT
Start: 2025-02-21 | End: 2025-03-03

## 2025-02-21 ASSESSMENT — PAIN DESCRIPTION - LOCATION: LOCATION: ABDOMEN

## 2025-02-21 ASSESSMENT — PAIN SCALES - GENERAL: PAINLEVEL_OUTOF10: 7

## 2025-02-21 NOTE — ED TRIAGE NOTES
Verified patient's name and .    Daughter of patient drove her here.Patient in wheelchair, uses cane/ walker at baseline, but currently has generalized weakness due to frequent diarrhea over the last nine days. Patient is waiting for stool sample results from earlier this week for possible C Diff.    Patient is in restroom at this time.

## 2025-02-21 NOTE — ED NOTES
D/C: Order noted for d/c. Pt confirmed d/c paperwork has correct name. Discharge and education instructions reviewed with patient. Teach-back successful.  Pt verbalized understanding and denied questions at this time. No acute distress noted. Patient instructed to follow-up as noted - return to emergency department if symptoms worsen. Patient verbalized understanding. Discharged per EDMD with discharge instructions. Pt discharged to private vehicle. Patient stable upon departure. Thanked patient for [facility] for care. Provider aware of patient pain at time of discharge.    
Patient requesting to see physician and get an update. Dr. Burgos notified.   
Patient taken to imaging via stretcher per radiology staff.  
Per patient request, she will try to provide urine specimen without being straight cathed. She does notfeel like she needs to void or have a bowel movement at this time.  
I have reviewed and confirmed nurses' notes...

## 2025-02-21 NOTE — ED PROVIDER NOTES
EMERGENCY MEDICINE ATTENDING NOTE  Jessee Burgos Jr., DO, FACEP, FAAEM        CHIEF COMPLAINT  Chief Complaint   Patient presents with    Diarrhea        HISTORY OF PRESENT ILLNESS  Hortencia Cano is a 69 y.o. female who presents to the ED for evaluation of profuse diarrhea and abdominal pain and nausea.  This been going on for about a week.  Patient has had a few episodes of C. difficile over the last year and states it feels very much like that again.  Patient was on recent course of antibiotics which she thinks may have triggered it.  Denies any fevers or chills.  States she goes to the restroom multiple times an hour and it is profuse and she cannot keep up.  Has diffuse abdominal cramping with it.  Feels nauseous but does not vomit.    Nursing/triage notes reviewed.  No other complaints, modifying factors or associated symptoms.     REVIEW OF SYSTEMS:  All systems are reviewed and are negative unless noted in the HPI.    PAST MEDICAL HISTORY  Past Medical History:   Diagnosis Date    ADHD (attention deficit hyperactivity disorder)     Anesthesia     slow to wake when she was on diazepam    Chronic back pain     Deafness in right ear     Depression     GERD (gastroesophageal reflux disease)     Hyperlipidemia     Hypertension     resolved    Hypothyroidism     Meniere disorder     Morbid obesity     Neovascular glaucoma 2019    OAB (overactive bladder)     GEOFF (obstructive sleep apnea) 2011    Panic disorder without agoraphobia     Rash     follow be dermatologist, skin intact    Seizure (HCC)     Toxemia    Sleep apnea     cpap    Type 2 diabetes, uncontrolled, 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   Constitutional:       General: She is not in acute distress.     Appearance: She is obese. She is not ill-appearing or toxic-appearing.   HENT:      Head: Normocephalic and atraumatic.      Mouth/Throat:      Mouth: Mucous membranes are dry.   Cardiovascular:      Rate and Rhythm: Normal rate and regular rhythm.   Pulmonary:      Effort: Pulmonary effort is normal. No respiratory distress.   Abdominal:      Palpations: Abdomen is soft.      Tenderness: There is no abdominal tenderness.      Hernia: No hernia is present.   Skin:     General: Skin is warm and dry.      Findings: No rash.   Neurological:      General: No focal deficit present.      Mental Status: She is alert and oriented to person, place, and time.   Psychiatric:         Mood and Affect: Mood normal.         Behavior: Behavior normal.        BRIEF DIFFERENTIAL DIAGNOSIS  1.  C. difficile  2.  Functional diarrhea  3.  Megacolon  4.  Colitis  5.  Gastroenteritis  6.  Dehydration    INDEPENDENT EKG INTERPRETATION:  None    LABS  I have personally reviewed all labs for this visit.   Results for orders placed or performed during the hospital encounter of 02/20/25   CBC with Auto Differential   Result Value Ref Range    WBC 5.3 4.0 - 11.0 k/uL    RBC 4.93 4.00 - 5.20 m/uL    Hemoglobin 15.1 12.0 - 16.0 g/dL    Hematocrit 42.2 36.0 - 48.0 %    MCV 85.6 80.0 - 100.0 fL    MCH 30.6 26.0 - 34.0 pg    MCHC 35.8 31.0 - 36.0 g/dL    RDW 12.3 (L) 12.4 - 15.4 %    Platelets 237 135 - 450 k/uL    MPV 11.1 fL    Neutrophils % 56 %    Lymphocytes % 34 %    Monocytes % 6 %    Eosinophils % 4 %    Basophils % 1 %    Immature Granulocytes % 0 0 %    Neutrophils Absolute 2.97 1.70 - 7.70 k/uL    Lymphocytes Absolute 1.80

## 2025-05-23 NOTE — PROGRESS NOTES
Patient ID:   Silver Sanderson is a 63 y.o. female    Chief Complaint:   Silver Sanderson presents for an initial evaluation of Type 2 Diabetes Mellitus , Hyperlipidemia and hypertension. 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 It started as gestational diabetes. On insulin since diabetes. Metformin ER 500mg not taking since Aug 2020. Levemir and Humalog - out since Aug 2020      Lost 24 since Sep 2020. She is doing intermittent fasting. Checks blood sugars 0 times per day. Reviewed/Reported  AM:   Lunch:  Supper:   HS:     Hypoglycemias: None     Meals: four, supper is bigger. Snacks, 3 per day (oranges, apples, yogurt). Diet sodas. Exercise: [address] work and yard work . Uses elliptical, 30 minutes every day     Denies chest pain, exertional dyspnea. Family history of CAD:Mother had strokes, father had strokes   Denies smoking. Currently on ASA mg daily     Post surgical hypothyroidism   Surgery for MNG in 2008   Path benign . Report reviewed from [facility]     Taking levothyroxine 175 mcg daily in am and waits for 60 minutes before breakfast or other meds. She ran out 2 days ago     She has psychiatric illness . 2020 was tough for her. The following portions of the patient's history were reviewed and updated as appropriate:       Family History   Problem Relation Age of Onset    Diabetes Mother     Cancer Mother     Obesity Mother     Diabetes Father     Cancer Father     Obesity Father     Other Father         malignant hyperthermia    Heart Disease Brother     Diabetes Brother     Heart Surgery Brother     Obesity Brother     Heart Disease Brother     Heart Surgery Brother     Obesity Brother     Diabetes Sister     Other Other         daughter  with malignant hyperthermia         Social History     Socioeconomic History    Marital status:       Spouse name: Not on file    Number of children: Not on file    Years of education: Not on file    Highest education level: Not on file   Occupational History    Not on file   Tobacco Use    Smoking status: Never Smoker    Smokeless tobacco: Never Used   Substance and Sexual Activity    Alcohol use: Not Currently    Drug use: No    Sexual activity: Not on file   Other Topics Concern    Not on file   Social History Narrative    Not on file     Social Determinants of Health     Financial Resource Strain:     Difficulty of Paying Living Expenses:    Food Insecurity:     Worried About Running Out of Food in the Last Year:     [address] in the Last Year:    Transportation Needs:     Lack of Transportation (Medical):      Lack of Transportation (Non-Medical):    Physical Activity:     Days of Exercise per Week:     Minutes of Exercise per Session:    Stress:     Feeling of Stress :    Social Connections:     Frequency of Communication with Friends and Family:     Frequency of Social Gatherings with Friends and Family:     Attends Spiritism Services:     Active Member of Clubs or Organizations:     Attends Club or Organization Meetings:     Marital Status:    Intimate Partner Violence:     Fear of Current or Ex-Partner:     Emotionally Abused:     Physically Abused:     Sexually Abused:        Past Medical History:   Diagnosis Date    ADHD (attention deficit hyperactivity disorder)     Anesthesia     slow to wake when she was on diazepam    Chronic back pain     Deafness in right ear 1997    Depression     GERD (gastroesophageal reflux disease)     Hyperlipidemia     Hypertension     resolved    Hypothyroidism     Meniere disorder     Morbid obesity ([address]     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    Panic disorder without agoraphobia     Rash     follow be dermatologist, skin intact    Seizure ([facility] [address]     Toxemia    Sleep apnea     cpap    Type 2 diabetes, uncontrolled, 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 (HCC)     left eye    Vertigo        Past Surgical History:   Procedure Laterality Date    ABDOMEN SURGERY  11    LAPAROSCOPIC ADJUSTABLE GASTRIC BANDING    CARDIAC CATHETERIZATION       SECTION      GLAUCOMA SURGERY Left 2019    Ahmed valve    LAP BAND  2010    THYROIDECTOMY      UPPER GASTROINTESTINAL ENDOSCOPY  13         Allergies   Allergen Reactions    Levocetirizine Dihydrochloride Anaphylaxis    Sulfa Antibiotics Hives    Divalproex Sodium Er Diarrhea    Dulaglutide Other (See Comments)     Pancreatitis    Exenatide Other (See Comments)     Pancreatitis    Halphabarol Diarrhea         Current Outpatient Medications:     furosemide (LASIX) 20 MG tablet, Take 20 mg by mouth daily, Disp: , Rfl:     sertraline (ZOLOFT) 100 MG tablet, TAKE ONE TABLET BY MOUTH TWICE DAILY, Disp: , Rfl:     ARIPiprazole (ABILIFY) 5 MG tablet, Take 5 mg by mouth daily, Disp: , Rfl:     DOXYCYCLINE PO, Take 5 mg by mouth, Disp: , Rfl:     NONFORMULARY, BELLSOMNA AT BEDTIME, Disp: , Rfl:     SYNTHROID 175 MCG tablet, Take 1 tablet by mouth Daily, Disp: 30 tablet, Rfl: 11    Blood Glucose Monitoring Suppl (BLOOD GLUCOSE MONITOR SYSTEM) w/Device KIT, Check sugar 3 times per day, Disp: 1 kit, Rfl: 0    blood glucose monitor strips, Check sugar 3 times per day, Disp: 100 strip, Rfl: 11    Lancets MISC, 1 each by Does not apply route daily Check blood sugar 2-3 times per day, Disp: 100 each, Rfl: 11    diazePAM (VALIUM) 5 MG tablet, Take 5 mg by mouth every 6 hours as needed.  , Disp: , Rfl:     haloperidol (HALDOL) 5 MG tablet, Take 1 tablet by mouth 2 times daily, Disp: 60 tablet, Rfl: 0    diclofenac (VOLTAREN) 75 MG EC tablet, Take 1 tablet by mouth 2 times daily, Disp: 60 tablet, Rfl: 3    cyclobenzaprine (FLEXERIL) 5 MG tablet, Take 1 tablet by mouth 3 times daily as needed for Muscle spasms, Disp: 90 tablet, Rfl: 5    aspirin 81 MG tablet, Take 81 mg by mouth, Disp: and breath sounds normal.   Abdominal: Soft. Bowel sounds are normal.   Musculoskeletal: Normal range of motion. Neurological: Patient is alert and oriented to person, place, and time. Patient has slow - normal reflexes. Skin: Skin is warm and dry. Psychiatric: Patient has a normal mood and affect. Patient behavior is normal.     Lab Review:    Orders Only on 07/01/2020   Component Date Value Ref Range Status    Sodium 07/01/2020 136  136 - 145 mmol/L Final    Potassium 07/01/2020 4.4  3.5 - 5.1 mmol/L Final    Chloride 07/01/2020 98* 99 - 110 mmol/L Final    CO2 07/01/2020 26  21 - 32 mmol/L Final    Anion Gap 07/01/2020 12  3 - 16 Final    Glucose 07/01/2020 331* 70 - 99 mg/dL Final    BUN 07/01/2020 12  7 - 20 mg/dL Final    CREATININE 07/01/2020 0.6  0.6 - 1.2 mg/dL Final    GFR Non- 07/01/2020 >60  >60 Final    GFR  07/01/2020 >60  >60 Final    Calcium 07/01/2020 9.0  8.3 - 10.6 mg/dL Final    TSH 07/01/2020 3.63  0.27 - 4.20 uIU/mL Final    Hemoglobin A1C 07/01/2020 10.2  See comment % Final    eAG 07/01/2020 246.0  mg/dL Final           Assessment and Plan     Kellee Hernandez was seen today for consultation, diabetes and thyroid problem. Diagnoses and all orders for this visit:    Uncontrolled type 2 diabetes mellitus with hyperglycemia ([facility] [address]  -     TSH without Reflex; Future  -     T4, Free; Future  -     Hemoglobin A1C; Future  -     Comprehensive Metabolic Panel; Future  -     Lipid, Fasting; Future  -     Microalbumin / Creatinine Urine Ratio; Future  -     Discontinue: Blood Glucose Monitoring Suppl (BLOOD GLUCOSE MONITOR SYSTEM) w/Device KIT; Check sugar 3 times per day  -     Discontinue: Lancets MISC; 1 each by Does not apply route daily Check blood sugar 2-3 times per day  -     Discontinue: blood glucose monitor strips; Check sugar 3 times per day  -     Blood Glucose Monitoring Suppl (BLOOD GLUCOSE MONITOR SYSTEM) w/Device KIT;  Check sugar 3 times per day  -     blood glucose monitor strips; Check sugar 3 times per day  -     Lancets MISC; 1 each by Does not apply route daily Check blood sugar 2-3 times per day    Dyslipidemia associated with type 2 diabetes mellitus (HCC)  -     TSH without Reflex; Future  -     T4, Free; Future  -     Hemoglobin A1C; Future  -     Comprehensive Metabolic Panel; Future  -     Lipid, Fasting; Future  -     Microalbumin / Creatinine Urine Ratio; Future  -     Discontinue: Blood Glucose Monitoring Suppl (BLOOD GLUCOSE MONITOR SYSTEM) w/Device KIT; Check sugar 3 times per day  -     Discontinue: Lancets MISC; 1 each by Does not apply route daily Check blood sugar 2-3 times per day  -     Discontinue: blood glucose monitor strips; Check sugar 3 times per day  -     Blood Glucose Monitoring Suppl (BLOOD GLUCOSE MONITOR SYSTEM) w/Device KIT; Check sugar 3 times per day  -     blood glucose monitor strips; Check sugar 3 times per day  -     Lancets MISC; 1 each by Does not apply route daily Check blood sugar 2-3 times per day    Postoperative hypothyroidism  -     TSH without Reflex; Future  -     T4, Free; Future  -     Discontinue: SYNTHROID 175 MCG tablet; Take 1 tablet by mouth Daily  -     SYNTHROID 175 MCG tablet; Take 1 tablet by mouth Daily    Severe obesity (BMI 35.0-35.9 with comorbidity) ([facility] [address]          1: Type 2 DM complicated with hyperglycemia   Uncontrolled A1C 10.8%   A1C of <8 would be acceptable because of microvascular and macrovascular complications      Check levels     Will consider adding insulin and metformin     No humalog for now     Use Treat to target basal insulin. If pre-breakfast sugar is above 130 on 2 consecutive days increase Lantus/Levemir by 2 units. If pre-breakfast sugar is below 80 on one day decrease Lantus/Levemir by 2 units. All instructions provided in written. Check Blood sugars 3-4 times per day. Log them along with insulin and send them every 2 weeks.  Call for blood sugars less than to the far end of the parking lot to add more walking steps to destination      Electronically signed by Leonid Velarde MD on 6/8/2021 at 10:09 AM (1) body pink, extremities blue